# Patient Record
Sex: MALE | URBAN - METROPOLITAN AREA
[De-identification: names, ages, dates, MRNs, and addresses within clinical notes are randomized per-mention and may not be internally consistent; named-entity substitution may affect disease eponyms.]

---

## 2017-05-31 ENCOUNTER — APPOINTMENT (OUTPATIENT)
Age: 67
Setting detail: DERMATOLOGY
End: 2017-06-22

## 2017-05-31 DIAGNOSIS — L82.1 OTHER SEBORRHEIC KERATOSIS: ICD-10-CM

## 2017-05-31 DIAGNOSIS — D18.0 HEMANGIOMA: ICD-10-CM

## 2017-05-31 DIAGNOSIS — D22 MELANOCYTIC NEVI: ICD-10-CM

## 2017-05-31 DIAGNOSIS — L81.4 OTHER MELANIN HYPERPIGMENTATION: ICD-10-CM

## 2017-05-31 DIAGNOSIS — F10.99 ALCOHOL USE, UNSPECIFIED WITH UNSPECIFIED ALCOHOL-INDUCED DISORDER: ICD-10-CM

## 2017-05-31 DIAGNOSIS — D36.1 BENIGN NEOPLASM OF PERIPHERAL NERVES AND AUTONOMIC NERVOUS SYSTEM: ICD-10-CM

## 2017-05-31 PROBLEM — D36.13 BENIGN NEOPLASM OF PERIPHERAL NERVES AND AUTONOMIC NERVOUS SYSTEM OF LOWER LIMB, INCLUDING HIP: Status: ACTIVE | Noted: 2017-05-31

## 2017-05-31 PROBLEM — D18.01 HEMANGIOMA OF SKIN AND SUBCUTANEOUS TISSUE: Status: ACTIVE | Noted: 2017-05-31

## 2017-05-31 PROBLEM — D22.5 MELANOCYTIC NEVI OF TRUNK: Status: ACTIVE | Noted: 2017-05-31

## 2017-05-31 PROCEDURE — OTHER COUNSELING: OTHER

## 2017-05-31 PROCEDURE — OTHER RECOMMENDATIONS: OTHER

## 2017-05-31 PROCEDURE — 99214 OFFICE O/P EST MOD 30 MIN: CPT

## 2017-05-31 PROCEDURE — OTHER MIPS QUALITY: OTHER

## 2017-05-31 ASSESSMENT — LOCATION DETAILED DESCRIPTION DERM
LOCATION DETAILED: LEFT SUPERIOR MEDIAL MIDBACK
LOCATION DETAILED: RIGHT MEDIAL UPPER BACK
LOCATION DETAILED: LEFT DORSAL 2ND TOE
LOCATION DETAILED: XIPHOID
LOCATION DETAILED: LEFT MID-UPPER BACK
LOCATION DETAILED: PERIUMBILICAL SKIN
LOCATION DETAILED: RIGHT MEDIAL SUPERIOR CHEST

## 2017-05-31 ASSESSMENT — LOCATION SIMPLE DESCRIPTION DERM
LOCATION SIMPLE: LEFT LOWER BACK
LOCATION SIMPLE: LEFT UPPER BACK
LOCATION SIMPLE: RIGHT UPPER BACK
LOCATION SIMPLE: ABDOMEN
LOCATION SIMPLE: CHEST
LOCATION SIMPLE: LEFT 2ND TOE

## 2017-05-31 ASSESSMENT — LOCATION ZONE DERM
LOCATION ZONE: TRUNK
LOCATION ZONE: TOE

## 2017-05-31 NOTE — PROCEDURE: MIPS QUALITY
Detail Level: Detailed
Quality 226: Preventive Care And Screening: Tobacco Use: Screening And Cessation Intervention: Patient screened for tobacco and is an ex-smoker
Quality 130: Documentation Of Current Medications In The Medical Record: Current Medications Documented
Quality 431: Preventive Care And Screening: Unhealthy Alcohol Use - Screening: Patient screened for unhealthy alcohol use using a single question and scores 2 or greater episodes per year and brief intervention occurred
Quality 110: Preventive Care And Screening: Influenza Immunization: Influenza Immunization previously received during influenza season

## 2017-05-31 NOTE — HPI: SKIN CHECK
What Is The Reason For Today's Visit?: Excessive sun exposure
Additional History: Patient is here for annual skin exam (was seen 2 years ago).  Patient denies any new or worrisome lesions or spots.  Patient lives in [place] half of the year. Enjoys many outdoor activities (golf and swimming).  States he wears sunscreen most of the time but does not wear a hat.

## 2017-05-31 NOTE — PROCEDURE: RECOMMENDATIONS
Recommendation Preamble: The following recommendations were made during the visit: patient states that he consumes 4-5 beers or wine when he plays golf or goes out to dinner with friends.  This averages about twice to three items a month.  Patient was unaware  of 4-5 drinks per sitting is excessive.  Discussed in details ways to achieve change in behavior, alternating a drink then a glass of water, not drinking or just reducing the number of drinks. Patient genuinely concerned and interested in making the change.
Detail Level: Zone

## 2017-08-01 ENCOUNTER — SURGERY (OUTPATIENT)
Age: 67
End: 2017-08-01

## 2017-08-01 ENCOUNTER — HOSPITAL ENCOUNTER (OUTPATIENT)
Facility: CLINIC | Age: 67
Discharge: HOME | End: 2017-08-01
Attending: COLON & RECTAL SURGERY | Admitting: COLON & RECTAL SURGERY
Payer: MEDICARE

## 2017-08-01 VITALS
RESPIRATION RATE: 28 BRPM | SYSTOLIC BLOOD PRESSURE: 158 MMHG | OXYGEN SATURATION: 94 % | HEIGHT: 71 IN | WEIGHT: 210 LBS | BODY MASS INDEX: 29.4 KG/M2 | DIASTOLIC BLOOD PRESSURE: 99 MMHG

## 2017-08-01 LAB — COLONOSCOPY: NORMAL

## 2017-08-01 PROCEDURE — 25000128 H RX IP 250 OP 636: Performed by: COLON & RECTAL SURGERY

## 2017-08-01 PROCEDURE — G0121 COLON CA SCRN NOT HI RSK IND: HCPCS | Performed by: COLON & RECTAL SURGERY

## 2017-08-01 PROCEDURE — G0500 MOD SEDAT ENDO SERVICE >5YRS: HCPCS | Performed by: COLON & RECTAL SURGERY

## 2017-08-01 PROCEDURE — 45378 DIAGNOSTIC COLONOSCOPY: CPT | Performed by: COLON & RECTAL SURGERY

## 2017-08-01 RX ORDER — ONDANSETRON 2 MG/ML
4 INJECTION INTRAMUSCULAR; INTRAVENOUS EVERY 6 HOURS PRN
Status: DISCONTINUED | OUTPATIENT
Start: 2017-08-01 | End: 2017-08-01 | Stop reason: HOSPADM

## 2017-08-01 RX ORDER — ONDANSETRON 2 MG/ML
4 INJECTION INTRAMUSCULAR; INTRAVENOUS
Status: DISCONTINUED | OUTPATIENT
Start: 2017-08-01 | End: 2017-08-01 | Stop reason: HOSPADM

## 2017-08-01 RX ORDER — NALOXONE HYDROCHLORIDE 0.4 MG/ML
.1-.4 INJECTION, SOLUTION INTRAMUSCULAR; INTRAVENOUS; SUBCUTANEOUS
Status: DISCONTINUED | OUTPATIENT
Start: 2017-08-01 | End: 2017-08-01 | Stop reason: HOSPADM

## 2017-08-01 RX ORDER — FLUMAZENIL 0.1 MG/ML
0.2 INJECTION, SOLUTION INTRAVENOUS
Status: DISCONTINUED | OUTPATIENT
Start: 2017-08-01 | End: 2017-08-01 | Stop reason: HOSPADM

## 2017-08-01 RX ORDER — ONDANSETRON 4 MG/1
4 TABLET, ORALLY DISINTEGRATING ORAL EVERY 6 HOURS PRN
Status: DISCONTINUED | OUTPATIENT
Start: 2017-08-01 | End: 2017-08-01 | Stop reason: HOSPADM

## 2017-08-01 RX ORDER — LIDOCAINE 40 MG/G
CREAM TOPICAL
Status: DISCONTINUED | OUTPATIENT
Start: 2017-08-01 | End: 2017-08-01 | Stop reason: HOSPADM

## 2017-08-01 RX ORDER — FENTANYL CITRATE 50 UG/ML
INJECTION, SOLUTION INTRAMUSCULAR; INTRAVENOUS PRN
Status: DISCONTINUED | OUTPATIENT
Start: 2017-08-01 | End: 2017-08-01 | Stop reason: HOSPADM

## 2017-08-01 RX ADMIN — FENTANYL CITRATE 100 MCG: 50 INJECTION, SOLUTION INTRAMUSCULAR; INTRAVENOUS at 14:10

## 2017-08-01 RX ADMIN — MIDAZOLAM HYDROCHLORIDE 2 MG: 1 INJECTION, SOLUTION INTRAMUSCULAR; INTRAVENOUS at 14:10

## 2017-08-01 NOTE — BRIEF OP NOTE
[facility] Brief Operative Note    Pre-operative diagnosis: SCREENING   Post-operative diagnosis: sigmoid diverticula, otherwise normal   Procedure: Procedure(s):  COLONOSCOPY - Wound Class: II-Clean Contaminated   Surgeon(s): Surgeon(s) and Role:     * Emil Plaza MD - Primary   Estimated blood loss: * No values recorded between 8/1/2017 12:00 AM and 8/1/2017  2:30 PM *    Specimens: * No specimens in log *   Findings: sigmoid diverticula, otherwise normal  See provation procedure note in chart review.    Emil Plaza MD  [facility]  [phone]

## 2017-08-01 NOTE — H&P
[facility]    History and Physical  Colon and Rectal Surgery     Date of Admission:  8/1/2017      Assessment & Plan   Dominguez Pittman is a 67 year old male who presents for colonoscopy.    Indication: family history of polyps/ screening  Plan for Colonoscopy with possible biopsy, possible polypectomy. We discussed the risks, benefits and alternatives and the patient wished to proceed.    The above has been forwarded to the consulting provider.      Emil Plaza MD  [facility]  [phone]        Code Status   Full Code    Primary Care Physician   Jad Truong      History is obtained from the patient    History of Present Illness   Dominguez Pittman is a 67 year old male who presents with family history of polyps/ screening    Past Medical History    I have reviewed this patient's medical history and updated it with pertinent information if needed.   Past Medical History:   Diagnosis Date     Abnormal blood find NEC      Arthritis      High cholesterol      Hx musculoskletl dis NEC      Hypertension      Personal history of other disorders of nervous system and sense organs      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        Past Surgical History   I have reviewed this patient's surgical history and updated it with pertinent information if needed.  Past Surgical History:   Procedure Laterality Date     ARTHRODESIS WRIST Left 3/18/2016    Procedure: ARTHRODESIS WRIST;  Surgeon: Mayda Teresa MD;  Location: [facility]     ARTHRODESIS WRIST Left 12/21/2016    Procedure: ARTHRODESIS WRIST;  Surgeon: Mayda Teresa MD;  Location: [facility]     C NONSPECIFIC PROCEDURE      diskectomy L5s     COLONOSCOPY  7/27/2012    Procedure: COLONOSCOPY;  COLONOSCOPY;  Surgeon: Kris Garcia MD;  Location:  GI     ENDOSCOPIC RELEASE CARPAL TUNNEL Left 3/18/2016    Procedure: ENDOSCOPIC RELEASE CARPAL TUNNEL;   Surgeon: Mayda Teresa MD;  Location: [facility]     JOINT REPLACEMTN, KNEE RT/LT      Joint Replacement knee LT, right hip replacment      REMOVE HARDWARE WRIST Left 2016    Procedure: REMOVE HARDWARE WRIST;  Surgeon: Mayda Teresa MD;  Location: [facility]     SURGICAL HISTORY OF -       arthroscopyx 3     SURGICAL HISTORY OF -       lt cholesteotoma       Prior to Admission Medications   Prior to Admission Medications   Prescriptions Last Dose Informant Patient Reported? Taking?   ADVIL 200 MG PO TABS 2017  Yes No   Sig: prn    ASPIRIN PO 2017  Yes No   Sig: Take 81 mg by mouth daily   CRESTOR 10 MG tablet 2017  No Yes   Sig: TAKE 1 TABLET (10 MG) BY MOUTH DAILY   GLUCOSAMINE SULFATE PO 2017  Yes No   Sig: Take 1,500 mg by mouth daily.     METOPROLOL SUCCINATE ER PO 2017  Yes Yes   Sig: Take 100 mg by mouth daily   MULTI-VITAMIN OR TABS 2017  No No   Si q day    TYLENOL 325 MG PO TABS   Yes No   Sig: prn    fish oil-omega-3 fatty acids 1000 MG capsule 2017  Yes No   Sig: Take 2 capsules by mouth daily.   hydrOXYzine (ATARAX) 25 MG tablet   No No   Sig: Take 1 25-mg tablet every 6 hours as needed for muscle relaxation and to supplement your pain medication.   oxyCODONE (OXYCONTIN) 10 MG 12 hr tablet   No No   Sig: Take 2 tablets the evening after surgery. Take 2 tablets in the morning and the evening for 2 days. Take 1 tablet in the morning and evening for 2 days. Take 1 tablet in the morning for 2 days.   oxyCODONE-acetaminophen (PERCOCET) 5-325 MG per tablet   No No   Sig: Take 1-2 tablets every 4-6 hours for pain if needed.   tadalafil (CIALIS) 20 MG tablet   No No   Sig: Take 1 tablet by mouth daily as needed for erectile dysfunction.      Facility-Administered Medications: None     Allergies   Allergies   Allergen Reactions     Penicillins Hives       Social History   I have reviewed this patient's social history and updated it with  pertinent information if needed. Dominguez Pittman  reports that he quit smoking about 44 years ago. His smoking use included Cigarettes. He has a 1.00 pack-year smoking history. He has never used smokeless tobacco. He reports that he drinks about 6.0 oz of alcohol per week  He reports that he does not use illicit drugs.    Family History   I have reviewed this patient's family history and updated it with pertinent information if needed.   Family History   Problem Relation Age of Onset     C.A.D. Paternal Grandfather      MI       Review of Systems   C: NEGATIVE for fever, chills, change in weight  E/M: NEGATIVE for ear, mouth and throat problems  R: NEGATIVE for significant cough or SOB  CV: NEGATIVE for chest pain, palpitations or peripheral edema    Physical Exam             Resp: 16 SpO2: 97 % O2 Device: None (Room air)    Vital Signs with Ranges  Resp:  [16] 16  SpO2:  [97 %] 97 %  210 lbs 0 oz    Constitutional: awake, alert, cooperative, no apparent distress, and appears stated age  AIRWAY EXAM: Mallampatti Class II (visualization of the soft palate, fauces, and uvula)  Respiratory: No increased work of breathing, good air exchange, clear to auscultation bilaterally, no crackles or wheezing  Cardiovascular: Normal apical impulse, regular rate and rhythm, normal S1 and S2, no S3 or S4, and no murmur noted  ASA Class: 2 - Mild systemic disease

## 2017-08-23 ENCOUNTER — OFFICE VISIT (OUTPATIENT)
Dept: FAMILY MEDICINE | Facility: CLINIC | Age: 67
End: 2017-08-23
Payer: COMMERCIAL

## 2017-08-23 VITALS
RESPIRATION RATE: 14 BRPM | OXYGEN SATURATION: 96 % | BODY MASS INDEX: 30.1 KG/M2 | DIASTOLIC BLOOD PRESSURE: 85 MMHG | HEIGHT: 71 IN | SYSTOLIC BLOOD PRESSURE: 156 MMHG | HEART RATE: 69 BPM | WEIGHT: 215 LBS | TEMPERATURE: 97.9 F

## 2017-08-23 DIAGNOSIS — I86.1 VARICOCELE: ICD-10-CM

## 2017-08-23 DIAGNOSIS — N50.82 SCROTAL PAIN: Primary | ICD-10-CM

## 2017-08-23 PROCEDURE — 99202 OFFICE O/P NEW SF 15 MIN: CPT | Performed by: FAMILY MEDICINE

## 2017-08-23 NOTE — MR AVS SNAPSHOT
"              After Visit Summary   8/23/2017    Dominguez Pittman    MRN: [id number]           Patient Information     Date Of Birth          1950        Visit Information        Provider Department      8/23/2017 10:20 AM Brent Dietz MD [facility] [place]        Today's Diagnoses     Scrotal pain    -  1    Varicocele           Follow-ups after your visit        Future tests that were ordered for you today     Open Future Orders        Priority Expected Expires Ordered    US Testicular & Scrotum w Doppler Ltd Routine  8/23/2018 8/23/2017            Who to contact     If you have questions or need follow up information about today's clinic visit or your schedule please [facility] at [phone].  Normal or non-critical lab and imaging results will be communicated to you by MyChart, letter or phone within 4 business days after the clinic has received the results. If you do not hear from us within 7 days, please contact the clinic through Whoteverhart or phone. If you have a critical or abnormal lab result, we will notify you by phone as soon as possible.  Submit refill requests through Phybridge or call your pharmacy and they will forward the refill request to us. Please allow 3 business days for your refill to be completed.          Additional Information About Your Visit        Whotever[employer] Information     Phybridge lets you send messages to your doctor, view your test results, renew your prescriptions, schedule appointments and more. To sign up, go to [email or web address] . Click on \"Log in\" on the left side of the screen, which will take you to the Welcome page. Then click on \"Sign up Now\" on the right side of the page.     You will be asked to enter the access code listed below, as well as some personal information. Please follow the directions to create your username and password.     Your access code is: [email or web address]  Expires: 11/21/2017 10:50 AM     Your access " "code will  in 90 days. If you need help or a new code, please call your [facility] clinic or [phone].        Care EveryWhere ID     This is your Care EveryWhere ID. This could be used by other organizations to access your [facility] medical records  [id number]        Your Vitals Were     Pulse Temperature Respirations Height Pulse Oximetry BMI (Body Mass Index)    69 97.9  F (36.6  C) (Tympanic) 14 5' 11\" (1.803 m) 96% 29.99 kg/m2       Blood Pressure from Last 3 Encounters:   17 156/85   17 (!) 158/99   16 121/80    Weight from Last 3 Encounters:   17 215 lb (97.5 kg)   17 210 lb (95.3 kg)   16 198 lb 12.8 oz (90.2 kg)               Primary Care Provider Office Phone # Fax #    Jad Truong [phone]       [facility] [address]  [place] [address]        Equal Access to Services     [facility]: Hadii aad ku hadasho Soomaali, waaxda luqadaha, qaybta kaalmada adepb, marge canales . So [facility] [phone].    ATENCIÓN: Si habla español, tiene a torres disposición servicios gratuitos de asistencia lingüística. Bora al [phone].    We comply with applicable federal civil rights laws and [place] laws. We do not discriminate on the basis of race, color, national origin, age, disability sex, sexual orientation or gender identity.            Thank you!     Thank you for choosing [facility]  for your care. Our goal is always to provide you with excellent care. Hearing back from our patients is one way we can continue to improve our services. Please take a few minutes to complete the written survey that you may receive in the mail after your visit with us. Thank you!             Your Updated Medication List - Protect others around you: Learn how to safely use, store and throw away your medicines at www.disposemymeds.org.          This list is accurate as of: 17 10:50 AM.  Always use " your most recent med list.                   Brand Name Dispense Instructions for use Diagnosis    ADVIL 200 MG tablet   Generic drug:  ibuprofen      prn    Arthritis       ASPIRIN PO      Take 81 mg by mouth daily        CRESTOR 10 MG tablet   Generic drug:  rosuvastatin     30 tablet    TAKE 1 TABLET (10 MG) BY MOUTH DAILY    Hyperlipidemia LDL goal <130       fish oil-omega-3 fatty acids 1000 MG capsule     180 capsule    Take 2 capsules by mouth daily.        GLUCOSAMINE SULFATE PO      Take 1,500 mg by mouth daily.        METOPROLOL SUCCINATE ER PO      Take 100 mg by mouth daily        Multi-vitamin Tabs tablet   Generic drug:  multivitamin, therapeutic with minerals      1 q day    Routine general medical examination at a health care facility       tadalafil 20 MG tablet    CIALIS    3 tablet    Take 1 tablet by mouth daily as needed for erectile dysfunction.    Erectile dysfunction       TYLENOL 325 MG tablet   Generic drug:  acetaminophen      prn    Arthritis       VITAMIN D (CHOLECALCIFEROL) PO      Take by mouth daily

## 2017-08-23 NOTE — NURSING NOTE
"Chief Complaint   Patient presents with     Groin Pain       Initial /85 (Cuff Size: Adult Large)  Pulse 69  Temp 97.9  F (36.6  C) (Tympanic)  Resp 14  Ht 5' 11\" (1.803 m)  Wt 215 lb (97.5 kg)  SpO2 96%  BMI 29.99 kg/m2 Estimated body mass index is 29.99 kg/(m^2) as calculated from the following:    Height as of this encounter: 5' 11\" (1.803 m).    Weight as of this encounter: 215 lb (97.5 kg).  Medication Reconciliation: complete   Nora Martin, CMA    "

## 2017-08-23 NOTE — PROGRESS NOTES
SUBJECTIVE:   Dominguez Pittman is a 67 year old male who presents to clinic today for the following health issues:      Groin Pain       Duration: 1 weeks     Description (location/character/radiation): pain in groin area after moving a refrigeration     Intensity:  mild    Accompanying signs and symptoms: none     History (similar episodes/previous evaluation): None    Precipitating or alleviating factors: None    Therapies tried and outcome: Advil      Problem list and histories reviewed & adjusted, as indicated.  Additional history: as documented    Patient Active Problem List   Diagnosis     Arthritis     Varicose veins of legs     HYPERLIPIDEMIA LDL GOAL <130     Advance care planning     Erectile dysfunction     Ganglion cyst     Throat clearing     Laceration     Head injury     Open wound of scalp     Left inguinal hernia     Plantar warts     PAC (premature atrial contraction)     Past Surgical History:   Procedure Laterality Date     ARTHRODESIS WRIST Left 3/18/2016    Procedure: ARTHRODESIS WRIST;  Surgeon: Mayda Teresa MD;  Location: [facility]     ARTHRODESIS WRIST Left 12/21/2016    Procedure: ARTHRODESIS WRIST;  Surgeon: Mayda Teresa MD;  Location: [facility]     C NONSPECIFIC PROCEDURE      diskectomy L5s     COLONOSCOPY  7/27/2012    Procedure: COLONOSCOPY;  COLONOSCOPY;  Surgeon: Kris Garcia MD;  Location:  GI     COLONOSCOPY N/A 8/1/2017    Procedure: COLONOSCOPY;  COLONOSCOPY;  Surgeon: Emil Plaza MD;  Location:  GI     ENDOSCOPIC RELEASE CARPAL TUNNEL Left 3/18/2016    Procedure: ENDOSCOPIC RELEASE CARPAL TUNNEL;  Surgeon: Mayda Teresa MD;  Location: [facility]     JOINT REPLACEMTN, KNEE RT/LT  2007    Joint Replacement knee LT, right hip replacment      REMOVE HARDWARE WRIST Left 12/21/2016    Procedure: REMOVE HARDWARE WRIST;  Surgeon: Mayda Teresa MD;  Location: [facility]     SURGICAL HISTORY OF -       arthroscopyx 3      SURGICAL HISTORY OF -       lt cholesteotoma       Social History   Substance Use Topics     Smoking status: Former Smoker     Packs/day: 0.50     Years: 2.00     Types: Cigarettes     Quit date: 2/4/1973     Smokeless tobacco: Never Used     Alcohol use 6.0 oz/week      Comment: 3times/week     Family History   Problem Relation Age of Onset     C.A.D. Paternal Grandfather      MI         Current Outpatient Prescriptions   Medication Sig Dispense Refill     VITAMIN D, CHOLECALCIFEROL, PO Take by mouth daily       METOPROLOL SUCCINATE ER PO Take 100 mg by mouth daily       ASPIRIN PO Take 81 mg by mouth daily       CRESTOR 10 MG tablet TAKE 1 TABLET (10 MG) BY MOUTH DAILY 30 tablet 0     GLUCOSAMINE SULFATE PO Take 1,500 mg by mouth daily.         fish oil-omega-3 fatty acids 1000 MG capsule Take 2 capsules by mouth daily. 180 capsule 12     tadalafil (CIALIS) 20 MG tablet Take 1 tablet by mouth daily as needed for erectile dysfunction. 3 tablet 11     TYLENOL 325 MG PO TABS prn        ADVIL 200 MG PO TABS prn        MULTI-VITAMIN OR TABS 1 q day   0     Allergies   Allergen Reactions     Penicillins Hives     Recent Labs   Lab Test  05/01/14   0952  07/18/13   0953  07/24/12   0814   LDL  107  106  125   HDL  42  42  39*   TRIG  174*  146  94   ALT  25  31  24   CR  0.90  0.82  0.84   GFRESTIMATED  86  >90  >90   GFRESTBLACK  >90  >90  >90   POTASSIUM  4.4  5.1  5.2      BP Readings from Last 3 Encounters:   08/23/17 156/85   08/01/17 (!) 158/99   12/21/16 121/80    Wt Readings from Last 3 Encounters:   08/23/17 215 lb (97.5 kg)   08/01/17 210 lb (95.3 kg)   12/21/16 198 lb 12.8 oz (90.2 kg)           Reviewed and updated as needed this visit by clinical staff     Reviewed and updated as needed this visit by Provider         ROS:  C: NEGATIVE for fever, chills, change in weight  E/M: NEGATIVE for ear, mouth and throat problems  R: NEGATIVE for significant cough or SOB  CV: NEGATIVE for chest pain, palpitations  "or peripheral edema  : testicular mass and pain    OBJECTIVE:     /85 (Cuff Size: Adult Large)  Pulse 69  Temp 97.9  F (36.6  C) (Tympanic)  Resp 14  Ht 5' 11\" (1.803 m)  Wt 215 lb (97.5 kg)  SpO2 96%  BMI 29.99 kg/m2  Body mass index is 29.99 kg/(m^2).  GENERAL: healthy, alert and no distress  NECK: no adenopathy, no asymmetry, masses, or scars and thyroid normal to palpation  RESP: lungs clear to auscultation - no rales, rhonchi or wheezes  CV: regular rate and rhythm, normal S1 S2, no S3 or S4, no murmur, click or rub, no peripheral edema and peripheral pulses strong  ABDOMEN: soft, nontender, no hepatosplenomegaly, no masses and bowel sounds normal   (male): varicocele present left, hernia on left side  and penis normal without urethral discharge  MS: no gross musculoskeletal defects noted, no edema        ASSESSMENT/PLAN:   Dominguez was seen today for groin pain.    Diagnoses and all orders for this visit:    Scrotal pain  -     US Testicular & Scrotum w Doppler Ltd; Future    Varicocele  -     US Testicular & Scrotum w Doppler Ltd; Future      Has started after after heavy lifting, will review US and update pt with follow-up plan         Brent Dietz MD  [facility]  "

## 2017-08-28 ENCOUNTER — TELEPHONE (OUTPATIENT)
Dept: FAMILY MEDICINE | Facility: CLINIC | Age: 67
End: 2017-08-28

## 2017-08-28 ENCOUNTER — TRANSFERRED RECORDS (OUTPATIENT)
Dept: HEALTH INFORMATION MANAGEMENT | Facility: CLINIC | Age: 67
End: 2017-08-28

## 2017-08-28 NOTE — TELEPHONE ENCOUNTER
US showed he has varicocele as was noted during his visit. Otherwise, there is no abnormal finding including mass nor worsening inguinal hernia.   He inguinal pain seemed coming from temporarily increased blood circulation during the heavy lifting, which will be improving with observation.   Please let him know  thx

## 2017-08-28 NOTE — TELEPHONE ENCOUNTER
See telephone encounter for patient.  Chelsea Albrecht RN - Triage  [facility]

## 2017-08-28 NOTE — TELEPHONE ENCOUNTER
Patient notified with information noted below from provider and agrees with plan.  Chelsea Albrecht RN - Triage  [facility]

## 2017-08-28 NOTE — TELEPHONE ENCOUNTER
Reason for Call:  Other other    Detailed comments: Pt return call to Dr Dietz nurse , he stated it is re some imaging test results.    Phone Number Patient can be reached at: Home number on file [phone] (home)    Best Time: anytime    Can we leave a detailed message on this number? YES    Call taken on 8/28/2017 at 5:31 PM by Laurita Beltran

## 2017-08-28 NOTE — TELEPHONE ENCOUNTER
Results received from [facility] and given to provider to review.  Scrotum & testicular US  Chloe Welch TC

## 2017-10-06 ENCOUNTER — TELEPHONE (OUTPATIENT)
Dept: FAMILY MEDICINE | Facility: CLINIC | Age: 67
End: 2017-10-06

## 2017-10-06 ENCOUNTER — OFFICE VISIT (OUTPATIENT)
Dept: FAMILY MEDICINE | Facility: CLINIC | Age: 67
End: 2017-10-06
Payer: COMMERCIAL

## 2017-10-06 VITALS
RESPIRATION RATE: 16 BRPM | HEART RATE: 72 BPM | BODY MASS INDEX: 29.82 KG/M2 | HEIGHT: 71 IN | WEIGHT: 213 LBS | OXYGEN SATURATION: 98 % | DIASTOLIC BLOOD PRESSURE: 84 MMHG | TEMPERATURE: 97.9 F | SYSTOLIC BLOOD PRESSURE: 133 MMHG

## 2017-10-06 DIAGNOSIS — Z01.818 PREOP GENERAL PHYSICAL EXAM: ICD-10-CM

## 2017-10-06 DIAGNOSIS — Z23 NEED FOR PROPHYLACTIC VACCINATION AGAINST STREPTOCOCCUS PNEUMONIAE (PNEUMOCOCCUS): ICD-10-CM

## 2017-10-06 DIAGNOSIS — Z23 NEED FOR PROPHYLACTIC VACCINATION AND INOCULATION AGAINST INFLUENZA: ICD-10-CM

## 2017-10-06 DIAGNOSIS — Z91.81 AT RISK FOR FALLING: ICD-10-CM

## 2017-10-06 DIAGNOSIS — Z11.59 NEED FOR HEPATITIS C SCREENING TEST: ICD-10-CM

## 2017-10-06 PROCEDURE — G0008 ADMIN INFLUENZA VIRUS VAC: HCPCS | Performed by: FAMILY MEDICINE

## 2017-10-06 PROCEDURE — 90662 IIV NO PRSV INCREASED AG IM: CPT | Performed by: FAMILY MEDICINE

## 2017-10-06 PROCEDURE — 99214 OFFICE O/P EST MOD 30 MIN: CPT | Mod: 25 | Performed by: FAMILY MEDICINE

## 2017-10-06 RX ORDER — PREDNISOLONE ACETATE 10 MG/ML
SUSPENSION/ DROPS OPHTHALMIC
COMMUNITY
Start: 2017-10-04 | End: 2019-10-25

## 2017-10-06 RX ORDER — KETOROLAC TROMETHAMINE 5 MG/ML
SOLUTION OPHTHALMIC
COMMUNITY
Start: 2017-10-04 | End: 2019-10-25

## 2017-10-06 RX ORDER — MOXIFLOXACIN 5 MG/ML
SOLUTION/ DROPS OPHTHALMIC
COMMUNITY
Start: 2017-10-04 | End: 2019-10-25

## 2017-10-06 NOTE — MR AVS SNAPSHOT
After Visit Summary   10/6/2017    Dominguez Pittman    MRN: [id number]           Patient Information     Date Of Birth          1950        Visit Information        Provider Department      10/6/2017 12:40 PM Brent Dietz MD [facility] [place]        Today's Diagnoses     Need for hepatitis C screening test        At risk for falling        Need for prophylactic vaccination and inoculation against influenza        Need for prophylactic vaccination against Streptococcus pneumoniae (pneumococcus)        Preop general physical exam          Care Instructions      Before Your Surgery      Call your surgeon if there is any change in your health. This includes signs of a cold or flu (such as a sore throat, runny nose, cough, rash or fever).    Do not smoke, drink alcohol or take over the counter medicine (unless your surgeon or primary care doctor tells you to) for the 24 hours before and after surgery.    If you take prescribed drugs: Follow your doctor s orders about which medicines to take and which to stop until after surgery.    Eating and drinking prior to surgery: follow the instructions from your surgeon    Take a shower or bath the night before surgery. Use the soap your surgeon gave you to gently clean your skin. If you do not have soap from your surgeon, use your regular soap. Do not shave or scrub the surgery site.  Wear clean pajamas and have clean sheets on your bed.           Follow-ups after your visit        Who to contact     If you have questions or need follow up information about today's clinic visit or your schedule please contact [facility] [place] directly at [phone].  Normal or non-critical lab and imaging results will be communicated to you by MyChart, letter or phone within 4 business days after the clinic has received the results. If you do not hear from us within 7 days, please contact the clinic through MyChart or phone. If you have a  "critical or abnormal lab result, we will notify you by phone as soon as possible.  Submit refill requests through [employer] or call your pharmacy and they will forward the refill request to us. Please allow 3 business days for your refill to be completed.          Additional Information About Your Visit        [employer] Information     [employer] lets you send messages to your doctor, view your test results, renew your prescriptions, schedule appointments and more. To sign up, go to [email or web address] . Click on \"Log in\" on the left side of the screen, which will take you to the Welcome page. Then click on \"Sign up Now\" on the right side of the page.     You will be asked to enter the access code listed below, as well as some personal information. Please follow the directions to create your username and password.     Your access code is: [email or web address]  Expires: 2017 10:50 AM     Your access code will  in 90 days. If you need help or a new code, please call your [facility] clinic or [phone].        Care EveryWhere ID     This is your Care EveryWhere ID. This could be used by other organizations to access your [facility] medical records  [id number]        Your Vitals Were     Pulse Temperature Respirations Height Pulse Oximetry BMI (Body Mass Index)    72 97.9  F (36.6  C) (Tympanic) 16 5' 11\" (1.803 m) 98% 29.71 kg/m2       Blood Pressure from Last 3 Encounters:   10/06/17 133/84   17 156/85   17 (!) 158/99    Weight from Last 3 Encounters:   10/06/17 213 lb (96.6 kg)   17 215 lb (97.5 kg)   17 210 lb (95.3 kg)              We Performed the Following     FLU VACCINE, INCREASED ANTIGEN, PRESV FREE, AGE 65+ [95921]     Vaccine Administration, Initial [13060]          Today's Medication Changes          These changes are accurate as of: 10/6/17  2:04 PM.  If you have any questions, ask your nurse or doctor.               Stop taking these medicines if you haven't already. Please " contact your care team if you have questions.     METOPROLOL SUCCINATE ER PO   Stopped by:  Brent Dietz MD           tadalafil 20 MG tablet   Commonly known as:  CIALIS   Stopped by:  Brent Dietz MD                    Primary Care Provider Office Phone # Fax #    Brent Dietz [phone]       [address] [facility] [address]        Equal Access to Services     [facility] Hospital[facility] : Hadii aad ku hadasho Soomaali, waaxda luqadaha, qaybta kaalmada adeegyada, waxay idiin hayaan adeeg kharash la'aan ah. So [facility] [phone].    ATENCIÓN: Si habla español, tiene a torres disposición servicios gratuitos de asistencia lingüística. Llame al [phone].    We comply with applicable federal civil rights laws and [place] laws. We do not discriminate on the basis of race, color, national origin, age, disability, sex, sexual orientation, or gender identity.            Thank you!     Thank you for choosing [facility]  for your care. Our goal is always to provide you with excellent care. Hearing back from our patients is one way we can continue to improve our services. Please take a few minutes to complete the written survey that you may receive in the mail after your visit with us. Thank you!             Your Updated Medication List - Protect others around you: Learn how to safely use, store and throw away your medicines at www.disposemymeds.org.          This list is accurate as of: 10/6/17  2:04 PM.  Always use your most recent med list.                   Brand Name Dispense Instructions for use Diagnosis    ADVIL 200 MG tablet   Generic drug:  ibuprofen      prn    Arthritis       ASPIRIN PO      Take 81 mg by mouth daily        CRESTOR 10 MG tablet   Generic drug:  rosuvastatin     30 tablet    TAKE 1 TABLET (10 MG) BY MOUTH DAILY    Hyperlipidemia LDL goal <130       fish oil-omega-3 fatty acids 1000 MG capsule     180 capsule    Take 2 capsules by mouth daily.        GLUCOSAMINE  SULFATE PO      Take 1,500 mg by mouth daily.        ketorolac 0.5 % ophthalmic solution    ACULAR          moxifloxacin 0.5 % ophthalmic solution    VIGAMOX          Multi-vitamin Tabs tablet   Generic drug:  multivitamin, therapeutic with minerals      1 q day    Routine general medical examination at a health care facility       prednisoLONE acetate 1 % ophthalmic susp    PRED FORTE          TYLENOL 325 MG tablet   Generic drug:  acetaminophen      prn    Arthritis       VITAMIN D (CHOLECALCIFEROL) PO      Take by mouth daily

## 2017-10-06 NOTE — NURSING NOTE
"Chief Complaint   Patient presents with     Pre-Op Exam       Initial /84 (Cuff Size: Adult Large)  Pulse 72  Temp 97.9  F (36.6  C) (Tympanic)  Resp 16  Ht 5' 11\" (1.803 m)  Wt 213 lb (96.6 kg)  SpO2 98%  BMI 29.71 kg/m2 Estimated body mass index is 29.71 kg/(m^2) as calculated from the following:    Height as of this encounter: 5' 11\" (1.803 m).    Weight as of this encounter: 213 lb (96.6 kg).  Medication Reconciliation: complete   Nora Martin, CMA    "

## 2017-10-06 NOTE — PROGRESS NOTES
Injectable Influenza Immunization Documentation    1.  Is the person to be vaccinated sick today?   No    2. Does the person to be vaccinated have an allergy to a component   of the vaccine?   No    3. Has the person to be vaccinated ever had a serious reaction   to influenza vaccine in the past?   No    4. Has the person to be vaccinated ever had Guillain-Barré syndrome?   No    Form completed by Nora Martin, [facility]

## 2017-10-06 NOTE — PROGRESS NOTES
[facility]  [address] [address]  [phone]  Dept: [phone]    PRE-OP EVALUATION:  Today's date: 10/6/2017    Dominguez Pittman (: 1950) presents for pre-operative evaluation assessment as requested by Dr. Gordon Burnett.  He requires evaluation and anesthesia risk assessment prior to undergoing surgery/procedure.  Proposed procedure: Cataract Surgery.    Date of Surgery/ Procedure: 10/11/17  Time of Surgery/ Procedure: 2:00 PM  Hospital/Surgical Facility: [facility]   Fax number: [phone]   Primary Physician: Brent Dietz  Type of Anesthesia Anticipated: to be determined    Patient has a Health Care Directive or Living Will:  YES     1. NO - Do you have a history of heart attack, stroke, stent, bypass or surgery on an artery in the head, neck, heart or legs?  2. NO - Do you ever have any pain or discomfort in your chest?  3. NO - Do you have a history of  Heart Failure?  4. NO - Are you troubled by shortness of breath when: walking on the level, up a slight hill or at night?  5. NO - Do you currently have a cold, bronchitis or other respiratory infection?  6. NO - Do you have a cough, shortness of breath or wheezing?  7. NO - Do you sometimes get pains in the calves of your legs when you walk?  8. NO - Do you or anyone in your family have previous history of blood clots?  9. NO - Do you or does anyone in your family have a serious bleeding problem such as prolonged bleeding following surgeries or cuts?  10. NO - Have you ever had problems with anemia or been told to take iron pills?  11. NO - Have you had any abnormal blood loss such as black, tarry or bloody stools, or abnormal vaginal bleeding?  12. NO - Have you ever had a blood transfusion?  13. NO - Have you or any of your relatives ever had problems with anesthesia?  14. NO - Do you have sleep apnea, excessive snoring or daytime drowsiness?  15. NO - Do you have any  prosthetic heart valves?  16. YES - Do you have prosthetic joints?  17. NO - Is there any chance that you may be pregnant?        HPI:                                                        See problem list for active medical problems.  Problems all longstanding and stable, except as noted/documented.  See ROS for pertinent symptoms related to these conditions.                                                                                                  .    MEDICAL HISTORY:                                                    Patient Active Problem List    Diagnosis Date Noted     PAC (premature atrial contraction) 05/01/2014     Priority: Medium     Left inguinal hernia 07/18/2013     Priority: Medium     Plantar warts 07/18/2013     Priority: Medium     Open wound of scalp 01/09/2013     Priority: Medium     Problem list name updated by automated process. Provider to review       Laceration 11/19/2012     Priority: Medium     Head injury 11/19/2012     Priority: Medium     Ganglion cyst 07/24/2012     Priority: Medium     Throat clearing 07/24/2012     Priority: Medium     Advance care planning 07/19/2011     Priority: Medium     Advance Care Planning 6/17/2016: Receipt of ACP document:  Received: Health Care Directive which was witnessed or notarized on 1/27/16.  Document previously scanned on 3/24/16.  Validation form completed and sent to be scanned.  Code Status needs to be updated to reflect choices in most recent ACP document. Confirmed/documented designated decision maker(s).  Added by Ines De León   Advance Care Planning Liaison  Advance Care Planning 7/19/11: Patient will bring in Copy       Erectile dysfunction 07/19/2011     Priority: Medium     HYPERLIPIDEMIA LDL GOAL <130 10/31/2010     Priority: Medium     Varicose veins of legs 06/28/2010     Priority: Medium     Arthritis 07/14/2008     Priority: Medium      Past Medical History:   Diagnosis Date     Abnormal blood find NEC      Arthritis       High cholesterol      Hx musculoskletl dis NEC      Hypertension      Personal history of other disorders of nervous system and sense organs      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      Past Surgical History:   Procedure Laterality Date     ARTHRODESIS WRIST Left 3/18/2016    Procedure: ARTHRODESIS WRIST;  Surgeon: Mayda Teresa MD;  Location: [facility]     ARTHRODESIS WRIST Left 12/21/2016    Procedure: ARTHRODESIS WRIST;  Surgeon: Mayda Teresa MD;  Location: [facility]     C NONSPECIFIC PROCEDURE      diskectomy L5s     COLONOSCOPY  7/27/2012    Procedure: COLONOSCOPY;  COLONOSCOPY;  Surgeon: Kris Garcia MD;  Location:  GI     COLONOSCOPY N/A 8/1/2017    Procedure: COLONOSCOPY;  COLONOSCOPY;  Surgeon: Emil Plaza MD;  Location:  GI     ENDOSCOPIC RELEASE CARPAL TUNNEL Left 3/18/2016    Procedure: ENDOSCOPIC RELEASE CARPAL TUNNEL;  Surgeon: Mayda Teresa MD;  Location: [facility]     JOINT REPLACEMTN, KNEE RT/LT  2007    Joint Replacement knee LT, right hip replacment      REMOVE HARDWARE WRIST Left 12/21/2016    Procedure: REMOVE HARDWARE WRIST;  Surgeon: Mayda Teresa MD;  Location: [facility]     SURGICAL HISTORY OF -       arthroscopyx 3     SURGICAL HISTORY OF -       lt cholesteotoma     Current Outpatient Prescriptions   Medication Sig Dispense Refill     VITAMIN D, CHOLECALCIFEROL, PO Take by mouth daily       METOPROLOL SUCCINATE ER PO Take 100 mg by mouth daily       ASPIRIN PO Take 81 mg by mouth daily       CRESTOR 10 MG tablet TAKE 1 TABLET (10 MG) BY MOUTH DAILY 30 tablet 0     GLUCOSAMINE SULFATE PO Take 1,500 mg by mouth daily.         fish oil-omega-3 fatty acids 1000 MG capsule Take 2 capsules by mouth daily. 180 capsule 12     tadalafil (CIALIS) 20 MG tablet Take 1 tablet by mouth daily as needed for erectile dysfunction. 3 tablet 11     TYLENOL 325 MG PO TABS prn        ADVIL 200 MG PO TABS  "prn        MULTI-VITAMIN OR TABS 1 q day   0     OTC products: None, except as noted above    Allergies   Allergen Reactions     Penicillins Hives      Latex Allergy: NO    Social History   Substance Use Topics     Smoking status: Former Smoker     Packs/day: 0.50     Years: 2.00     Types: Cigarettes     Quit date: 2/4/1973     Smokeless tobacco: Never Used     Alcohol use 6.0 oz/week      Comment: 3times/week     History   Drug Use No       REVIEW OF SYSTEMS:                                                    C: NEGATIVE for fever, chills, change in weight  E/M: NEGATIVE for ear, mouth and throat problems  R: NEGATIVE for significant cough or SOB  CV: NEGATIVE for chest pain, palpitations or peripheral edema    EXAM:                                                    /84 (Cuff Size: Adult Large)  Pulse 72  Temp 97.9  F (36.6  C) (Tympanic)  Resp 16  Ht 5' 11\" (1.803 m)  Wt 213 lb (96.6 kg)  SpO2 98%  BMI 29.71 kg/m2  GENERAL APPEARANCE: healthy, alert and no distress  HENT: ear canals and TM's normal and nose and mouth without ulcers or lesions  RESP: lungs clear to auscultation - no rales, rhonchi or wheezes  CV: regular rate and rhythm, normal S1 S2, no S3 or S4 and no murmur, click or rub   ABDOMEN: soft, nontender, no HSM or masses and bowel sounds normal  NEURO: Normal strength and tone, sensory exam grossly normal, mentation intact and speech normal    DIAGNOSTICS:                                                    No labs or EKG required for low risk surgery (cataract, skin procedure, breast biopsy, etc)    Recent Labs   Lab Test  05/01/14   0952  07/18/13   0953   04/21/11   1042   HGB   --    --    --   13.1*   PLT   --    --    --   260   NA  144  140   < >   --    POTASSIUM  4.4  5.1   < >   --    CR  0.90  0.82   < >   --     < > = values in this interval not displayed.        IMPRESSION:                                                    Reason for surgery/procedure: Cataracts right side "     The proposed surgical procedure is considered LOW risk.    REVISED CARDIAC RISK INDEX  The patient has the following serious cardiovascular risks for perioperative complications such as (MI, PE, VFib and 3  AV Block):  No serious cardiac risks  INTERPRETATION: 0 risks: Class I (very low risk - 0.4% complication rate)    The patient has the following additional risks for perioperative complications:  No identified additional risks      ICD-10-CM    1. Need for hepatitis C screening test Z11.59    2. At risk for falling Z91.81    3. Need for prophylactic vaccination and inoculation against influenza Z23    4. Need for prophylactic vaccination against Streptococcus pneumoniae (pneumococcus) Z23    5. Preop general physical exam Z01.818        RECOMMENDATIONS:                                                      --Consult hospital rounder / IM to assist post-op medical management    --Patient is to take all scheduled medications on the day of surgery EXCEPT for modifications listed below.    Anticoagulant or Antiplatelet Medication Use  ASPIRIN: Discontinue ASA 5 days prior to procedure to reduce bleeding risk.  It should be resumed post-operatively.          APPROVAL GIVEN to proceed with proposed procedure, without further diagnostic evaluation       Signed Electronically by: Brent Dietz MD    Copy of this evaluation report is provided to requesting physician.    [facility] Preop Guidelines         His condition is stable and there is no particular issue to be addressed before next surgery. Mr Pittman is ok to perform the next surgery without additional visiting us for preoperative physical exam.

## 2017-10-06 NOTE — TELEPHONE ENCOUNTER
Patient called to give the information for his surgery    [facility]  [phone]    Chloe NELSON

## 2017-11-01 ENCOUNTER — TELEPHONE (OUTPATIENT)
Dept: FAMILY MEDICINE | Facility: CLINIC | Age: 67
End: 2017-11-01

## 2017-11-01 NOTE — TELEPHONE ENCOUNTER
His condition is stable and there is no particular issue to be addressed before next surgery. Mr Pittman is ok to perform the next surgery without additional visiting us for preoperative physical exam.

## 2017-11-01 NOTE — TELEPHONE ENCOUNTER
Patient just had a preop done 10/6/17 and he has to have foot surgery 11/16/17  With [facility]-They said that if patient's PCP is ok and thinks patient is still good then he doesn't need another Pre-op but PCP would  Need to addend the 10/6/17 notes to state he is cleared for his foot surgery 11/16/17  Patient will be back in town on 11/9 if he has to come in but he would like to know if his PCP wants him to come in again.  Please call patient at [phone]  Chloe NELSON

## 2017-11-02 NOTE — TELEPHONE ENCOUNTER
Pt was notified of the note below. Please addend the 10/6 OV and state that pt is clear for foot surgery. Thank you.  Gabbi Winters,

## 2017-11-07 NOTE — TELEPHONE ENCOUNTER
Preop faxed to Dr Moss at [facility]. Fax # [phone]  Gabbi Winters,

## 2017-11-09 NOTE — TELEPHONE ENCOUNTER
Patient called back and said that [facility] did not receive his Preop  TC faxed updated Preop to Evelyn at [facility] [phone]  Chloe NELSON

## 2017-11-16 ENCOUNTER — HOSPITAL PATHOLOGY (OUTPATIENT)
Dept: OTHER | Facility: CLINIC | Age: 67
End: 2017-11-16

## 2017-11-20 LAB — COPATH REPORT: NORMAL

## 2018-10-01 ENCOUNTER — APPOINTMENT (OUTPATIENT)
Age: 68
Setting detail: DERMATOLOGY
End: 2018-10-14

## 2018-10-01 DIAGNOSIS — D18.0 HEMANGIOMA: ICD-10-CM

## 2018-10-01 DIAGNOSIS — L81.4 OTHER MELANIN HYPERPIGMENTATION: ICD-10-CM

## 2018-10-01 DIAGNOSIS — L82.0 INFLAMED SEBORRHEIC KERATOSIS: ICD-10-CM

## 2018-10-01 DIAGNOSIS — I78.8 OTHER DISEASES OF CAPILLARIES: ICD-10-CM

## 2018-10-01 DIAGNOSIS — L82.1 OTHER SEBORRHEIC KERATOSIS: ICD-10-CM

## 2018-10-01 DIAGNOSIS — L81.8 OTHER SPECIFIED DISORDERS OF PIGMENTATION: ICD-10-CM

## 2018-10-01 DIAGNOSIS — Z71.89 OTHER SPECIFIED COUNSELING: ICD-10-CM

## 2018-10-01 DIAGNOSIS — I83.9 ASYMPTOMATIC VARICOSE VEINS OF LOWER EXTREMITIES: ICD-10-CM

## 2018-10-01 DIAGNOSIS — L57.0 ACTINIC KERATOSIS: ICD-10-CM

## 2018-10-01 DIAGNOSIS — D22 MELANOCYTIC NEVI: ICD-10-CM

## 2018-10-01 PROBLEM — D18.01 HEMANGIOMA OF SKIN AND SUBCUTANEOUS TISSUE: Status: ACTIVE | Noted: 2018-10-01

## 2018-10-01 PROBLEM — D22.5 MELANOCYTIC NEVI OF TRUNK: Status: ACTIVE | Noted: 2018-10-01

## 2018-10-01 PROBLEM — I83.92 ASYMPTOMATIC VARICOSE VEINS OF LEFT LOWER EXTREMITY: Status: ACTIVE | Noted: 2018-10-01

## 2018-10-01 PROCEDURE — OTHER COUNSELING: OTHER

## 2018-10-01 PROCEDURE — 17000 DESTRUCT PREMALG LESION: CPT | Mod: 59

## 2018-10-01 PROCEDURE — 17003 DESTRUCT PREMALG LES 2-14: CPT

## 2018-10-01 PROCEDURE — OTHER LIQUID NITROGEN: OTHER

## 2018-10-01 PROCEDURE — OTHER MIPS QUALITY: OTHER

## 2018-10-01 PROCEDURE — 99214 OFFICE O/P EST MOD 30 MIN: CPT | Mod: 25

## 2018-10-01 PROCEDURE — OTHER SUNSCREEN RECOMMENDATIONS: OTHER

## 2018-10-01 PROCEDURE — OTHER PULSED-DYE LASER: OTHER

## 2018-10-01 PROCEDURE — 17110 DESTRUCT B9 LESION 1-14: CPT

## 2018-10-01 ASSESSMENT — LOCATION DETAILED DESCRIPTION DERM
LOCATION DETAILED: LEFT NASAL DORSUM
LOCATION DETAILED: RIGHT INFERIOR UPPER BACK
LOCATION DETAILED: LEFT LATERAL ABDOMEN
LOCATION DETAILED: LEFT FOREHEAD
LOCATION DETAILED: LEFT LATERAL FOREHEAD
LOCATION DETAILED: NASAL TIP
LOCATION DETAILED: RIGHT SUPERIOR MEDIAL FOREHEAD
LOCATION DETAILED: RIGHT POSTERIOR SHOULDER
LOCATION DETAILED: NASAL SUPRATIP
LOCATION DETAILED: RIGHT SUPERIOR UPPER BACK
LOCATION DETAILED: LEFT PROXIMAL PRETIBIAL REGION

## 2018-10-01 ASSESSMENT — LOCATION ZONE DERM
LOCATION ZONE: LEG
LOCATION ZONE: NOSE
LOCATION ZONE: TRUNK
LOCATION ZONE: ARM
LOCATION ZONE: FACE

## 2018-10-01 ASSESSMENT — LOCATION SIMPLE DESCRIPTION DERM
LOCATION SIMPLE: RIGHT SHOULDER
LOCATION SIMPLE: ABDOMEN
LOCATION SIMPLE: RIGHT UPPER BACK
LOCATION SIMPLE: RIGHT FOREHEAD
LOCATION SIMPLE: LEFT FOREHEAD
LOCATION SIMPLE: NOSE
LOCATION SIMPLE: LEFT PRETIBIAL REGION

## 2018-10-01 NOTE — PROCEDURE: LIQUID NITROGEN
Number Of Freeze-Thaw Cycles: 1 freeze-thaw cycle
Duration Of Freeze Thaw-Cycle (Seconds): 10
Total Number Of Lesions Treated: 1
Consent: The patient's verbal consent was obtained including but not limited to risks of crusting, scabbing, blistering, scarring, darker or lighter pigmentary change, recurrence, incomplete removal and infection.
Medical Necessity Clause: This procedure was medically necessary because the lesions that were treated were:
Render Post-Care Instructions In Note?: yes
Post-Care Instructions: I reviewed with the patient in detail post-care instructions. Patient is to wear sunprotection, and avoid picking at any of the treated lesions. Pt may apply Vaseline to crusted or scabbing areas.
Detail Level: Detailed
Duration Of Freeze Thaw-Cycle (Seconds): 15
Medical Necessity Information: It is in your best interest to select a reason for this procedure from the list below. All of these items fulfill various CMS LCD requirements except the new and changing color options.
Add 52 Modifier (Optional): no
Post-Care Instructions: I reviewed with the patient in detail post-care instructions. (Written instructions given) Patient is to wear sun protection, and avoid picking at any of the treated lesions. Pt may apply Vaseline to crusted or scabbing areas.
Detail Level: Simple
Consent: The patient's consent was obtained including but not limited to risks of crusting, scabbing, blistering, scarring, darker or lighter pigmentary change, recurrence, incomplete removal and infection.
Total Number Of Aks Treated: 3

## 2018-10-01 NOTE — PROCEDURE: SUNSCREEN RECOMMENDATIONS
Detail Level: Detailed
Products Recommended: The following products were discussed:\\nAnthelios - La Roche Posay\\nCoppertone Sport\\nNeutrogena sunscreens (many to choose from)\\nIntellishade - Revision (tinted)\\nDaily SPF 33 Protectant - Sarwat Kyle (non-tinted)
General Sunscreen Counseling: I recommended a broad spectrum (UVA/B blocking) sunscreen with a SPF of 30 or higher.  I explained that SPF 30 sunscreens block approximately 97 percent of the sun's harmful ultraviolet rays.  Sunscreens should be applied at least 15 minutes prior to expected sun exposure and then every 2 hours after that as long as sun exposure continues. If swimming or exercising, sunscreen should be reapplied every 45 minutes to an hour after getting wet or sweating.  One ounce, or the equivalent of a shot glass full of sunscreen, is adequate to protect the skin not covered by a bathing suit. I also recommended a lip balm with a SPF 30 sunscreen as well. Sun protective clothing can be used in lieu of sunscreen, but must be worn the entire time you are exposed to the sun's rays.  Such clothing is woven of fibers with a chemical structure that absorbs or reflects ultraviolet radiation.  The best hats for sun protection have a full 360 degree brim.  Baseball style caps do not protect the ears or the back and sides of the neck and are inadequate for effective sun protection.

## 2018-10-01 NOTE — PROCEDURE: PULSED-DYE LASER
Delay Time (Ms): 20
Treated Area: small area
Pulse Duration: 3 ms
Fluence In J/Cm2 (Optional): 7
Location (Required For Details To Render In Note But Body Touch Will Also Count For First Location): nose
Pulse Duration: 10 ms
Spot Size: 10 mm
Detail Level: Detailed
Immediate Endpoint: erythema
Cryogen Time (Ms): 30
Price (Use Numbers Only, No Special Characters Or $): 50
Delay Time (Ms): 10
Post-Care Instructions: I reviewed with the patient in detail post-care instructions. Patient should stay away from the sun and wear sun protection until treated areas are fully healed.
Fluence In J/Cm2 (Optional): 8.00
Spot Size: 7 mm
Consent: Written consent obtained, risks reviewed including but not limited to crusting, scabbing, blistering, scarring, darker or lighter pigmentary change, incidental hair removal, bruising, and/or incomplete removal.
Pulse Count (Optional): 6
Pulse Duration: 6 ms
Laser Type: Pulsed-dye laser 595nm
Post-Procedure Care: Post care reviewed with patient.

## 2018-10-01 NOTE — PROCEDURE: MIPS QUALITY
Quality 110: Preventive Care And Screening: Influenza Immunization: Influenza immunization was not ordered or administered, reason not given
Quality 131: Pain Assessment And Follow-Up: Pain assessment using a standardized tool is documented as negative, no follow-up plan required
Detail Level: Detailed
Quality 130: Documentation Of Current Medications In The Medical Record: Current Medications Documented
Quality 431: Preventive Care And Screening: Unhealthy Alcohol Use - Screening: Patient screened for unhealthy alcohol use using a single question and scores less than 2 times per year
Quality 226: Preventive Care And Screening: Tobacco Use: Screening And Cessation Intervention: Patient screened for tobacco and is an ex-smoker

## 2019-04-22 ENCOUNTER — APPOINTMENT (OUTPATIENT)
Age: 69
Setting detail: DERMATOLOGY
End: 2019-06-17

## 2019-04-22 DIAGNOSIS — D18.0 HEMANGIOMA: ICD-10-CM

## 2019-04-22 DIAGNOSIS — D22 MELANOCYTIC NEVI: ICD-10-CM

## 2019-04-22 DIAGNOSIS — L57.0 ACTINIC KERATOSIS: ICD-10-CM

## 2019-04-22 DIAGNOSIS — L82.1 OTHER SEBORRHEIC KERATOSIS: ICD-10-CM

## 2019-04-22 DIAGNOSIS — Z71.89 OTHER SPECIFIED COUNSELING: ICD-10-CM

## 2019-04-22 PROBLEM — D22.61 MELANOCYTIC NEVI OF RIGHT UPPER LIMB, INCLUDING SHOULDER: Status: ACTIVE | Noted: 2019-04-22

## 2019-04-22 PROBLEM — D22.39 MELANOCYTIC NEVI OF OTHER PARTS OF FACE: Status: ACTIVE | Noted: 2019-04-22

## 2019-04-22 PROBLEM — D18.01 HEMANGIOMA OF SKIN AND SUBCUTANEOUS TISSUE: Status: ACTIVE | Noted: 2019-04-22

## 2019-04-22 PROBLEM — D22.62 MELANOCYTIC NEVI OF LEFT UPPER LIMB, INCLUDING SHOULDER: Status: ACTIVE | Noted: 2019-04-22

## 2019-04-22 PROCEDURE — OTHER PULSED-DYE LASER: OTHER

## 2019-04-22 PROCEDURE — OTHER COUNSELING: OTHER

## 2019-04-22 PROCEDURE — OTHER MIPS QUALITY: OTHER

## 2019-04-22 PROCEDURE — OTHER LIQUID NITROGEN: OTHER

## 2019-04-22 PROCEDURE — 99214 OFFICE O/P EST MOD 30 MIN: CPT | Mod: 25

## 2019-04-22 PROCEDURE — 17000 DESTRUCT PREMALG LESION: CPT

## 2019-04-22 ASSESSMENT — LOCATION ZONE DERM
LOCATION ZONE: ARM
LOCATION ZONE: FACE
LOCATION ZONE: NOSE

## 2019-04-22 ASSESSMENT — LOCATION DETAILED DESCRIPTION DERM
LOCATION DETAILED: RIGHT DISTAL LATERAL POSTERIOR UPPER ARM
LOCATION DETAILED: LEFT MEDIAL ZYGOMA
LOCATION DETAILED: LEFT DISTAL POSTERIOR UPPER ARM
LOCATION DETAILED: LEFT PROXIMAL POSTERIOR UPPER ARM
LOCATION DETAILED: NASAL TIP
LOCATION DETAILED: LEFT SUPERIOR MEDIAL FOREHEAD
LOCATION DETAILED: RIGHT PROXIMAL LATERAL POSTERIOR UPPER ARM

## 2019-04-22 ASSESSMENT — LOCATION SIMPLE DESCRIPTION DERM
LOCATION SIMPLE: LEFT ZYGOMA
LOCATION SIMPLE: NOSE
LOCATION SIMPLE: RIGHT UPPER ARM
LOCATION SIMPLE: LEFT UPPER ARM
LOCATION SIMPLE: LEFT FOREHEAD

## 2019-04-22 NOTE — PROCEDURE: MIPS QUALITY
Quality 110: Preventive Care And Screening: Influenza Immunization: Influenza immunization was not ordered or administered, reason not given
Detail Level: Detailed
Quality 226: Preventive Care And Screening: Tobacco Use: Screening And Cessation Intervention: Patient screened for tobacco and is an ex-smoker
Quality 131: Pain Assessment And Follow-Up: Pain assessment using a standardized tool is documented as negative, no follow-up plan required
Quality 431: Preventive Care And Screening: Unhealthy Alcohol Use - Screening: Patient screened for unhealthy alcohol use using a single question and scores less than 2 times per year
Quality 130: Documentation Of Current Medications In The Medical Record: Current Medications Documented

## 2019-04-22 NOTE — PROCEDURE: PULSED-DYE LASER
Pulse Count (Optional): 7
Treated Area: small area
Cryogen Time (Ms): 30
Pulse Duration: 10 ms
Cryogen Time (Ms): 20
Delay Time (Ms): 10
Spot Size: 7 mm
Pulse Duration: 3 ms
Detail Level: Simple
Laser Type: Pulsed-dye laser 595nm
Immediate Endpoint: erythema
Consent: Written consent obtained, risks reviewed including but not limited to crusting, scabbing, blistering, scarring, darker or lighter pigmentary change, incidental hair removal, bruising, and/or incomplete removal.
Location (Required For Details To Render In Note But Body Touch Will Also Count For First Location): nose
Price (Use Numbers Only, No Special Characters Or $): 0
Post-Care Instructions: I reviewed with the patient in detail post-care instructions. Patient should stay away from the sun and wear sun protection until treated areas are fully healed.
Fluence In J/Cm2 (Optional): 8.0
Spot Size: 10 mm
Pulse Duration: 6 ms
Post-Procedure Care: Post care reviewed with patient.

## 2019-04-22 NOTE — PROCEDURE: LIQUID NITROGEN
Render Post-Care Instructions In Note?: yes
Render Note In Bullet Format When Appropriate: No
Detail Level: Detailed
Number Of Freeze-Thaw Cycles: 1 freeze-thaw cycle
Post-Care Instructions: I reviewed with the patient in detail post-care instructions. Patient is to wear sunprotection, and avoid picking at any of the treated lesions. Pt may apply Vaseline to crusted or scabbing areas.
Duration Of Freeze Thaw-Cycle (Seconds): 10
Consent: The patient's consent was obtained including but not limited to risks of crusting, scabbing, blistering, scarring, darker or lighter pigmentary change, recurrence, incomplete removal and infection.

## 2019-10-25 ENCOUNTER — OFFICE VISIT (OUTPATIENT)
Dept: FAMILY MEDICINE | Facility: CLINIC | Age: 69
End: 2019-10-25
Payer: COMMERCIAL

## 2019-10-25 VITALS
RESPIRATION RATE: 14 BRPM | HEART RATE: 76 BPM | SYSTOLIC BLOOD PRESSURE: 122 MMHG | DIASTOLIC BLOOD PRESSURE: 74 MMHG | BODY MASS INDEX: 29.68 KG/M2 | OXYGEN SATURATION: 98 % | HEIGHT: 71 IN | TEMPERATURE: 97.5 F | WEIGHT: 212 LBS

## 2019-10-25 DIAGNOSIS — Z01.818 PREOP GENERAL PHYSICAL EXAM: Primary | ICD-10-CM

## 2019-10-25 DIAGNOSIS — I10 ESSENTIAL HYPERTENSION: ICD-10-CM

## 2019-10-25 DIAGNOSIS — E78.5 HYPERLIPIDEMIA LDL GOAL <130: ICD-10-CM

## 2019-10-25 DIAGNOSIS — Z23 NEED FOR VACCINATION: ICD-10-CM

## 2019-10-25 LAB
ALBUMIN SERPL-MCNC: 4.2 G/DL (ref 3.4–5)
ALP SERPL-CCNC: 80 U/L (ref 40–150)
ALT SERPL W P-5'-P-CCNC: 27 U/L (ref 0–70)
ANION GAP SERPL CALCULATED.3IONS-SCNC: 3 MMOL/L (ref 3–14)
AST SERPL W P-5'-P-CCNC: 18 U/L (ref 0–45)
BILIRUB SERPL-MCNC: 0.6 MG/DL (ref 0.2–1.3)
BUN SERPL-MCNC: 15 MG/DL (ref 7–30)
CALCIUM SERPL-MCNC: 9 MG/DL (ref 8.5–10.1)
CHLORIDE SERPL-SCNC: 104 MMOL/L (ref 94–109)
CO2 SERPL-SCNC: 27 MMOL/L (ref 20–32)
CREAT SERPL-MCNC: 0.88 MG/DL (ref 0.66–1.25)
ERYTHROCYTE [DISTWIDTH] IN BLOOD BY AUTOMATED COUNT: 13.1 % (ref 10–15)
GFR SERPL CREATININE-BSD FRML MDRD: 88 ML/MIN/{1.73_M2}
GLUCOSE SERPL-MCNC: 104 MG/DL (ref 70–99)
HCT VFR BLD AUTO: 37.2 % (ref 40–53)
HGB BLD-MCNC: 12.4 G/DL (ref 13.3–17.7)
MCH RBC QN AUTO: 30.5 PG (ref 26.5–33)
MCHC RBC AUTO-ENTMCNC: 33.3 G/DL (ref 31.5–36.5)
MCV RBC AUTO: 91 FL (ref 78–100)
PLATELET # BLD AUTO: 301 10E9/L (ref 150–450)
POTASSIUM SERPL-SCNC: 4.2 MMOL/L (ref 3.4–5.3)
PROT SERPL-MCNC: 7.7 G/DL (ref 6.8–8.8)
RBC # BLD AUTO: 4.07 10E12/L (ref 4.4–5.9)
SODIUM SERPL-SCNC: 134 MMOL/L (ref 133–144)
WBC # BLD AUTO: 5.3 10E9/L (ref 4–11)

## 2019-10-25 PROCEDURE — 93000 ELECTROCARDIOGRAM COMPLETE: CPT | Performed by: FAMILY MEDICINE

## 2019-10-25 PROCEDURE — 99215 OFFICE O/P EST HI 40 MIN: CPT | Performed by: FAMILY MEDICINE

## 2019-10-25 PROCEDURE — 85027 COMPLETE CBC AUTOMATED: CPT | Performed by: FAMILY MEDICINE

## 2019-10-25 PROCEDURE — 80053 COMPREHEN METABOLIC PANEL: CPT | Performed by: FAMILY MEDICINE

## 2019-10-25 PROCEDURE — 36415 COLL VENOUS BLD VENIPUNCTURE: CPT | Performed by: FAMILY MEDICINE

## 2019-10-25 RX ORDER — ATORVASTATIN CALCIUM 20 MG/1
20 TABLET, FILM COATED ORAL DAILY
Refills: 0 | COMMUNITY
Start: 2019-09-30 | End: 2020-08-24

## 2019-10-25 RX ORDER — LISINOPRIL/HYDROCHLOROTHIAZIDE 10-12.5 MG
1 TABLET ORAL DAILY
Refills: 3 | COMMUNITY
Start: 2019-06-27 | End: 2021-06-11

## 2019-10-25 ASSESSMENT — MIFFLIN-ST. JEOR: SCORE: 1753.76

## 2019-10-25 NOTE — LETTER
October 28, 2019      Dominguez Pittman  [address]  [place] [address]        Dear ,    We are writing to inform you of your test results.    Your lab results including complete blood cell counts/electrolyte balance and kidney function were all stable for your upcoming surgery.      Resulted Orders   CBC with platelets   Result Value Ref Range    WBC 5.3 4.0 - 11.0 10e9/L    RBC Count 4.07 (L) 4.4 - 5.9 10e12/L    Hemoglobin 12.4 (L) 13.3 - 17.7 g/dL    Hematocrit 37.2 (L) 40.0 - 53.0 %    MCV 91 78 - 100 fl    MCH 30.5 26.5 - 33.0 pg    MCHC 33.3 31.5 - 36.5 g/dL    RDW 13.1 10.0 - 15.0 %    Platelet Count 301 150 - 450 10e9/L   Comprehensive metabolic panel   Result Value Ref Range    Sodium 134 133 - 144 mmol/L    Potassium 4.2 3.4 - 5.3 mmol/L    Chloride 104 94 - 109 mmol/L    Carbon Dioxide 27 20 - 32 mmol/L    Anion Gap 3 3 - 14 mmol/L    Glucose 104 (H) 70 - 99 mg/dL      Comment:      Non Fasting    Urea Nitrogen 15 7 - 30 mg/dL    Creatinine 0.88 0.66 - 1.25 mg/dL    GFR Estimate 88 >60 mL/min/[1.73_m2]      Comment:      Non  GFR Calc  Starting 12/18/2018, serum creatinine based estimated GFR (eGFR) will be   calculated using the Chronic Kidney Disease Epidemiology Collaboration   (CKD-EPI) equation.      GFR Estimate If Black >90 >60 mL/min/[1.73_m2]      Comment:       GFR Calc  Starting 12/18/2018, serum creatinine based estimated GFR (eGFR) will be   calculated using the Chronic Kidney Disease Epidemiology Collaboration   (CKD-EPI) equation.      Calcium 9.0 8.5 - 10.1 mg/dL    Bilirubin Total 0.6 0.2 - 1.3 mg/dL    Albumin 4.2 3.4 - 5.0 g/dL    Protein Total 7.7 6.8 - 8.8 g/dL    Alkaline Phosphatase 80 40 - 150 U/L    ALT 27 0 - 70 U/L    AST 18 0 - 45 U/L       If you have any questions or concerns, please call the clinic at the number listed above.       Sincerely,        Brent Dietz MD

## 2019-10-25 NOTE — PROGRESS NOTES
[facility] [place]  [address] [address]  [phone]  Dept: [phone]    PRE-OP EVALUATION:  Today's date: 10/25/2019    Dominguez Pittman (: 1950) presents for pre-operative evaluation assessment as requested by Dr. Harjeet Nicholson.  He requires evaluation and anesthesia risk assessment prior to undergoing surgery/procedure.    Proposed Surgery/ Procedure: Rotator cuff   Date of Surgery/ Procedure: 19  Time of Surgery/ Procedure: 9:00 AM  Hospital/Surgical Facility: [facility]   Fax number for surgical facility: [phone]  Primary Physician: Brent Dietz  Type of Anesthesia Anticipated: to be determined    Patient has a Health Care Directive or Living Will:  YES     1. NO - Do you have a history of heart attack, stroke, stent, bypass or surgery on an artery in the head, neck, heart or legs?  2. NO - Do you ever have any pain or discomfort in your chest?  3. NO - Do you have a history of  Heart Failure?  4. NO - Are you troubled by shortness of breath when: walking on the level, up a slight hill or at night?  5. NO - Do you currently have a cold, bronchitis or other respiratory infection?  6. NO - Do you have a cough, shortness of breath or wheezing?  7. NO - Do you sometimes get pains in the calves of your legs when you walk?  8. NO - Do you or anyone in your family have previous history of blood clots?  9. NO - Do you or does anyone in your family have a serious bleeding problem such as prolonged bleeding following surgeries or cuts?  10. NO - Have you ever had problems with anemia or been told to take iron pills?  11. NO - Have you had any abnormal blood loss such as black, tarry or bloody stools, or abnormal vaginal bleeding?  12. NO - Have you ever had a blood transfusion?  13. NO - Have you or any of your relatives ever had problems with anesthesia?  14. NO - Do you have sleep apnea, excessive snoring or daytime drowsiness?  15. NO  - Do you have any prosthetic heart valves?  16. YES - Do you have prosthetic joints?  17. NO - Is there any chance that you may be pregnant?      HPI:     See problem list for active medical problems.  Problems all longstanding and stable, except as noted/documented.  See ROS for pertinent symptoms related to these conditions.      MEDICAL HISTORY:     Patient Active Problem List    Diagnosis Date Noted     PAC (premature atrial contraction) 05/01/2014     Priority: Medium     Left inguinal hernia 07/18/2013     Priority: Medium     Plantar warts 07/18/2013     Priority: Medium     Open wound of scalp 01/09/2013     Priority: Medium     Problem list name updated by automated process. Provider to review       Laceration 11/19/2012     Priority: Medium     Head injury 11/19/2012     Priority: Medium     Ganglion cyst 07/24/2012     Priority: Medium     Throat clearing 07/24/2012     Priority: Medium     Advance care planning 07/19/2011     Priority: Medium     Advance Care Planning 6/17/2016: Receipt of ACP document:  Received: Health Care Directive which was witnessed or notarized on 1/27/16.  Document previously scanned on 3/24/16.  Validation form completed and sent to be scanned.  Code Status needs to be updated to reflect choices in most recent ACP document. Confirmed/documented designated decision maker(s).  Added by Ines De León   Advance Care Planning Liaison  Advance Care Planning 7/19/11: Patient will bring in Copy       Erectile dysfunction 07/19/2011     Priority: Medium     HYPERLIPIDEMIA LDL GOAL <130 10/31/2010     Priority: Medium     Varicose veins of legs 06/28/2010     Priority: Medium     Arthritis 07/14/2008     Priority: Medium      Past Medical History:   Diagnosis Date     Arthritis      High cholesterol      Hx musculoskletl dis NEC      Hypertension      Other nonspecific findings on examination of blood(790.99)      Personal history of other disorders of nervous system and sense  organs      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      Past Surgical History:   Procedure Laterality Date     ARTHRODESIS WRIST Left 3/18/2016    Procedure: ARTHRODESIS WRIST;  Surgeon: Mayda Teresa MD;  Location: [facility]     ARTHRODESIS WRIST Left 12/21/2016    Procedure: ARTHRODESIS WRIST;  Surgeon: Mayda Teresa MD;  Location: [facility]     C NONSPECIFIC PROCEDURE      diskectomy L5s     COLONOSCOPY  7/27/2012    Procedure: COLONOSCOPY;  COLONOSCOPY;  Surgeon: Kris Garcia MD;  Location:  [facility]     COLONOSCOPY N/A 8/1/2017    Procedure: COLONOSCOPY;  COLONOSCOPY;  Surgeon: Emil Plaza MD;  Location:  GI     ENDOSCOPIC RELEASE CARPAL TUNNEL Left 3/18/2016    Procedure: ENDOSCOPIC RELEASE CARPAL TUNNEL;  Surgeon: Mayda Teresa MD;  Location: [facility]     JOINT REPLACEMTN, KNEE RT/LT  2007    Joint Replacement knee LT, right hip replacment      REMOVE HARDWARE WRIST Left 12/21/2016    Procedure: REMOVE HARDWARE WRIST;  Surgeon: Mayda Teresa MD;  Location: [facility]     SURGICAL HISTORY OF -       arthroscopyx 3     SURGICAL HISTORY OF -       lt cholesteotoma     Current Outpatient Medications   Medication Sig Dispense Refill     ADVIL 200 MG PO TABS prn        ASPIRIN PO Take 81 mg by mouth daily       CRESTOR 10 MG tablet TAKE 1 TABLET (10 MG) BY MOUTH DAILY 30 tablet 0     fish oil-omega-3 fatty acids 1000 MG capsule Take 2 capsules by mouth daily. 180 capsule 12     GLUCOSAMINE SULFATE PO Take 1,500 mg by mouth daily.         ketorolac (ACULAR) 0.5 % ophthalmic solution        moxifloxacin (VIGAMOX) 0.5 % ophthalmic solution        MULTI-VITAMIN OR TABS 1 q day   0     prednisoLONE acetate (PRED FORTE) 1 % ophthalmic susp        TYLENOL 325 MG PO TABS prn        VITAMIN D, CHOLECALCIFEROL, PO Take by mouth daily       OTC products: None, except as noted above    Allergies   Allergen Reactions     Penicillins Hives  "     Latex Allergy: NO    Social History     Tobacco Use     Smoking status: Former Smoker     Packs/day: 0.50     Years: 2.00     Pack years: 1.00     Types: Cigarettes     Last attempt to quit: 1973     Years since quittin.7     Smokeless tobacco: Never Used   Substance Use Topics     Alcohol use: Yes     Alcohol/week: 10.0 standard drinks     Comment: 3times/week     History   Drug Use No       REVIEW OF SYSTEMS:   CONSTITUTIONAL: NEGATIVE for fever, chills, change in weight  ENT/MOUTH: NEGATIVE for ear, mouth and throat problems  RESP: NEGATIVE for significant cough or SOB  CV: NEGATIVE for chest pain, palpitations or peripheral edema    EXAM:   /74 (Cuff Size: Adult Regular)   Pulse 76   Temp 97.5  F (36.4  C) (Tympanic)   Resp 14   Ht 1.803 m (5' 11\")   Wt 96.2 kg (212 lb)   SpO2 98%   BMI 29.57 kg/m    GENERAL APPEARANCE: healthy, alert and no distress  HENT: ear canals and TM's normal and nose and mouth without ulcers or lesions  RESP: lungs clear to auscultation - no rales, rhonchi or wheezes  CV: regular rate and rhythm, normal S1 S2, no S3 or S4 and no murmur, click or rub   ABDOMEN: soft, nontender, no HSM or masses and bowel sounds normal  NEURO: Normal strength and tone, sensory exam grossly normal, mentation intact and speech normal    DIAGNOSTICS:     Labs Resulted Today:   Results for orders placed or performed in visit on 10/25/19   CBC with platelets   Result Value Ref Range    WBC 5.3 4.0 - 11.0 10e9/L    RBC Count 4.07 (L) 4.4 - 5.9 10e12/L    Hemoglobin 12.4 (L) 13.3 - 17.7 g/dL    Hematocrit 37.2 (L) 40.0 - 53.0 %    MCV 91 78 - 100 fl    MCH 30.5 26.5 - 33.0 pg    MCHC 33.3 31.5 - 36.5 g/dL    RDW 13.1 10.0 - 15.0 %    Platelet Count 301 150 - 450 10e9/L   Comprehensive metabolic panel   Result Value Ref Range    Sodium 134 133 - 144 mmol/L    Potassium 4.2 3.4 - 5.3 mmol/L    Chloride 104 94 - 109 mmol/L    Carbon Dioxide 27 20 - 32 mmol/L    Anion Gap 3 3 - 14 mmol/L "    Glucose 104 (H) 70 - 99 mg/dL    Urea Nitrogen 15 7 - 30 mg/dL    Creatinine 0.88 0.66 - 1.25 mg/dL    GFR Estimate 88 >60 mL/min/[1.73_m2]    GFR Estimate If Black >90 >60 mL/min/[1.73_m2]    Calcium 9.0 8.5 - 10.1 mg/dL    Bilirubin Total 0.6 0.2 - 1.3 mg/dL    Albumin 4.2 3.4 - 5.0 g/dL    Protein Total 7.7 6.8 - 8.8 g/dL    Alkaline Phosphatase 80 40 - 150 U/L    ALT 27 0 - 70 U/L    AST 18 0 - 45 U/L     EKG: WNL  Recent Labs   Lab Test 05/01/14  0952 07/18/13  0953    140   POTASSIUM 4.4 5.1   CR 0.90 0.82        IMPRESSION:   Reason for surgery/procedure: left shoulder rotator cuff repair     The proposed surgical procedure is considered INTERMEDIATE risk.    REVISED CARDIAC RISK INDEX  The patient has the following serious cardiovascular risks for perioperative complications such as (MI, PE, VFib and 3  AV Block):  High risk surgery (>5% cardiac complication risk)  INTERPRETATION: 1 risks: Class II (low risk - 0.9% complication rate)    The patient has the following additional risks for perioperative complications:  The ASCVD Risk score (Yoavumang MAURICIO Jr., et al., 2013) failed to calculate for the following reasons:    Cannot find a previous HDL lab    Cannot find a previous total cholesterol lab      ICD-10-CM    1. Preop general physical exam Z01.818 EKG 12-lead complete w/read - Clinics     CBC with platelets     Comprehensive metabolic panel   2. Need for vaccination Z23    3. Hyperlipidemia LDL goal <130 E78.5 EKG 12-lead complete w/read - Clinics     CBC with platelets     Comprehensive metabolic panel   4. Essential hypertension I10 EKG 12-lead complete w/read - Clinics     CBC with platelets     Comprehensive metabolic panel       RECOMMENDATIONS:     --Patient is to take all scheduled medications on the day of surgery EXCEPT for modifications listed below.    Anticoagulant or Antiplatelet Medication Use  ASPIRIN: Discontinue ASA 7 days prior to procedure to reduce bleeding risk.  It should be  resumed post-operatively.  NSAIDS: Ibuprofen (Motrin):         Stop 7 day prior to surgery        ACE Inhibitor or Angiotensin Receptor Blocker (ARB) Use  Ace inhibitor or Angiotensin Receptor Blocker (ARB) and will continue this medication due to the higher risk of uncontrolled perioperative hypertension (e.g. neurosurgical procedure)      APPROVAL GIVEN to proceed with proposed procedure, without further diagnostic evaluation       Signed Electronically by: Brent Dietz MD    Copy of this evaluation report is provided to requesting physician.    [facility] Preop Guidelines    Revised Cardiac Risk Index

## 2020-07-08 ENCOUNTER — APPOINTMENT (OUTPATIENT)
Age: 70
Setting detail: DERMATOLOGY
End: 2020-07-09

## 2020-07-08 DIAGNOSIS — L82.1 OTHER SEBORRHEIC KERATOSIS: ICD-10-CM

## 2020-07-08 DIAGNOSIS — D22 MELANOCYTIC NEVI: ICD-10-CM

## 2020-07-08 DIAGNOSIS — L20.89 OTHER ATOPIC DERMATITIS: ICD-10-CM

## 2020-07-08 DIAGNOSIS — R20.2 PARESTHESIA OF SKIN: ICD-10-CM

## 2020-07-08 DIAGNOSIS — D18.0 HEMANGIOMA: ICD-10-CM

## 2020-07-08 DIAGNOSIS — L81.4 OTHER MELANIN HYPERPIGMENTATION: ICD-10-CM

## 2020-07-08 DIAGNOSIS — L81.8 OTHER SPECIFIED DISORDERS OF PIGMENTATION: ICD-10-CM

## 2020-07-08 PROBLEM — L20.84 INTRINSIC (ALLERGIC) ECZEMA: Status: ACTIVE | Noted: 2020-07-08

## 2020-07-08 PROBLEM — D18.01 HEMANGIOMA OF SKIN AND SUBCUTANEOUS TISSUE: Status: ACTIVE | Noted: 2020-07-08

## 2020-07-08 PROBLEM — D22.5 MELANOCYTIC NEVI OF TRUNK: Status: ACTIVE | Noted: 2020-07-08

## 2020-07-08 PROCEDURE — OTHER MIPS QUALITY: OTHER

## 2020-07-08 PROCEDURE — OTHER PRESCRIPTION: OTHER

## 2020-07-08 PROCEDURE — 99214 OFFICE O/P EST MOD 30 MIN: CPT

## 2020-07-08 PROCEDURE — OTHER COUNSELING: OTHER

## 2020-07-08 RX ORDER — TRIAMCINOLONE ACETONIDE 1 MG/G
APPLY THIN COAT CREAM TOPICAL BID
Qty: 30 | Refills: 2 | Status: ERX | COMMUNITY
Start: 2020-07-08

## 2020-07-08 ASSESSMENT — LOCATION SIMPLE DESCRIPTION DERM
LOCATION SIMPLE: RIGHT FOREHEAD
LOCATION SIMPLE: RIGHT UPPER BACK
LOCATION SIMPLE: ABDOMEN
LOCATION SIMPLE: RIGHT THUMB
LOCATION SIMPLE: RIGHT SHOULDER
LOCATION SIMPLE: LEFT UPPER BACK
LOCATION SIMPLE: LEFT TEMPLE
LOCATION SIMPLE: LEFT FOREHEAD

## 2020-07-08 ASSESSMENT — LOCATION DETAILED DESCRIPTION DERM
LOCATION DETAILED: LEFT INFERIOR LATERAL FOREHEAD
LOCATION DETAILED: RIGHT FOREHEAD
LOCATION DETAILED: RIGHT POSTERIOR SHOULDER
LOCATION DETAILED: LEFT INFERIOR TEMPLE
LOCATION DETAILED: RIGHT INFERIOR UPPER BACK
LOCATION DETAILED: RIGHT SUPERIOR UPPER BACK
LOCATION DETAILED: LEFT LATERAL ABDOMEN
LOCATION DETAILED: LEFT SUPERIOR UPPER BACK
LOCATION DETAILED: RIGHT PROXIMAL DORSAL THUMB

## 2020-07-08 ASSESSMENT — LOCATION ZONE DERM
LOCATION ZONE: ARM
LOCATION ZONE: FINGER
LOCATION ZONE: FACE
LOCATION ZONE: TRUNK

## 2020-07-08 NOTE — PROCEDURE: MIPS QUALITY
Quality 130: Documentation Of Current Medications In The Medical Record: Current Medications Documented
Quality 226: Preventive Care And Screening: Tobacco Use: Screening And Cessation Intervention: Patient screened for tobacco use, is a smoker AND received Cessation Counseling
Quality 431: Preventive Care And Screening: Unhealthy Alcohol Use - Screening: Patient screened for unhealthy alcohol use using a single question and scores 2 or greater episodes per year and brief intervention occurred
Detail Level: Detailed
Quality 110: Preventive Care And Screening: Influenza Immunization: Influenza Immunization Administered during Influenza season
Quality 111:Pneumonia Vaccination Status For Older Adults: Pneumococcal Vaccination Previously Received

## 2020-07-08 NOTE — PROCEDURE: COUNSELING
Detail Level: Generalized
Detail Level: Detailed
Patient Specific Counseling (Will Not Stick From Patient To Patient): Okay to apply topical Triamcinolone cream to this affected skin for itch QD-BID as needed
Detail Level: Simple
Patient Specific Counseling (Will Not Stick From Patient To Patient): Apply thin coat of Triamcinolone cream to dry patches on face once a day for 2 weeks, if lesions do not resolve please contact clinic. \\n\\nApply Triamcinolone cream to affected skin on thumb twice a day until resolved.

## 2020-08-12 RX ORDER — ATORVASTATIN CALCIUM 20 MG/1
TABLET, FILM COATED ORAL
Qty: 90 TABLET | Refills: 0 | OUTPATIENT
Start: 2020-08-12

## 2020-08-24 ENCOUNTER — OFFICE VISIT (OUTPATIENT)
Dept: FAMILY MEDICINE | Facility: CLINIC | Age: 70
End: 2020-08-24
Payer: COMMERCIAL

## 2020-08-24 VITALS
HEIGHT: 71 IN | WEIGHT: 212.4 LBS | DIASTOLIC BLOOD PRESSURE: 70 MMHG | BODY MASS INDEX: 29.73 KG/M2 | OXYGEN SATURATION: 98 % | HEART RATE: 87 BPM | SYSTOLIC BLOOD PRESSURE: 110 MMHG | TEMPERATURE: 96.7 F

## 2020-08-24 DIAGNOSIS — Z00.00 ENCOUNTER FOR MEDICARE ANNUAL WELLNESS EXAM: ICD-10-CM

## 2020-08-24 DIAGNOSIS — E78.5 HYPERLIPIDEMIA LDL GOAL <130: ICD-10-CM

## 2020-08-24 DIAGNOSIS — I10 ESSENTIAL HYPERTENSION: ICD-10-CM

## 2020-08-24 DIAGNOSIS — Z00.00 ENCOUNTER FOR ROUTINE ADULT HEALTH EXAMINATION WITHOUT ABNORMAL FINDINGS: Primary | ICD-10-CM

## 2020-08-24 DIAGNOSIS — Z12.5 SCREENING FOR PROSTATE CANCER: ICD-10-CM

## 2020-08-24 DIAGNOSIS — Z13.0 SCREENING FOR DEFICIENCY ANEMIA: ICD-10-CM

## 2020-08-24 DIAGNOSIS — Z13.1 SCREENING FOR DIABETES MELLITUS: ICD-10-CM

## 2020-08-24 LAB
ANION GAP SERPL CALCULATED.3IONS-SCNC: 7 MMOL/L (ref 3–14)
BASOPHILS # BLD AUTO: 0 10E9/L (ref 0–0.2)
BASOPHILS NFR BLD AUTO: 0.5 %
BUN SERPL-MCNC: 17 MG/DL (ref 7–30)
CALCIUM SERPL-MCNC: 9 MG/DL (ref 8.5–10.1)
CHLORIDE SERPL-SCNC: 103 MMOL/L (ref 94–109)
CHOLEST SERPL-MCNC: 203 MG/DL
CO2 SERPL-SCNC: 25 MMOL/L (ref 20–32)
CREAT SERPL-MCNC: 0.85 MG/DL (ref 0.66–1.25)
DIFFERENTIAL METHOD BLD: ABNORMAL
EOSINOPHIL # BLD AUTO: 0.1 10E9/L (ref 0–0.7)
EOSINOPHIL NFR BLD AUTO: 1.9 %
ERYTHROCYTE [DISTWIDTH] IN BLOOD BY AUTOMATED COUNT: 13.6 % (ref 10–15)
FERRITIN SERPL-MCNC: 35 NG/ML (ref 26–388)
GFR SERPL CREATININE-BSD FRML MDRD: 89 ML/MIN/{1.73_M2}
GLUCOSE SERPL-MCNC: 103 MG/DL (ref 70–99)
HCT VFR BLD AUTO: 35.6 % (ref 40–53)
HDLC SERPL-MCNC: 56 MG/DL
HGB BLD-MCNC: 12.1 G/DL (ref 13.3–17.7)
IRON SATN MFR SERPL: 20 % (ref 15–46)
IRON SERPL-MCNC: 79 UG/DL (ref 35–180)
LDLC SERPL CALC-MCNC: 124 MG/DL
LYMPHOCYTES # BLD AUTO: 1.6 10E9/L (ref 0.8–5.3)
LYMPHOCYTES NFR BLD AUTO: 27.3 %
MCH RBC QN AUTO: 30.6 PG (ref 26.5–33)
MCHC RBC AUTO-ENTMCNC: 34 G/DL (ref 31.5–36.5)
MCV RBC AUTO: 90 FL (ref 78–100)
MONOCYTES # BLD AUTO: 0.5 10E9/L (ref 0–1.3)
MONOCYTES NFR BLD AUTO: 8.4 %
NEUTROPHILS # BLD AUTO: 3.6 10E9/L (ref 1.6–8.3)
NEUTROPHILS NFR BLD AUTO: 61.9 %
NONHDLC SERPL-MCNC: 147 MG/DL
PLATELET # BLD AUTO: 273 10E9/L (ref 150–450)
POTASSIUM SERPL-SCNC: 4.7 MMOL/L (ref 3.4–5.3)
PSA SERPL-ACNC: 0.55 UG/L (ref 0–4)
RBC # BLD AUTO: 3.95 10E12/L (ref 4.4–5.9)
SODIUM SERPL-SCNC: 135 MMOL/L (ref 133–144)
TIBC SERPL-MCNC: 398 UG/DL (ref 240–430)
TRIGL SERPL-MCNC: 113 MG/DL
WBC # BLD AUTO: 5.8 10E9/L (ref 4–11)

## 2020-08-24 PROCEDURE — 80061 LIPID PANEL: CPT | Performed by: NURSE PRACTITIONER

## 2020-08-24 PROCEDURE — 83540 ASSAY OF IRON: CPT | Performed by: NURSE PRACTITIONER

## 2020-08-24 PROCEDURE — 85025 COMPLETE CBC W/AUTO DIFF WBC: CPT | Performed by: NURSE PRACTITIONER

## 2020-08-24 PROCEDURE — 82728 ASSAY OF FERRITIN: CPT | Performed by: NURSE PRACTITIONER

## 2020-08-24 PROCEDURE — G0438 PPPS, INITIAL VISIT: HCPCS | Performed by: NURSE PRACTITIONER

## 2020-08-24 PROCEDURE — 83550 IRON BINDING TEST: CPT | Performed by: NURSE PRACTITIONER

## 2020-08-24 PROCEDURE — 36415 COLL VENOUS BLD VENIPUNCTURE: CPT | Performed by: NURSE PRACTITIONER

## 2020-08-24 PROCEDURE — 80048 BASIC METABOLIC PNL TOTAL CA: CPT | Performed by: NURSE PRACTITIONER

## 2020-08-24 PROCEDURE — 82043 UR ALBUMIN QUANTITATIVE: CPT | Performed by: NURSE PRACTITIONER

## 2020-08-24 PROCEDURE — G0103 PSA SCREENING: HCPCS | Performed by: NURSE PRACTITIONER

## 2020-08-24 RX ORDER — ATORVASTATIN CALCIUM 20 MG/1
20 TABLET, FILM COATED ORAL DAILY
Refills: 0 | Status: CANCELLED | OUTPATIENT
Start: 2020-08-24

## 2020-08-24 RX ORDER — ATORVASTATIN CALCIUM 20 MG/1
20 TABLET, FILM COATED ORAL DAILY
Qty: 90 TABLET | Refills: 3 | Status: SHIPPED | OUTPATIENT
Start: 2020-08-24 | End: 2021-09-09

## 2020-08-24 ASSESSMENT — MIFFLIN-ST. JEOR: SCORE: 1750.57

## 2020-08-24 NOTE — PROGRESS NOTES
"SUBJECTIVE:   Dominguez Pittman is a 70 year old male who presents for Preventive Visit.    Are you in the first 12 months of your Medicare Part B coverage?  No    Physical Health:    In general, how would you rate your overall physical health? good    Outside of work, how many days during the week do you exercise? 2-3 days/week    Outside of work, approximately how many minutes a day do you exercise?15-30 minutes  If you drink alcohol do you typically have >3 drinks per day or >7 drinks per week? Yes - AUDIT SCORE:     No flowsheet data found.    Do you usually eat at least 4 servings of fruit and vegetables a day, include whole grains & fiber and avoid regularly eating high fat or \"junk\" foods? Yes    Do you have any problems taking medications regularly?  No    Do you have any side effects from medications? none    Needs assistance for the following daily activities: no assistance needed    Which of the following safety concerns are present in your home?  none identified     Hearing impairment: Yes, from the flying.     In the past 6 months, have you been bothered by leaking of urine? no    Mental Health:    In general, how would you rate your overall mental or emotional health? good  PHQ-2 Score: 0    Do you feel safe in your environment? Yes    Have you ever done Advance Care Planning? (For example, a Health Directive, POLST, or a discussion with a medical provider or your loved ones about your wishes): Yes, advance care planning is on file.    Additional concerns to address?  No    Fall risk:  Fallen 2 or more times in the past year?: No  Any fall with injury in the past year?: No    Cognitive Screenin) Repeat 3 items (Leader, Season, Table)    2) Clock draw: NORMAL  3) 3 item recall: Recalls 3 objects   Results: Recall 3 items.  Mini-CogTM Copyright S Latasha. Licensed by the author for use in [facility]; reprinted with permission (jazzmine@.edu). All rights reserved.      Do you have " sleep apnea, excessive snoring or daytime drowsiness?: no      Reviewed and updated as needed this visit by clinical staff  Tobacco  Allergies  Meds  Problems  Med Hx  Surg Hx  Fam Hx  Soc Hx          Reviewed and updated as needed this visit by Provider  Tobacco  Allergies  Meds  Problems  Med Hx  Surg Hx  Fam Hx        Social History     Tobacco Use     Smoking status: Former Smoker     Packs/day: 0.50     Years: 2.00     Pack years: 1.00     Types: Cigarettes     Last attempt to quit: 1973     Years since quittin.5     Smokeless tobacco: Never Used   Substance Use Topics     Alcohol use: Yes     Alcohol/week: 10.0 standard drinks     Comment: 3times/week                           Current providers sharing in care for this patient include:   Patient Care Team:  Brent Dietz MD as PCP - General (Family Practice)  Brent Dietz MD as Assigned PCP    The following health maintenance items are reviewed in Epic and correct as of today:  Health Maintenance   Topic Date Due     HEPATITIS C SCREENING  1950     MEDICARE ANNUAL WELLNESS VISIT  2016     FALL RISK ASSESSMENT  2016     PNEUMOCOCCAL IMMUNIZATION 65+ LOW/MEDIUM RISK (1 of 2 - PCV13) 2016     AORTIC ANEURYSM SCREENING (SYSTEM ASSIGNED)  2016     LIPID  2019     PHQ-2  2020     INFLUENZA VACCINE (1) 2020     ADVANCE CARE PLANNING  2021     DTAP/TDAP/TD IMMUNIZATION (3 - Td) 2024     COLORECTAL CANCER SCREENING  2027     ZOSTER IMMUNIZATION  Completed     IPV IMMUNIZATION  Aged Out     MENINGITIS IMMUNIZATION  Aged Out     HEPATITIS B IMMUNIZATION  Aged Out       ROS:  CONSTITUTIONAL: NEGATIVE for fever, chills, change in weight  INTEGUMENTARY/SKIN: NEGATIVE for worrisome rashes, moles or lesions  EYES: NEGATIVE for vision changes or irritation  ENT/MOUTH: NEGATIVE for ear, mouth and throat problems  RESP: NEGATIVE for significant cough or SOB  BREAST: NEGATIVE for masses,  "tenderness or discharge  CV: NEGATIVE for chest pain, palpitations or peripheral edema  GI: NEGATIVE for nausea, abdominal pain, heartburn, or change in bowel habits  : NEGATIVE for frequency, dysuria, or hematuria  MUSCULOSKELETAL: NEGATIVE for significant arthralgias or myalgia  NEURO: NEGATIVE for weakness, dizziness or paresthesias  ENDOCRINE: NEGATIVE for temperature intolerance, skin/hair changes  HEME: NEGATIVE for bleeding problems  PSYCHIATRIC: NEGATIVE for changes in mood or affect    OBJECTIVE:   /70 (BP Location: Right arm, Patient Position: Sitting, Cuff Size: Adult Large)   Pulse 87   Temp 96.7  F (35.9  C) (Tympanic)   Ht 1.803 m (5' 11\")   Wt 96.3 kg (212 lb 6.4 oz)   SpO2 98%   BMI 29.62 kg/m   Estimated body mass index is 29.62 kg/m  as calculated from the following:    Height as of this encounter: 1.803 m (5' 11\").    Weight as of this encounter: 96.3 kg (212 lb 6.4 oz).  EXAM:   GENERAL: healthy, alert and no distress  EYES: Eyes grossly normal to inspection, PERRL and conjunctivae and sclerae normal  HENT: ear canals and TM's normal, nose and mouth without ulcers or lesions  NECK: no adenopathy, no asymmetry, masses, or scars and thyroid normal to palpation  RESP: lungs clear to auscultation - no rales, rhonchi or wheezes  CV: regular rate and rhythm, normal S1 S2, no S3 or S4, no murmur, click or rub, no peripheral edema and peripheral pulses strong  ABDOMEN: soft, nontender, no hepatosplenomegaly, no masses and bowel sounds normal  MS: no gross musculoskeletal defects noted, no edema  SKIN: no suspicious lesions or rashes  NEURO: Normal strength and tone, mentation intact and speech normal  PSYCH: mentation appears normal, affect normal/bright    Diagnostic Test Results:  Labs reviewed in Epic  Results for orders placed or performed in visit on 08/24/20 (from the past 24 hour(s))   CBC with platelets and differential   Result Value Ref Range    WBC 5.8 4.0 - 11.0 10e9/L    RBC " "Count 3.95 (L) 4.4 - 5.9 10e12/L    Hemoglobin 12.1 (L) 13.3 - 17.7 g/dL    Hematocrit 35.6 (L) 40.0 - 53.0 %    MCV 90 78 - 100 fl    MCH 30.6 26.5 - 33.0 pg    MCHC 34.0 31.5 - 36.5 g/dL    RDW 13.6 10.0 - 15.0 %    Platelet Count 273 150 - 450 10e9/L    Diff Method Automated Method     % Neutrophils 61.9 %    % Lymphocytes 27.3 %    % Monocytes 8.4 %    % Eosinophils 1.9 %    % Basophils 0.5 %    Absolute Neutrophil 3.6 1.6 - 8.3 10e9/L    Absolute Lymphocytes 1.6 0.8 - 5.3 10e9/L    Absolute Monocytes 0.5 0.0 - 1.3 10e9/L    Absolute Eosinophils 0.1 0.0 - 0.7 10e9/L    Absolute Basophils 0.0 0.0 - 0.2 10e9/L       ASSESSMENT / PLAN:   Dominguez was seen today for physical.    Diagnoses and all orders for this visit:    Encounter for routine adult health examination without abnormal findings    Encounter for Medicare annual wellness exam    Essential hypertension  -     Albumin Random Urine Quantitative with Creat Ratio  -     Basic metabolic panel    Hyperlipidemia LDL goal <130  -     Lipid panel reflex to direct LDL Fasting    Screening for diabetes mellitus  -     Basic metabolic panel    Screening for prostate cancer  -     Prostate spec antigen screen    Screening for deficiency anemia  Comment: Has had chronically-low HGB and RBCs. No formal workup (colonoscopy was normal). No other clues. Will check iron studies today. If normal, may consider hematology referral.   -     CBC with platelets and differential  -     Ferritin  -     Iron and iron binding capacity        COUNSELING:  Reviewed preventive health counseling, as reflected in patient instructions    Estimated body mass index is 29.62 kg/m  as calculated from the following:    Height as of this encounter: 1.803 m (5' 11\").    Weight as of this encounter: 96.3 kg (212 lb 6.4 oz).         reports that he quit smoking about 47 years ago. His smoking use included cigarettes. He has a 1.00 pack-year smoking history. He has never used smokeless " tobacco.      Appropriate preventive services were discussed with this patient, including applicable screening as appropriate for cardiovascular disease, diabetes, osteopenia/osteoporosis, and glaucoma.  As appropriate for age/gender, discussed screening for colorectal cancer, prostate cancer, breast cancer, and cervical cancer. Checklist reviewing preventive services available has been given to the patient.    Reviewed patients plan of care and provided an AVS. The Basic Care Plan (routine screening as documented in Health Maintenance) for Dominguez meets the Care Plan requirement. This Care Plan has been established and reviewed with the Patient.    Counseling Resources:  ATP IV Guidelines  Pooled Cohorts Equation Calculator  Breast Cancer Risk Calculator  FRAX Risk Assessment  ICSI Preventive Guidelines  Dietary Guidelines for Americans, 2010  USDA's MyPlate  ASA Prophylaxis  Lung CA Screening    Tavon Damian NP  [facility]

## 2020-08-24 NOTE — PATIENT INSTRUCTIONS
Patient Education   Personalized Prevention Plan  You are due for the preventive services outlined below.  Your care team is available to assist you in scheduling these services.  If you have already completed any of these items, please share that information with your care team to update in your medical record.  Health Maintenance Due   Topic Date Due     Hepatitis C Screening  1950     Annual Wellness Visit  06/08/2016     FALL RISK ASSESSMENT  06/08/2016     Pneumococcal Vaccine (1 of 2 - PCV13) 06/08/2016     AORTIC ANEURYSM SCREENING (SYSTEM ASSIGNED)  06/08/2016     Cholesterol Lab  05/01/2019     PHQ-2  01/01/2020

## 2020-08-25 LAB
CREAT UR-MCNC: 75 MG/DL
MICROALBUMIN UR-MCNC: <5 MG/L
MICROALBUMIN/CREAT UR: NORMAL MG/G CR (ref 0–17)

## 2020-09-01 ENCOUNTER — OFFICE VISIT (OUTPATIENT)
Dept: FAMILY MEDICINE | Facility: CLINIC | Age: 70
End: 2020-09-01
Payer: COMMERCIAL

## 2020-09-01 VITALS
HEIGHT: 71 IN | TEMPERATURE: 98.5 F | OXYGEN SATURATION: 97 % | DIASTOLIC BLOOD PRESSURE: 80 MMHG | SYSTOLIC BLOOD PRESSURE: 131 MMHG | WEIGHT: 213 LBS | BODY MASS INDEX: 29.82 KG/M2 | HEART RATE: 80 BPM

## 2020-09-01 DIAGNOSIS — Z23 NEED FOR PROPHYLACTIC VACCINATION AND INOCULATION AGAINST INFLUENZA: ICD-10-CM

## 2020-09-01 DIAGNOSIS — Z01.818 PREOP GENERAL PHYSICAL EXAM: Primary | ICD-10-CM

## 2020-09-01 PROCEDURE — 90662 IIV NO PRSV INCREASED AG IM: CPT | Performed by: FAMILY MEDICINE

## 2020-09-01 PROCEDURE — G0008 ADMIN INFLUENZA VIRUS VAC: HCPCS | Performed by: FAMILY MEDICINE

## 2020-09-01 PROCEDURE — 99214 OFFICE O/P EST MOD 30 MIN: CPT | Mod: 25 | Performed by: FAMILY MEDICINE

## 2020-09-01 ASSESSMENT — MIFFLIN-ST. JEOR: SCORE: 1753.29

## 2020-09-01 NOTE — PATIENT INSTRUCTIONS

## 2020-09-01 NOTE — PROGRESS NOTES
[facility] [place]  [address] [address]  [phone]  Dept: [phone]    PRE-OP EVALUATION:  Today's date: 2020    Dominguez Pittman (: 1950) presents for pre-operative evaluation assessment as requested by Dr. Adarsh Escamilla  He requires evaluation and anesthesia risk assessment prior to undergoing surgery/procedure for treatment of cataracts  .    Fax number for surgical facility: [phone]  Primary Physician: Brent Dietz  Type of Anesthesia Anticipated: to be determined    Preop Questionnnaire:  Pre-op Questionnaire 2020   Surgery Location: [facility]   Surgeon: Dr Adarsh Escamilla   Surgery/Procedure: Cataract Left Eye   Surgery Date: 2020   Time of Surgery: 12:30 pm   Where patient plans to recover: At home with family   1. Have you ever had a heart attack or stroke? No   2. Have you ever had surgery on your heart or blood vessels, such as a stent placement, a coronary artery bypass, or surgery on an artery in your head, neck, heart, or legs? No   3. Do you have chest pain with activity? No   4. Do you have a history of  heart failure? No   5. Do you currently have a cold, bronchitis or symptoms of other infection? No   6. Do you have a cough, shortness of breath, or wheezing? No   7. Do you or anyone in your family have previous history of blood clots? No   8. Do you or does anyone in your family have a serious bleeding problem such as prolonged bleeding following surgeries or cuts? No   9. Have you ever had problems with anemia or been told to take iron pills? No   10. Have you had any abnormal blood loss such as black, tarry or bloody stools? No   11. Have you ever had a blood transfusion? No   Are you willing to have a blood transfusion if it is medically needed before, during, or after your surgery? Yes   13. Have you or any of your relatives ever had problems with anesthesia? No   14. Do you have sleep apnea, excessive  snoring or daytime drowsiness? No   15. Do you have any artifical heart valves or other implanted medical devices like a pacemaker, defibrillator, or continuous glucose monitor? No   16. Do you have artificial joints? YES -    17. Are you allergic to latex? No         HPI:     HPI related to upcoming procedure:       See problem list for active medical problems.  Problems all longstanding and stable, except as noted/documented.  See ROS for pertinent symptoms related to these conditions.      MEDICAL HISTORY:     Patient Active Problem List    Diagnosis Date Noted     PAC (premature atrial contraction) 05/01/2014     Priority: Medium     Left inguinal hernia 07/18/2013     Priority: Medium     Plantar warts 07/18/2013     Priority: Medium     Open wound of scalp 01/09/2013     Priority: Medium     Problem list name updated by automated process. Provider to review       Laceration 11/19/2012     Priority: Medium     Head injury 11/19/2012     Priority: Medium     Ganglion cyst 07/24/2012     Priority: Medium     Throat clearing 07/24/2012     Priority: Medium     Advance care planning 07/19/2011     Priority: Medium     Advance Care Planning 6/17/2016: Receipt of ACP document:  Received: Health Care Directive which was witnessed or notarized on 1/27/16.  Document previously scanned on 3/24/16.  Validation form completed and sent to be scanned.  Code Status needs to be updated to reflect choices in most recent ACP document. Confirmed/documented designated decision maker(s).  Added by Ines De León   Advance Care Planning Liaison  Advance Care Planning 7/19/11: Patient will bring in Copy       Erectile dysfunction 07/19/2011     Priority: Medium     HYPERLIPIDEMIA LDL GOAL <130 10/31/2010     Priority: Medium     Varicose veins of legs 06/28/2010     Priority: Medium     Arthritis 07/14/2008     Priority: Medium      Past Medical History:   Diagnosis Date     Arthritis      High cholesterol      Hx musculoskletl  dis NEC      Hypertension      Other nonspecific findings on examination of blood(790.99)      Personal history of other disorders of nervous system and sense organs      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      Past Surgical History:   Procedure Laterality Date     ARTHRODESIS WRIST Left 3/18/2016    Procedure: ARTHRODESIS WRIST;  Surgeon: Mayda Teresa MD;  Location: [facility]     ARTHRODESIS WRIST Left 12/21/2016    Procedure: ARTHRODESIS WRIST;  Surgeon: Mayda Teresa MD;  Location: [facility]     C NONSPECIFIC PROCEDURE      diskectomy L5s     COLONOSCOPY  7/27/2012    Procedure: COLONOSCOPY;  COLONOSCOPY;  Surgeon: Kris Garcia MD;  Location:  GI     COLONOSCOPY N/A 8/1/2017    Procedure: COLONOSCOPY;  COLONOSCOPY;  Surgeon: Emil Plaza MD;  Location:  GI     ENDOSCOPIC RELEASE CARPAL TUNNEL Left 3/18/2016    Procedure: ENDOSCOPIC RELEASE CARPAL TUNNEL;  Surgeon: Mayda Teresa MD;  Location: [facility]     JOINT REPLACEMTN, KNEE RT/LT  2007    Joint Replacement knee LT, right hip replacment      REMOVE HARDWARE WRIST Left 12/21/2016    Procedure: REMOVE HARDWARE WRIST;  Surgeon: Mayda Teresa MD;  Location: [facility]     SURGICAL HISTORY OF -       arthroscopyx 3     SURGICAL HISTORY OF -       lt cholesteotoma     Current Outpatient Medications   Medication Sig Dispense Refill     ADVIL 200 MG PO TABS prn        amitriptyline (ELAVIL) 25 MG tablet TAKE 1 TO 2 TABLETS BY MOUTH EVERY DAY AT BEDTIME  0     ASPIRIN PO Take 81 mg by mouth daily       atorvastatin (LIPITOR) 20 MG tablet Take 1 tablet (20 mg) by mouth daily 90 tablet 3     fish oil-omega-3 fatty acids 1000 MG capsule Take 2 capsules by mouth daily. 180 capsule 12     GLUCOSAMINE SULFATE PO Take 1,500 mg by mouth daily.         lisinopril-hydrochlorothiazide (PRINZIDE/ZESTORETIC) 10-12.5 MG tablet Take 1 tablet by mouth daily  3     MULTI-VITAMIN OR TABS  "1 q day   0     TYLENOL 325 MG PO TABS prn        VITAMIN D, CHOLECALCIFEROL, PO Take by mouth daily       OTC products: None, except as noted above    Allergies   Allergen Reactions     Penicillins Hives      Latex Allergy: NO    Social History     Tobacco Use     Smoking status: Former Smoker     Packs/day: 0.50     Years: 2.00     Pack years: 1.00     Types: Cigarettes     Last attempt to quit: 1973     Years since quittin.6     Smokeless tobacco: Never Used   Substance Use Topics     Alcohol use: Yes     Alcohol/week: 10.0 standard drinks     Comment: 3times/week     History   Drug Use No       REVIEW OF SYSTEMS:   CONSTITUTIONAL: NEGATIVE for fever, chills, change in weight  ENT/MOUTH: NEGATIVE for ear, mouth and throat problems  RESP: NEGATIVE for significant cough or SOB  CV: NEGATIVE for chest pain, palpitations or peripheral edema    EXAM:   /80   Pulse 80   Temp 98.5  F (36.9  C) (Tympanic)   Ht 1.803 m (5' 11\")   Wt 96.6 kg (213 lb)   SpO2 97%   BMI 29.71 kg/m    GENERAL APPEARANCE: healthy, alert and no distress  HENT: ear canals and TM's normal and nose and mouth without ulcers or lesions  RESP: lungs clear to auscultation - no rales, rhonchi or wheezes  CV: regular rate and rhythm, normal S1 S2, no S3 or S4 and no murmur, click or rub   ABDOMEN: soft, nontender, no HSM or masses and bowel sounds normal  NEURO: Normal strength and tone, sensory exam grossly normal, mentation intact and speech normal    DIAGNOSTICS:   No labs or EKG required for low risk surgery (cataract, skin procedure, breast biopsy, etc)    Recent Labs   Lab Test 20  1005 10/25/19  0944   HGB 12.1* 12.4*    301    134   POTASSIUM 4.7 4.2   CR 0.85 0.88        IMPRESSION:   Reason for surgery/procedure: cataracts on left     The proposed surgical procedure is considered LOW risk.    REVISED CARDIAC RISK INDEX  The patient has the following serious cardiovascular risks for perioperative " complications such as (MI, PE, VFib and 3  AV Block):  No serious cardiac risks  INTERPRETATION: 0 risks: Class I (very low risk - 0.4% complication rate)    The patient has the following additional risks for perioperative complications:  The 10-year ASCVD risk score (Yoav MAURICIO Jr., et al., 2013) is: 19.4%    Values used to calculate the score:      Age: 70 years      Sex: Male      Is Non- : No      Diabetic: No      Tobacco smoker: No      Systolic Blood Pressure: 131 mmHg      Is BP treated: Yes      HDL Cholesterol: 56 mg/dL      Total Cholesterol: 203 mg/dL      ICD-10-CM    1. Preop general physical exam  Z01.818    2. Need for prophylactic vaccination and inoculation against influenza  Z23 FLUZONE HIGH DOSE 65+  [17417]     Vaccine Administration, Initial [39026]       RECOMMENDATIONS:     --Patient is to take all scheduled medications on the day of surgery EXCEPT for modifications listed below.    Anticoagulant or Antiplatelet Medication Use  ASPIRIN: Discontinue ASA 7-10 days prior to procedure to reduce bleeding risk.  It should be resumed post-operatively.        ACE Inhibitor or Angiotensin Receptor Blocker (ARB) Use  Ace inhibitor or Angiotensin Receptor Blocker (ARB) and will continue this medication due to the higher risk of uncontrolled perioperative hypertension (e.g. neurosurgical procedure)      APPROVAL GIVEN to proceed with proposed procedure, without further diagnostic evaluation       Signed Electronically by: Brent Dietz MD    Copy of this evaluation report is provided to requesting physician.    [facility] Preop Guidelines    Revised Cardiac Risk Index

## 2020-12-07 ENCOUNTER — OFFICE VISIT (OUTPATIENT)
Dept: FAMILY MEDICINE | Facility: CLINIC | Age: 70
End: 2020-12-07
Payer: COMMERCIAL

## 2020-12-07 VITALS
DIASTOLIC BLOOD PRESSURE: 84 MMHG | WEIGHT: 218 LBS | SYSTOLIC BLOOD PRESSURE: 142 MMHG | HEIGHT: 71 IN | OXYGEN SATURATION: 97 % | BODY MASS INDEX: 30.52 KG/M2 | TEMPERATURE: 96.3 F | HEART RATE: 83 BPM

## 2020-12-07 DIAGNOSIS — Z01.818 PREOP GENERAL PHYSICAL EXAM: Primary | ICD-10-CM

## 2020-12-07 LAB — HGB BLD-MCNC: 11.4 G/DL (ref 13.3–17.7)

## 2020-12-07 PROCEDURE — 99214 OFFICE O/P EST MOD 30 MIN: CPT | Performed by: FAMILY MEDICINE

## 2020-12-07 PROCEDURE — 80069 RENAL FUNCTION PANEL: CPT | Performed by: FAMILY MEDICINE

## 2020-12-07 PROCEDURE — 93000 ELECTROCARDIOGRAM COMPLETE: CPT | Performed by: FAMILY MEDICINE

## 2020-12-07 PROCEDURE — 36415 COLL VENOUS BLD VENIPUNCTURE: CPT | Performed by: FAMILY MEDICINE

## 2020-12-07 PROCEDURE — 85018 HEMOGLOBIN: CPT | Performed by: FAMILY MEDICINE

## 2020-12-07 ASSESSMENT — MIFFLIN-ST. JEOR: SCORE: 1775.97

## 2020-12-07 NOTE — PROGRESS NOTES
[address] [address]  Phone: [phone]  Primary Provider: Brent Dietz      PREOPERATIVE EVALUATION:  Today's date: 12/7/2020    Dominguez Pittman is a 70 year old male who presents for a preoperative evaluation.    Surgical Information:  Surgery/Procedure: Right Rotator Cuff Repair  Surgery Location: [facility]  Surgeon: Lisandro Almonte  Surgery Date: 12/11/2020  Time of Surgery: TBD   Where patient plans to recover: At home with family  Fax number for surgical facility: [phone]    Type of Anesthesia Anticipated: General    Subjective         Preop Questions 12/7/2020   1. Have you ever had a heart attack or stroke? No   2. Have you ever had surgery on your heart or blood vessels, such as a stent placement, a coronary artery bypass, or surgery on an artery in your head, neck, heart, or legs? No   3. Do you have chest pain with activity? No   4. Do you have a history of  heart failure? No   5. Do you currently have a cold, bronchitis or symptoms of other infection? No   6. Do you have a cough, shortness of breath, or wheezing? No   7. Do you or anyone in your family have previous history of blood clots? No   8. Do you or does anyone in your family have a serious bleeding problem such as prolonged bleeding following surgeries or cuts? No   9. Have you ever had problems with anemia or been told to take iron pills? No   10. Have you had any abnormal blood loss such as black, tarry or bloody stools? No   11. Have you ever had a blood transfusion? No   12. Are you willing to have a blood transfusion if it is medically needed before, during, or after your surgery? NO    13. Have you or any of your relatives ever had problems with anesthesia? No   14. Do you have sleep apnea, excessive snoring or daytime drowsiness? No   15. Do you have any artifical heart valves or other implanted medical devices like a pacemaker, defibrillator,  or continuous glucose monitor? No   16. Do you have artificial joints? YES    17. Are you allergic to latex? No       Health Care Directive:  Patient has a Health Care Directive on file      Preoperative Review of :   reviewed - no record of controlled substances prescribed.      Review of Systems  CONSTITUTIONAL: NEGATIVE for fever, chills, change in weight  ENT/MOUTH: NEGATIVE for ear, mouth and throat problems  RESP: NEGATIVE for significant cough or SOB  CV: NEGATIVE for chest pain, palpitations or peripheral edema    Patient Active Problem List    Diagnosis Date Noted     PAC (premature atrial contraction) 05/01/2014     Priority: Medium     Left inguinal hernia 07/18/2013     Priority: Medium     Plantar warts 07/18/2013     Priority: Medium     Open wound of scalp 01/09/2013     Priority: Medium     Problem list name updated by automated process. Provider to review       Laceration 11/19/2012     Priority: Medium     Head injury 11/19/2012     Priority: Medium     Ganglion cyst 07/24/2012     Priority: Medium     Throat clearing 07/24/2012     Priority: Medium     Advance care planning 07/19/2011     Priority: Medium     Advance Care Planning 6/17/2016: Receipt of ACP document:  Received: Health Care Directive which was witnessed or notarized on 1/27/16.  Document previously scanned on 3/24/16.  Validation form completed and sent to be scanned.  Code Status needs to be updated to reflect choices in most recent ACP document. Confirmed/documented designated decision maker(s).  Added by Ines De León   Advance Care Planning Liaison  Advance Care Planning 7/19/11: Patient will bring in Copy       Erectile dysfunction 07/19/2011     Priority: Medium     HYPERLIPIDEMIA LDL GOAL <130 10/31/2010     Priority: Medium     Varicose veins of legs 06/28/2010     Priority: Medium     Arthritis 07/14/2008     Priority: Medium      Past Medical History:   Diagnosis Date     Arthritis      High cholesterol      Hx  musculoskletl dis NEC      Hypertension      Other nonspecific findings on examination of blood(790.99)      Personal history of other disorders of nervous system and sense organs      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      Past Surgical History:   Procedure Laterality Date     ARTHRODESIS WRIST Left 3/18/2016    Procedure: ARTHRODESIS WRIST;  Surgeon: Mayda Teresa MD;  Location: [facility]     ARTHRODESIS WRIST Left 12/21/2016    Procedure: ARTHRODESIS WRIST;  Surgeon: Mayda Teresa MD;  Location: [facility]     COLONOSCOPY  7/27/2012    Procedure: COLONOSCOPY;  COLONOSCOPY;  Surgeon: Kris Garcia MD;  Location:  GI     COLONOSCOPY N/A 8/1/2017    Procedure: COLONOSCOPY;  COLONOSCOPY;  Surgeon: Emil Plaza MD;  Location:  GI     ENDOSCOPIC RELEASE CARPAL TUNNEL Left 3/18/2016    Procedure: ENDOSCOPIC RELEASE CARPAL TUNNEL;  Surgeon: Mayda Teresa MD;  Location: [facility]     JOINT REPLACEMTN, KNEE RT/LT  2007    Joint Replacement knee LT, right hip replacment      REMOVE HARDWARE WRIST Left 12/21/2016    Procedure: REMOVE HARDWARE WRIST;  Surgeon: Mayda Teresa MD;  Location: [facility]     SURGICAL HISTORY OF -       arthroscopyx 3     SURGICAL HISTORY OF -       lt cholesteotoma     ZZC NONSPECIFIC PROCEDURE      diskectomy L5s     Current Outpatient Medications   Medication Sig Dispense Refill     ADVIL 200 MG PO TABS prn        amitriptyline (ELAVIL) 25 MG tablet TAKE 1 TO 2 TABLETS BY MOUTH EVERY DAY AT BEDTIME  0     ASPIRIN PO Take 81 mg by mouth daily       atorvastatin (LIPITOR) 20 MG tablet Take 1 tablet (20 mg) by mouth daily 90 tablet 3     fish oil-omega-3 fatty acids 1000 MG capsule Take 2 capsules by mouth daily. 180 capsule 12     GLUCOSAMINE SULFATE PO Take 1,500 mg by mouth daily.         lisinopril-hydrochlorothiazide (PRINZIDE/ZESTORETIC) 10-12.5 MG tablet Take 1 tablet by mouth daily  3      "MULTI-VITAMIN OR TABS 1 q day   0     TYLENOL 325 MG PO TABS prn        VITAMIN D, CHOLECALCIFEROL, PO Take by mouth daily         Allergies   Allergen Reactions     Penicillins Hives        Social History     Tobacco Use     Smoking status: Former Smoker     Packs/day: 0.50     Years: 2.00     Pack years: 1.00     Types: Cigarettes     Quit date: 1973     Years since quittin.8     Smokeless tobacco: Never Used   Substance Use Topics     Alcohol use: Yes     Alcohol/week: 10.0 standard drinks     Comment: 3times/week     Family History   Problem Relation Age of Onset     C.A.D. Paternal Grandfather         MI     History   Drug Use No         Objective     BP (!) 142/84   Pulse 83   Temp 96.3  F (35.7  C) (Tympanic)   Ht 1.803 m (5' 11\")   Wt 98.9 kg (218 lb)   SpO2 97%   BMI 30.40 kg/m      Physical Exam  GENERAL APPEARANCE: healthy, alert and no distress  HENT: ear canals and TM's normal and nose and mouth without ulcers or lesions  RESP: lungs clear to auscultation - no rales, rhonchi or wheezes  CV: regular rate and rhythm, normal S1 S2, no S3 or S4 and no murmur, click or rub   ABDOMEN: soft, nontender, no HSM or masses and bowel sounds normal  NEURO: Normal strength and tone, sensory exam grossly normal, mentation intact and speech normal    Recent Labs   Lab Test 20  1005 10/25/19  0944   HGB 12.1* 12.4*    301    134   POTASSIUM 4.7 4.2   CR 0.85 0.88        Diagnostics:  Recent Results (from the past 48 hour(s))   Hemoglobin    Collection Time: 20 11:02 AM   Result Value Ref Range    Hemoglobin 11.4 (L) 13.3 - 17.7 g/dL   Renal panel (Alb, BUN, Ca, Cl, CO2, Creat, Gluc, Phos, K, Na)    Collection Time: 20 11:02 AM   Result Value Ref Range    Sodium 138 133 - 144 mmol/L    Potassium 4.8 3.4 - 5.3 mmol/L    Chloride 108 94 - 109 mmol/L    Carbon Dioxide 26 20 - 32 mmol/L    Anion Gap 4 3 - 14 mmol/L    Glucose 99 70 - 99 mg/dL    Urea Nitrogen 19 7 - 30 mg/dL    " Creatinine 0.84 0.66 - 1.25 mg/dL    GFR Estimate 89 >60 mL/min/[1.73_m2]    GFR Estimate If Black >90 >60 mL/min/[1.73_m2]    Calcium 8.9 8.5 - 10.1 mg/dL    Phosphorus 3.8 2.5 - 4.5 mg/dL    Albumin 3.9 3.4 - 5.0 g/dL      EKG: appears normal, NSR, normal axis, normal intervals, no acute ST/T changes c/w ischemia, no LVH by voltage criteria, unchanged from previous tracings    Revised Cardiac Risk Index (RCRI):  The patient has the following serious cardiovascular risks for perioperative complications:   - No serious cardiac risks = 0 points     RCRI Interpretation: 0 points: Class I (very low risk - 0.4% complication rate)         Assessment & Plan   The proposed surgical procedure is considered LOW risk.    Preop general physical exam    - EKG 12-lead complete w/read - Clinics  - Hemoglobin  - Renal panel (Alb, BUN, Ca, Cl, CO2, Creat, Gluc, Phos, K, Na)           Medication Instructions:   - aspirin: Discontinue aspirin 7-10 days prior to procedure to reduce bleeding risk. It should be resumed postoperatively.    - ibuprofen (Advil, Motrin): HOLD 1 day before surgery.     RECOMMENDATION:  APPROVAL GIVEN to proceed with proposed procedure, without further diagnostic evaluation.    Signed Electronically by: Brent Dietz MD    Copy of this evaluation report is provided to requesting physician.    Preop [facility] Preop Guidelines    Revised Cardiac Risk Index

## 2020-12-07 NOTE — PATIENT INSTRUCTIONS

## 2020-12-08 ENCOUNTER — TELEPHONE (OUTPATIENT)
Dept: FAMILY MEDICINE | Facility: CLINIC | Age: 70
End: 2020-12-08

## 2020-12-08 LAB
ALBUMIN SERPL-MCNC: 3.9 G/DL (ref 3.4–5)
ANION GAP SERPL CALCULATED.3IONS-SCNC: 4 MMOL/L (ref 3–14)
BUN SERPL-MCNC: 19 MG/DL (ref 7–30)
CALCIUM SERPL-MCNC: 8.9 MG/DL (ref 8.5–10.1)
CHLORIDE SERPL-SCNC: 108 MMOL/L (ref 94–109)
CO2 SERPL-SCNC: 26 MMOL/L (ref 20–32)
CREAT SERPL-MCNC: 0.84 MG/DL (ref 0.66–1.25)
GFR SERPL CREATININE-BSD FRML MDRD: 89 ML/MIN/{1.73_M2}
GLUCOSE SERPL-MCNC: 99 MG/DL (ref 70–99)
PHOSPHATE SERPL-MCNC: 3.8 MG/DL (ref 2.5–4.5)
POTASSIUM SERPL-SCNC: 4.8 MMOL/L (ref 3.4–5.3)
SODIUM SERPL-SCNC: 138 MMOL/L (ref 133–144)

## 2020-12-08 NOTE — TELEPHONE ENCOUNTER
General Call:     Who is calling:  PT     Reason for Call:  Pt was in yesterday for a pre-op - with Dr. Dietz -  VAISHNA[facility] 12-11-20 (this Friday)-clinic called PT informing him that  they have not received to pre op paperwork yet - fax number was on the paperwork - Pt would like to have someone call him at the home number informing him that it was faxed again. To confirm that the clinic has received the pre-op paperwork after faxing again, you can call [phone]    You can also call PT wife julio @  [phone]    What are your questions or concerns:      Date of last appointment with provider:     Okay to leave a detailed message:Yes at Home number on file [phone] (home)

## 2021-02-12 ENCOUNTER — TELEPHONE (OUTPATIENT)
Dept: FAMILY MEDICINE | Facility: CLINIC | Age: 71
End: 2021-02-12

## 2021-02-12 NOTE — TELEPHONE ENCOUNTER
Call from patient re: COVID vaccine.     States he can get vaccine in [place]. Questioning if he can get second vaccine here. Advised to get same dose in same location since we cannot get him vaccine here since he is not 75+. He verbalizes understanding.

## 2021-03-11 ENCOUNTER — OFFICE VISIT (OUTPATIENT)
Dept: FAMILY MEDICINE | Facility: CLINIC | Age: 71
End: 2021-03-11
Payer: COMMERCIAL

## 2021-03-11 VITALS
HEART RATE: 77 BPM | OXYGEN SATURATION: 98 % | BODY MASS INDEX: 30.52 KG/M2 | HEIGHT: 71 IN | WEIGHT: 218 LBS | DIASTOLIC BLOOD PRESSURE: 78 MMHG | SYSTOLIC BLOOD PRESSURE: 132 MMHG | TEMPERATURE: 97.9 F

## 2021-03-11 DIAGNOSIS — H73.90 ABNORMAL TYMPANIC MEMBRANE, UNSPECIFIED LATERALITY: ICD-10-CM

## 2021-03-11 DIAGNOSIS — Z01.818 PREOPERATIVE EXAMINATION: Primary | ICD-10-CM

## 2021-03-11 LAB — HGB BLD-MCNC: 11.7 G/DL (ref 13.3–17.7)

## 2021-03-11 PROCEDURE — 80069 RENAL FUNCTION PANEL: CPT | Performed by: FAMILY MEDICINE

## 2021-03-11 PROCEDURE — 85018 HEMOGLOBIN: CPT | Performed by: FAMILY MEDICINE

## 2021-03-11 PROCEDURE — 99214 OFFICE O/P EST MOD 30 MIN: CPT | Performed by: FAMILY MEDICINE

## 2021-03-11 PROCEDURE — 36415 COLL VENOUS BLD VENIPUNCTURE: CPT | Performed by: FAMILY MEDICINE

## 2021-03-11 ASSESSMENT — MIFFLIN-ST. JEOR: SCORE: 1775.97

## 2021-03-11 NOTE — PROGRESS NOTES
[address] [address]  Phone: [phone]  Primary Provider: Brent Dietz        PREOPERATIVE EVALUATION:  Today's date: 3/11/2021    Dominguez Pittman is a 70 year old male who presents for a preoperative evaluation.    Surgical Information:  Surgery/Procedure: Tympanoplasty  Surgery Location: [facility]  Surgeon: Dr. Macario Briceño  Surgery Date: 3/17/2021  Time of Surgery: 9:00 am  Where patient plans to recover: At home with family  Fax number for surgical facility: [phone]    Type of Anesthesia Anticipated: General    Assessment & Plan     The proposed surgical procedure is considered LOW risk.    Preoperative examination    - Hemoglobin  - Renal panel (Alb, BUN, Ca, Cl, CO2, Creat, Gluc, Phos, K, Na)        Risks and Recommendations:  The patient has the following additional risks and recommendations for perioperative complications:   - No identified additional risk factors other than previously addressed    Medication Instructions:   - aspirin: Discontinue aspirin 7-10 days prior to procedure to reduce bleeding risk. It should be resumed postoperatively.    - ibuprofen (Advil, Motrin): HOLD 1 day before surgery.     RECOMMENDATION:  APPROVAL GIVEN to proceed with proposed procedure, without further diagnostic evaluation.              Subjective         Preop Questions 12/7/2020   1. Have you ever had a heart attack or stroke? No   2. Have you ever had surgery on your heart or blood vessels, such as a stent placement, a coronary artery bypass, or surgery on an artery in your head, neck, heart, or legs? No   3. Do you have chest pain with activity? No   4. Do you have a history of  heart failure? No   5. Do you currently have a cold, bronchitis or symptoms of other infection? No   6. Do you have a cough, shortness of breath, or wheezing? No   7. Do you or anyone in your family have previous history of blood clots? No   8.  Do you or does anyone in your family have a serious bleeding problem such as prolonged bleeding following surgeries or cuts? No   9. Have you ever had problems with anemia or been told to take iron pills? No   10. Have you had any abnormal blood loss such as black, tarry or bloody stools? No   11. Have you ever had a blood transfusion? No   12. Are you willing to have a blood transfusion if it is medically needed before, during, or after your surgery? NO    13. Have you or any of your relatives ever had problems with anesthesia? No   14. Do you have sleep apnea, excessive snoring or daytime drowsiness? No   15. Do you have any artifical heart valves or other implanted medical devices like a pacemaker, defibrillator, or continuous glucose monitor? No   16. Do you have artificial joints? YES   17. Are you allergic to latex? No     Health Care Directive:  Patient has a Health Care Directive on file      Preoperative Review of :   reviewed - no record of controlled substances prescribed.          Review of Systems  CONSTITUTIONAL: NEGATIVE for fever, chills, change in weight  ENT/MOUTH: NEGATIVE for ear, mouth and throat problems  RESP: NEGATIVE for significant cough or SOB  CV: NEGATIVE for chest pain, palpitations or peripheral edema    Patient Active Problem List    Diagnosis Date Noted     PAC (premature atrial contraction) 05/01/2014     Priority: Medium     Left inguinal hernia 07/18/2013     Priority: Medium     Plantar warts 07/18/2013     Priority: Medium     Open wound of scalp 01/09/2013     Priority: Medium     Problem list name updated by automated process. Provider to review       Laceration 11/19/2012     Priority: Medium     Head injury 11/19/2012     Priority: Medium     Ganglion cyst 07/24/2012     Priority: Medium     Throat clearing 07/24/2012     Priority: Medium     Advance care planning 07/19/2011     Priority: Medium     Advance Care Planning 6/17/2016: Receipt of ACP document:  Received:  Health Care Directive which was witnessed or notarized on 1/27/16.  Document previously scanned on 3/24/16.  Validation form completed and sent to be scanned.  Code Status needs to be updated to reflect choices in most recent ACP document. Confirmed/documented designated decision maker(s).  Added by Ines De León   Advance Care Planning Liaison  Advance Care Planning 7/19/11: Patient will bring in Copy       Erectile dysfunction 07/19/2011     Priority: Medium     HYPERLIPIDEMIA LDL GOAL <130 10/31/2010     Priority: Medium     Varicose veins of legs 06/28/2010     Priority: Medium     Arthritis 07/14/2008     Priority: Medium      Past Medical History:   Diagnosis Date     Arthritis      High cholesterol      Hx musculoskletl dis NEC      Hypertension      Other nonspecific findings on examination of blood(790.99)      Personal history of other disorders of nervous system and sense organs      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      Past Surgical History:   Procedure Laterality Date     ARTHRODESIS WRIST Left 3/18/2016    Procedure: ARTHRODESIS WRIST;  Surgeon: Mayda Teresa MD;  Location: [facility]     ARTHRODESIS WRIST Left 12/21/2016    Procedure: ARTHRODESIS WRIST;  Surgeon: Mayda Teresa MD;  Location: [facility]     COLONOSCOPY  7/27/2012    Procedure: COLONOSCOPY;  COLONOSCOPY;  Surgeon: Kris Garcia MD;  Location:  GI     COLONOSCOPY N/A 8/1/2017    Procedure: COLONOSCOPY;  COLONOSCOPY;  Surgeon: Emil Plaza MD;  Location:  GI     ENDOSCOPIC RELEASE CARPAL TUNNEL Left 3/18/2016    Procedure: ENDOSCOPIC RELEASE CARPAL TUNNEL;  Surgeon: Mayda Teresa MD;  Location: [facility]     JOINT REPLACEMTN, KNEE RT/LT  2007    Joint Replacement knee LT, right hip replacment      REMOVE HARDWARE WRIST Left 12/21/2016    Procedure: REMOVE HARDWARE WRIST;  Surgeon: Mayda Teresa MD;  Location: [facility]     SURGICAL  HISTORY OF -       arthroscopyx 3     SURGICAL HISTORY OF -       lt cholesteotoma     ZZC NONSPECIFIC PROCEDURE      diskectomy L5s     Current Outpatient Medications   Medication Sig Dispense Refill     ADVIL 200 MG PO TABS prn        amitriptyline (ELAVIL) 25 MG tablet TAKE 1 TO 2 TABLETS BY MOUTH EVERY DAY AT BEDTIME  0     ASPIRIN PO Take 81 mg by mouth daily       atorvastatin (LIPITOR) 20 MG tablet Take 1 tablet (20 mg) by mouth daily 90 tablet 3     fish oil-omega-3 fatty acids 1000 MG capsule Take 2 capsules by mouth daily. 180 capsule 12     GLUCOSAMINE SULFATE PO Take 1,500 mg by mouth daily.         lisinopril-hydrochlorothiazide (PRINZIDE/ZESTORETIC) 10-12.5 MG tablet Take 1 tablet by mouth daily  3     MULTI-VITAMIN OR TABS 1 q day   0     TYLENOL 325 MG PO TABS prn        VITAMIN D, CHOLECALCIFEROL, PO Take by mouth daily         Allergies   Allergen Reactions     Penicillins Hives        Social History     Tobacco Use     Smoking status: Former Smoker     Packs/day: 0.50     Years: 2.00     Pack years: 1.00     Types: Cigarettes     Quit date: 1973     Years since quittin.1     Smokeless tobacco: Never Used   Substance Use Topics     Alcohol use: Yes     Alcohol/week: 10.0 standard drinks     Comment: 3times/week     Family History   Problem Relation Age of Onset     C.A.D. Paternal Grandfather         MI     History   Drug Use No         Objective     There were no vitals taken for this visit.    Physical Exam  GENERAL APPEARANCE: healthy, alert and no distress  HENT: ear canals and TM's normal and nose and mouth without ulcers or lesions  RESP: lungs clear to auscultation - no rales, rhonchi or wheezes  CV: regular rate and rhythm, normal S1 S2, no S3 or S4 and no murmur, click or rub   ABDOMEN: soft, nontender, no HSM or masses and bowel sounds normal  NEURO: Normal strength and tone, sensory exam grossly normal, mentation intact and speech normal    Recent Labs   Lab Test 20  1102  08/24/20  1005 10/25/19  0944   HGB 11.4* 12.1* 12.4*   PLT  --  273 301    135 134   POTASSIUM 4.8 4.7 4.2   CR 0.84 0.85 0.88        Diagnostics:  Recent Results (from the past 48 hour(s))   Hemoglobin    Collection Time: 03/11/21 11:16 AM   Result Value Ref Range    Hemoglobin 11.7 (L) 13.3 - 17.7 g/dL   Renal panel (Alb, BUN, Ca, Cl, CO2, Creat, Gluc, Phos, K, Na)    Collection Time: 03/11/21 11:16 AM   Result Value Ref Range    Sodium 135 133 - 144 mmol/L    Potassium 4.6 3.4 - 5.3 mmol/L    Chloride 105 94 - 109 mmol/L    Carbon Dioxide 29 20 - 32 mmol/L    Anion Gap 1 (L) 3 - 14 mmol/L    Glucose 98 70 - 99 mg/dL    Urea Nitrogen 15 7 - 30 mg/dL    Creatinine 0.81 0.66 - 1.25 mg/dL    GFR Estimate >90 >60 mL/min/[1.73_m2]    GFR Estimate If Black >90 >60 mL/min/[1.73_m2]    Calcium 8.8 8.5 - 10.1 mg/dL    Phosphorus 3.9 2.5 - 4.5 mg/dL    Albumin 3.9 3.4 - 5.0 g/dL      EKG: appears normal, NSR, normal axis, normal intervals, no acute ST/T changes c/w ischemia, no LVH by voltage criteria, unchanged from previous tracings    Revised Cardiac Risk Index (RCRI):  The patient has the following serious cardiovascular risks for perioperative complications:   - No serious cardiac risks = 0 points     RCRI Interpretation: 0 points: Class I (very low risk - 0.4% complication rate)           Signed Electronically by: Brent Dietz MD  Copy of this evaluation report is provided to requesting physician.

## 2021-03-12 LAB
ALBUMIN SERPL-MCNC: 3.9 G/DL (ref 3.4–5)
ANION GAP SERPL CALCULATED.3IONS-SCNC: 1 MMOL/L (ref 3–14)
BUN SERPL-MCNC: 15 MG/DL (ref 7–30)
CALCIUM SERPL-MCNC: 8.8 MG/DL (ref 8.5–10.1)
CHLORIDE SERPL-SCNC: 105 MMOL/L (ref 94–109)
CO2 SERPL-SCNC: 29 MMOL/L (ref 20–32)
CREAT SERPL-MCNC: 0.81 MG/DL (ref 0.66–1.25)
GFR SERPL CREATININE-BSD FRML MDRD: >90 ML/MIN/{1.73_M2}
GLUCOSE SERPL-MCNC: 98 MG/DL (ref 70–99)
PHOSPHATE SERPL-MCNC: 3.9 MG/DL (ref 2.5–4.5)
POTASSIUM SERPL-SCNC: 4.6 MMOL/L (ref 3.4–5.3)
SODIUM SERPL-SCNC: 135 MMOL/L (ref 133–144)

## 2021-06-11 DIAGNOSIS — I10 ESSENTIAL HYPERTENSION: Primary | ICD-10-CM

## 2021-06-11 NOTE — TELEPHONE ENCOUNTER
Medication Question or Refill    Who is calling: pt    What medication are you calling about (include dose and sig)?:  lisinopril-hydrochlorothiazide (PRINZIDE/ZESTORETIC) 10-12.5 MG tablet  Would like 90 pills- or more if possible     Controlled Substance Agreement on file:     Who prescribed the medication?: Cole     Do you need a refill? Yes: - PT is out of pills     When did you use the medication last? Yesterday     Patient offered an appointment? No    Do you have any questions or concerns?  No    Requested Pharmacy: [facility]    Okay to leave a detailed message?: Yes at Cell number on file:    Telephone Information:   Mobile [phone]

## 2021-06-15 RX ORDER — LISINOPRIL/HYDROCHLOROTHIAZIDE 10-12.5 MG
1 TABLET ORAL DAILY
Qty: 90 TABLET | Refills: 3 | Status: SHIPPED | OUTPATIENT
Start: 2021-06-15 | End: 2022-07-05

## 2021-06-15 NOTE — TELEPHONE ENCOUNTER
Routing refill request to provider for review/approval because:  Medication is reported/historical    Karena FARIAS RN  EP Triage

## 2021-07-19 ENCOUNTER — APPOINTMENT (OUTPATIENT)
Dept: URBAN - METROPOLITAN AREA CLINIC 255 | Age: 71
Setting detail: DERMATOLOGY
End: 2021-07-23

## 2021-07-19 DIAGNOSIS — L28.0 LICHEN SIMPLEX CHRONICUS: ICD-10-CM

## 2021-07-19 DIAGNOSIS — D22 MELANOCYTIC NEVI: ICD-10-CM

## 2021-07-19 DIAGNOSIS — D18.0 HEMANGIOMA: ICD-10-CM

## 2021-07-19 DIAGNOSIS — L81.8 OTHER SPECIFIED DISORDERS OF PIGMENTATION: ICD-10-CM

## 2021-07-19 DIAGNOSIS — L82.1 OTHER SEBORRHEIC KERATOSIS: ICD-10-CM

## 2021-07-19 DIAGNOSIS — L81.4 OTHER MELANIN HYPERPIGMENTATION: ICD-10-CM

## 2021-07-19 DIAGNOSIS — L57.0 ACTINIC KERATOSIS: ICD-10-CM

## 2021-07-19 PROBLEM — D18.01 HEMANGIOMA OF SKIN AND SUBCUTANEOUS TISSUE: Status: ACTIVE | Noted: 2021-07-19

## 2021-07-19 PROBLEM — D22.5 MELANOCYTIC NEVI OF TRUNK: Status: ACTIVE | Noted: 2021-07-19

## 2021-07-19 PROBLEM — D22.61 MELANOCYTIC NEVI OF RIGHT UPPER LIMB, INCLUDING SHOULDER: Status: ACTIVE | Noted: 2021-07-19

## 2021-07-19 PROCEDURE — OTHER PRESCRIPTION: OTHER

## 2021-07-19 PROCEDURE — 99214 OFFICE O/P EST MOD 30 MIN: CPT | Mod: 25

## 2021-07-19 PROCEDURE — OTHER COUNSELING: OTHER

## 2021-07-19 PROCEDURE — OTHER LIQUID NITROGEN: OTHER

## 2021-07-19 PROCEDURE — 17000 DESTRUCT PREMALG LESION: CPT

## 2021-07-19 PROCEDURE — OTHER MIPS QUALITY: OTHER

## 2021-07-19 RX ORDER — TRIAMCINOLONE ACETONIDE 1 MG/G
CREAM TOPICAL BID
Qty: 45 | Refills: 3 | Status: ERX | COMMUNITY
Start: 2021-07-19

## 2021-07-19 ASSESSMENT — LOCATION SIMPLE DESCRIPTION DERM
LOCATION SIMPLE: LEFT UPPER BACK
LOCATION SIMPLE: LEFT KNEE
LOCATION SIMPLE: LEFT FOREHEAD
LOCATION SIMPLE: RIGHT UPPER BACK
LOCATION SIMPLE: RIGHT UPPER ARM
LOCATION SIMPLE: RIGHT KNEE

## 2021-07-19 ASSESSMENT — LOCATION DETAILED DESCRIPTION DERM
LOCATION DETAILED: LEFT SUPERIOR MEDIAL UPPER BACK
LOCATION DETAILED: RIGHT SUPERIOR LATERAL UPPER BACK
LOCATION DETAILED: RIGHT LATERAL PROXIMAL UPPER ARM
LOCATION DETAILED: LEFT KNEE
LOCATION DETAILED: RIGHT MEDIAL UPPER BACK
LOCATION DETAILED: LEFT FOREHEAD
LOCATION DETAILED: RIGHT KNEE
LOCATION DETAILED: RIGHT INFERIOR MEDIAL UPPER BACK

## 2021-07-19 ASSESSMENT — LOCATION ZONE DERM
LOCATION ZONE: FACE
LOCATION ZONE: ARM
LOCATION ZONE: TRUNK
LOCATION ZONE: LEG

## 2021-07-19 NOTE — PROCEDURE: LIQUID NITROGEN
Render Note In Bullet Format When Appropriate: No
Post-Care Instructions: I reviewed with the patient in detail post-care instructions. Patient is to wear sunprotection, and avoid picking at any of the treated lesions. Pt may apply Vaseline to crusted or scabbing areas.
Detail Level: Detailed
Show Applicator Variable?: Yes
Number Of Freeze-Thaw Cycles: 1 freeze-thaw cycle
Consent: The patient's consent was obtained including but not limited to risks of crusting, scabbing, blistering, scarring, darker or lighter pigmentary change, recurrence, incomplete removal and infection.
Duration Of Freeze Thaw-Cycle (Seconds): 10

## 2021-08-31 ASSESSMENT — ENCOUNTER SYMPTOMS
ARTHRALGIAS: 1
MYALGIAS: 0
HEADACHES: 0
HEARTBURN: 0
HEMATOCHEZIA: 0
DYSURIA: 0
DIZZINESS: 0
PALPITATIONS: 0
NAUSEA: 0
ABDOMINAL PAIN: 0
PARESTHESIAS: 0
SORE THROAT: 0
COUGH: 0
DIARRHEA: 0
JOINT SWELLING: 0
FREQUENCY: 0
FEVER: 0
EYE PAIN: 0
HEMATURIA: 0
CONSTIPATION: 0
CHILLS: 0
NERVOUS/ANXIOUS: 0
SHORTNESS OF BREATH: 0
WEAKNESS: 0

## 2021-08-31 ASSESSMENT — ACTIVITIES OF DAILY LIVING (ADL): CURRENT_FUNCTION: NO ASSISTANCE NEEDED

## 2021-09-03 ENCOUNTER — OFFICE VISIT (OUTPATIENT)
Dept: FAMILY MEDICINE | Facility: CLINIC | Age: 71
End: 2021-09-03
Payer: COMMERCIAL

## 2021-09-03 VITALS
WEIGHT: 215 LBS | SYSTOLIC BLOOD PRESSURE: 136 MMHG | TEMPERATURE: 97.5 F | DIASTOLIC BLOOD PRESSURE: 72 MMHG | HEART RATE: 75 BPM | OXYGEN SATURATION: 96 % | BODY MASS INDEX: 30.78 KG/M2 | HEIGHT: 70 IN

## 2021-09-03 DIAGNOSIS — Z12.5 SCREENING FOR PROSTATE CANCER: Primary | ICD-10-CM

## 2021-09-03 DIAGNOSIS — Z00.00 ENCOUNTER FOR MEDICARE ANNUAL WELLNESS EXAM: ICD-10-CM

## 2021-09-03 LAB
ERYTHROCYTE [DISTWIDTH] IN BLOOD BY AUTOMATED COUNT: 13.7 % (ref 10–15)
HCT VFR BLD AUTO: 33.6 % (ref 40–53)
HGB BLD-MCNC: 11 G/DL (ref 13.3–17.7)
MCH RBC QN AUTO: 30.1 PG (ref 26.5–33)
MCHC RBC AUTO-ENTMCNC: 32.7 G/DL (ref 31.5–36.5)
MCV RBC AUTO: 92 FL (ref 78–100)
PLATELET # BLD AUTO: 256 10E3/UL (ref 150–450)
RBC # BLD AUTO: 3.65 10E6/UL (ref 4.4–5.9)
WBC # BLD AUTO: 5.3 10E3/UL (ref 4–11)

## 2021-09-03 PROCEDURE — 99397 PER PM REEVAL EST PAT 65+ YR: CPT | Performed by: FAMILY MEDICINE

## 2021-09-03 PROCEDURE — 80061 LIPID PANEL: CPT | Performed by: FAMILY MEDICINE

## 2021-09-03 PROCEDURE — 36415 COLL VENOUS BLD VENIPUNCTURE: CPT | Performed by: FAMILY MEDICINE

## 2021-09-03 PROCEDURE — 85027 COMPLETE CBC AUTOMATED: CPT | Performed by: FAMILY MEDICINE

## 2021-09-03 PROCEDURE — G0103 PSA SCREENING: HCPCS | Performed by: FAMILY MEDICINE

## 2021-09-03 PROCEDURE — 80053 COMPREHEN METABOLIC PANEL: CPT | Performed by: FAMILY MEDICINE

## 2021-09-03 ASSESSMENT — ENCOUNTER SYMPTOMS
FREQUENCY: 0
ARTHRALGIAS: 1
ABDOMINAL PAIN: 0
CONSTIPATION: 0
DYSURIA: 0
HEADACHES: 0
SORE THROAT: 0
JOINT SWELLING: 0
PALPITATIONS: 0
NERVOUS/ANXIOUS: 0
HEMATURIA: 0
PARESTHESIAS: 0
HEMATOCHEZIA: 0
DIARRHEA: 0
DIZZINESS: 0
HEARTBURN: 0
WEAKNESS: 0
EYE PAIN: 0
FEVER: 0
COUGH: 0
NAUSEA: 0
CHILLS: 0
MYALGIAS: 0
SHORTNESS OF BREATH: 0

## 2021-09-03 ASSESSMENT — ACTIVITIES OF DAILY LIVING (ADL): CURRENT_FUNCTION: NO ASSISTANCE NEEDED

## 2021-09-03 ASSESSMENT — MIFFLIN-ST. JEOR: SCORE: 1742.73

## 2021-09-03 NOTE — PATIENT INSTRUCTIONS
Patient Education   Personalized Prevention Plan  You are due for the preventive services outlined below.  Your care team is available to assist you in scheduling these services.  If you have already completed any of these items, please share that information with your care team to update in your medical record.  Health Maintenance Due   Topic Date Due     ANNUAL REVIEW OF HM ORDERS  Never done     Hepatitis C Screening  Never done     Pneumococcal Vaccine (1 of 1 - PPSV23) Never done     AORTIC ANEURYSM SCREENING (SYSTEM ASSIGNED)  Never done     FALL RISK ASSESSMENT  08/24/2021     Flu Vaccine (1) 09/01/2021     Annual Wellness Visit  08/24/2021

## 2021-09-03 NOTE — PROGRESS NOTES
"SUBJECTIVE:   Dominguez Pittman is a 71 year old male who presents for Preventive Visit.      Patient has been advised of split billing requirements and indicates understanding: Yes   Are you in the first 12 months of your Medicare coverage?  No    Healthy Habits:     In general, how would you rate your overall health?  Good    Frequency of exercise:  2-3 days/week    Duration of exercise:  30-45 minutes    Do you usually eat at least 4 servings of fruit and vegetables a day, include whole grains    & fiber and avoid regularly eating high fat or \"junk\" foods?  Yes    Taking medications regularly:  Yes    Medication side effects:  None    Ability to successfully perform activities of daily living:  No assistance needed    Home Safety:  No safety concerns identified    Hearing Impairment:  Need to ask people to speak up or repeat themselves and difficulty understanding soft or whispered speech    In the past 6 months, have you been bothered by leaking of urine?  No    In general, how would you rate your overall mental or emotional health?  Good      PHQ-2 Total Score: 0    Additional concerns today:  Yes    Do you feel safe in your environment? Yes    Have you ever done Advance Care Planning? (For example, a Health Directive, POLST, or a discussion with a medical provider or your loved ones about your wishes): Yes, advance care planning is on file.       Fall risk  Fallen 2 or more times in the past year?: No  Any fall with injury in the past year?: No    Cognitive Screening   1) Repeat 3 items (Leader, Season, Table)    2) Clock draw: NORMAL  3) 3 item recall: Recalls 2 objects   Results: NORMAL clock, 1-2 items recalled: COGNITIVE IMPAIRMENT LESS LIKELY    Mini-CogTM Copyright LAKHWINDER Pagan. Licensed by the author for use in [facility]; reprinted with permission (jazzmine@.[email or web address]). All rights reserved.      Do you have sleep apnea, excessive snoring or daytime drowsiness?: no    Reviewed and updated as needed " this visit by clinical staff                 Reviewed and updated as needed this visit by Provider                Social History     Tobacco Use     Smoking status: Former Smoker     Packs/day: 0.50     Years: 2.00     Pack years: 1.00     Types: Cigarettes     Start date: 1968     Quit date: 1972     Years since quittin.6     Smokeless tobacco: Never Used   Substance Use Topics     Alcohol use: Yes     Alcohol/week: 10.0 standard drinks     Comment: 3times/week     If you drink alcohol do you typically have >3 drinks per day or >7 drinks per week? Yes      Alcohol Use 2021   Prescreen: >3 drinks/day or >7 drinks/week? Yes   Prescreen: >3 drinks/day or >7 drinks/week? -   AUDIT SCORE  5         Current providers sharing in care for this patient include:   Patient Care Team:  Brent Dietz MD as PCP - General (Family Practice)  Brent Dietz MD as Assigned PCP    The following health maintenance items are reviewed in Epic and correct as of today:  Health Maintenance Due   Topic Date Due     ANNUAL REVIEW OF HM ORDERS  Never done     HEPATITIS C SCREENING  Never done     Pneumococcal Vaccine: 65+ Years (1 of 1 - PPSV23) Never done     AORTIC ANEURYSM SCREENING (SYSTEM ASSIGNED)  Never done     FALL RISK ASSESSMENT  2021     INFLUENZA VACCINE (1) 2021     MEDICARE ANNUAL WELLNESS VISIT  2021     BP Readings from Last 3 Encounters:   21 136/72   21 132/78   20 (!) 142/84    Wt Readings from Last 3 Encounters:   21 97.5 kg (215 lb)   21 98.9 kg (218 lb)   20 98.9 kg (218 lb)                  Patient Active Problem List   Diagnosis     Arthritis     Varicose veins of legs     HYPERLIPIDEMIA LDL GOAL <130     Advance care planning     Erectile dysfunction     Ganglion cyst     Throat clearing     Laceration     Head injury     Open wound of scalp     Left inguinal hernia     Plantar warts     PAC (premature atrial contraction)     Past Surgical History:    Procedure Laterality Date     ARTHRODESIS WRIST Left 3/18/2016    Procedure: ARTHRODESIS WRIST;  Surgeon: Mayda Teresa MD;  Location: [facility]     ARTHRODESIS WRIST Left 2016    Procedure: ARTHRODESIS WRIST;  Surgeon: Mayda Teresa MD;  Location: [facility]     COLONOSCOPY  2012    Procedure: COLONOSCOPY;  COLONOSCOPY;  Surgeon: Kris Garcia MD;  Location:  [facility]     COLONOSCOPY N/A 2017    Procedure: COLONOSCOPY;  COLONOSCOPY;  Surgeon: Emil Plaza MD;  Location:  GI     ENDOSCOPIC RELEASE CARPAL TUNNEL Left 3/18/2016    Procedure: ENDOSCOPIC RELEASE CARPAL TUNNEL;  Surgeon: Mayda Teresa MD;  Location: [facility]     EYE SURGERY  2016     JOINT REPLACEMTN, KNEE RT/LT      Joint Replacement knee LT, right hip replacment      REMOVE HARDWARE WRIST Left 2016    Procedure: REMOVE HARDWARE WRIST;  Surgeon: Mayda Teresa MD;  Location: [facility]     SURGICAL HISTORY OF -       arthroscopyx 3     SURGICAL HISTORY OF -       lt cholesteotoma     ZZC NONSPECIFIC PROCEDURE      diskectomy L5s       Social History     Tobacco Use     Smoking status: Former Smoker     Packs/day: 0.50     Years: 2.00     Pack years: 1.00     Types: Cigarettes     Start date: 1968     Quit date: 1972     Years since quittin.6     Smokeless tobacco: Never Used   Substance Use Topics     Alcohol use: Yes     Alcohol/week: 10.0 standard drinks     Comment: 3times/week     Family History   Problem Relation Age of Onset     Hyperlipidemia Father      C.A.D. Paternal Grandfather         MI         Current Outpatient Medications   Medication Sig Dispense Refill     amitriptyline (ELAVIL) 25 MG tablet TAKE 1 TO 2 TABLETS BY MOUTH EVERY DAY AT BEDTIME  0     atorvastatin (LIPITOR) 20 MG tablet Take 1 tablet (20 mg) by mouth daily 90 tablet 3     lisinopril-hydrochlorothiazide (ZESTORETIC) 10-12.5 MG tablet Take 1 tablet by mouth daily 90 tablet 3  "    ADVIL 200 MG PO TABS prn        ASPIRIN PO Take 81 mg by mouth daily       fish oil-omega-3 fatty acids 1000 MG capsule Take 2 capsules by mouth daily. 180 capsule 12     GLUCOSAMINE SULFATE PO Take 1,500 mg by mouth daily.         MULTI-VITAMIN OR TABS 1 q day   0     TYLENOL 325 MG PO TABS prn        VITAMIN D, CHOLECALCIFEROL, PO Take by mouth daily       Allergies   Allergen Reactions     Penicillins Hives     Recent Labs   Lab Test 03/11/21  1116 12/07/20  1102 08/24/20  1005 10/25/19  0944 05/01/14  0952 07/18/13  0953   LDL  --   --  124*  --  107 106   HDL  --   --  56  --  42 42   TRIG  --   --  113  --  174* 146   ALT  --   --   --  27 25 31   CR 0.81 0.84 0.85 0.88 0.90 0.82   GFRESTIMATED >90 89 89 88 86 >90   GFRESTBLACK >90 >90 >90 >90 >90 >90   POTASSIUM 4.6 4.8 4.7 4.2 4.4 5.1      Pneumonia Vaccine:Adults age 65+ who received Pneumovax (PPSV23) at 65 years or older: Should be given PCV13 > 1 year after their most recent PPSV23        Review of Systems   Constitutional: Negative for chills and fever.   HENT: Positive for congestion, ear pain and hearing loss. Negative for sore throat.    Eyes: Negative for pain and visual disturbance.   Respiratory: Negative for cough and shortness of breath.    Cardiovascular: Negative for chest pain, palpitations and peripheral edema.   Gastrointestinal: Negative for abdominal pain, constipation, diarrhea, heartburn, hematochezia and nausea.   Genitourinary: Negative for discharge, dysuria, frequency, genital sores, hematuria, impotence and urgency.   Musculoskeletal: Positive for arthralgias. Negative for joint swelling and myalgias.   Skin: Negative for rash.   Neurological: Negative for dizziness, weakness, headaches and paresthesias.   Psychiatric/Behavioral: Negative for mood changes. The patient is not nervous/anxious.          OBJECTIVE:   /72 (Cuff Size: Adult Large)   Pulse 75   Temp 97.5  F (36.4  C) (Tympanic)   Ht 1.78 m (5' 10.08\")   Wt " "97.5 kg (215 lb)   SpO2 96%   BMI 30.78 kg/m   Estimated body mass index is 30.78 kg/m  as calculated from the following:    Height as of this encounter: 1.78 m (5' 10.08\").    Weight as of this encounter: 97.5 kg (215 lb).  Physical Exam  GENERAL: healthy, alert and no distress  EYES: Eyes grossly normal to inspection, PERRL and conjunctivae and sclerae normal  HENT: ear canals and TM's normal, nose and mouth without ulcers or lesions  NECK: no adenopathy, no asymmetry, masses, or scars and thyroid normal to palpation  RESP: lungs clear to auscultation - no rales, rhonchi or wheezes  CV: regular rate and rhythm, normal S1 S2, no S3 or S4, no murmur, click or rub, no peripheral edema and peripheral pulses strong  ABDOMEN: soft, nontender, no hepatosplenomegaly, no masses and bowel sounds normal  MS: no gross musculoskeletal defects noted, no edema  SKIN: no suspicious lesions or rashes  NEURO: Normal strength and tone, mentation intact and speech normal  BACK: no CVA tenderness, no paralumbar tenderness  PSYCH: mentation appears normal, affect normal/bright  LYMPH: no cervical, supraclavicular, axillary, or inguinal adenopathy        ASSESSMENT / PLAN:       ICD-10-CM    1. Screening for prostate cancer  Z12.5 PSA, screen   2. Encounter for Medicare annual wellness exam  Z00.00 REVIEW OF HEALTH MAINTENANCE PROTOCOL ORDERS     CBC with platelets     Comprehensive metabolic panel (BMP + Alb, Alk Phos, ALT, AST, Total. Bili, TP)     Lipid panel reflex to direct LDL Fasting     PSA, screen       Patient has been advised of split billing requirements and indicates understanding: Yes  COUNSELING:  Reviewed preventive health counseling, as reflected in patient instructions    Estimated body mass index is 30.4 kg/m  as calculated from the following:    Height as of 3/11/21: 1.803 m (5' 11\").    Weight as of 3/11/21: 98.9 kg (218 lb).    Weight management plan: Patient referred to endocrine and/or weight management " specialty    He reports that he quit smoking about 49 years ago. His smoking use included cigarettes. He started smoking about 53 years ago. He has a 1.00 pack-year smoking history. He has never used smokeless tobacco.      Appropriate preventive services were discussed with this patient, including applicable screening as appropriate for cardiovascular disease, diabetes, osteopenia/osteoporosis, and glaucoma.  As appropriate for age/gender, discussed screening for colorectal cancer, prostate cancer, breast cancer, and cervical cancer. Checklist reviewing preventive services available has been given to the patient.    Reviewed patients plan of care and provided an AVS. The Basic Care Plan (routine screening as documented in Health Maintenance) for Dominguez meets the Care Plan requirement. This Care Plan has been established and reviewed with the Patient.    Counseling Resources:  ATP IV Guidelines  Pooled Cohorts Equation Calculator  Breast Cancer Risk Calculator  Breast Cancer: Medication to Reduce Risk  FRAX Risk Assessment  ICSI Preventive Guidelines  Dietary Guidelines for Americans, 2010  USDA's MyPlate  ASA Prophylaxis  Lung CA Screening    Brent Dietz MD  [facility]    Identified Health Risks:

## 2021-09-04 ENCOUNTER — HEALTH MAINTENANCE LETTER (OUTPATIENT)
Age: 71
End: 2021-09-04

## 2021-09-04 LAB
ALBUMIN SERPL-MCNC: 3.9 G/DL (ref 3.4–5)
ALP SERPL-CCNC: 77 U/L (ref 40–150)
ALT SERPL W P-5'-P-CCNC: 27 U/L (ref 0–70)
ANION GAP SERPL CALCULATED.3IONS-SCNC: 5 MMOL/L (ref 3–14)
AST SERPL W P-5'-P-CCNC: 23 U/L (ref 0–45)
BILIRUB SERPL-MCNC: 0.5 MG/DL (ref 0.2–1.3)
BUN SERPL-MCNC: 15 MG/DL (ref 7–30)
CALCIUM SERPL-MCNC: 8.9 MG/DL (ref 8.5–10.1)
CHLORIDE BLD-SCNC: 105 MMOL/L (ref 94–109)
CHOLEST SERPL-MCNC: 187 MG/DL
CO2 SERPL-SCNC: 26 MMOL/L (ref 20–32)
CREAT SERPL-MCNC: 1.01 MG/DL (ref 0.66–1.25)
FASTING STATUS PATIENT QL REPORTED: YES
GFR SERPL CREATININE-BSD FRML MDRD: 75 ML/MIN/1.73M2
GLUCOSE BLD-MCNC: 94 MG/DL (ref 70–99)
HDLC SERPL-MCNC: 52 MG/DL
LDLC SERPL CALC-MCNC: 125 MG/DL
NONHDLC SERPL-MCNC: 135 MG/DL
POTASSIUM BLD-SCNC: 4.8 MMOL/L (ref 3.4–5.3)
PROT SERPL-MCNC: 7.3 G/DL (ref 6.8–8.8)
PSA SERPL-MCNC: 0.55 UG/L (ref 0–4)
SODIUM SERPL-SCNC: 136 MMOL/L (ref 133–144)
TRIGL SERPL-MCNC: 49 MG/DL

## 2021-09-08 DIAGNOSIS — E78.5 HYPERLIPIDEMIA LDL GOAL <130: ICD-10-CM

## 2021-09-09 RX ORDER — ATORVASTATIN CALCIUM 20 MG/1
20 TABLET, FILM COATED ORAL DAILY
Qty: 90 TABLET | Refills: 3 | Status: SHIPPED | OUTPATIENT
Start: 2021-09-09 | End: 2022-09-21

## 2021-09-17 DIAGNOSIS — G62.89 OTHER POLYNEUROPATHY: Primary | ICD-10-CM

## 2021-11-03 ENCOUNTER — TELEPHONE (OUTPATIENT)
Dept: FAMILY MEDICINE | Facility: CLINIC | Age: 71
End: 2021-11-03

## 2021-11-03 NOTE — TELEPHONE ENCOUNTER
Pt calling states his wife tested positive for covid on 11/1 and wondering what he needs to do?  States he is not having any sxs at this time.    Advised should get tested in 3-5 days from exposure and does not need to quarantine if not having any sxs.  Should wear a mask when out in public.    Pt states understanding    Karena FARIAS RN  EP Triage

## 2022-07-25 ENCOUNTER — APPOINTMENT (OUTPATIENT)
Dept: URBAN - METROPOLITAN AREA CLINIC 255 | Age: 72
Setting detail: DERMATOLOGY
End: 2022-08-14

## 2022-07-25 VITALS — HEIGHT: 71 IN | WEIGHT: 214 LBS

## 2022-07-25 DIAGNOSIS — L82.1 OTHER SEBORRHEIC KERATOSIS: ICD-10-CM

## 2022-07-25 DIAGNOSIS — L81.4 OTHER MELANIN HYPERPIGMENTATION: ICD-10-CM

## 2022-07-25 DIAGNOSIS — L57.0 ACTINIC KERATOSIS: ICD-10-CM

## 2022-07-25 DIAGNOSIS — D18.0 HEMANGIOMA: ICD-10-CM

## 2022-07-25 DIAGNOSIS — Z71.89 OTHER SPECIFIED COUNSELING: ICD-10-CM

## 2022-07-25 DIAGNOSIS — D22 MELANOCYTIC NEVI: ICD-10-CM

## 2022-07-25 DIAGNOSIS — L72.0 EPIDERMAL CYST: ICD-10-CM

## 2022-07-25 PROBLEM — D18.01 HEMANGIOMA OF SKIN AND SUBCUTANEOUS TISSUE: Status: ACTIVE | Noted: 2022-07-25

## 2022-07-25 PROBLEM — D22.61 MELANOCYTIC NEVI OF RIGHT UPPER LIMB, INCLUDING SHOULDER: Status: ACTIVE | Noted: 2022-07-25

## 2022-07-25 PROBLEM — D22.5 MELANOCYTIC NEVI OF TRUNK: Status: ACTIVE | Noted: 2022-07-25

## 2022-07-25 PROCEDURE — 99213 OFFICE O/P EST LOW 20 MIN: CPT | Mod: 25

## 2022-07-25 PROCEDURE — 17000 DESTRUCT PREMALG LESION: CPT

## 2022-07-25 PROCEDURE — OTHER EDUCATIONAL RESOURCES PROVIDED: OTHER

## 2022-07-25 PROCEDURE — OTHER COUNSELING: OTHER

## 2022-07-25 PROCEDURE — OTHER MIPS QUALITY: OTHER

## 2022-07-25 PROCEDURE — 17003 DESTRUCT PREMALG LES 2-14: CPT

## 2022-07-25 PROCEDURE — OTHER LIQUID NITROGEN: OTHER

## 2022-07-25 ASSESSMENT — LOCATION DETAILED DESCRIPTION DERM
LOCATION DETAILED: RIGHT ANTERIOR LATERAL PROXIMAL UPPER ARM
LOCATION DETAILED: LEFT INFERIOR MEDIAL FOREHEAD
LOCATION DETAILED: INFERIOR THORACIC SPINE
LOCATION DETAILED: RIGHT INFERIOR MEDIAL FOREHEAD
LOCATION DETAILED: RIGHT INFERIOR FOREHEAD

## 2022-07-25 ASSESSMENT — LOCATION ZONE DERM
LOCATION ZONE: FACE
LOCATION ZONE: ARM
LOCATION ZONE: TRUNK

## 2022-07-25 ASSESSMENT — LOCATION SIMPLE DESCRIPTION DERM
LOCATION SIMPLE: UPPER BACK
LOCATION SIMPLE: RIGHT UPPER ARM
LOCATION SIMPLE: RIGHT FOREHEAD
LOCATION SIMPLE: LEFT FOREHEAD

## 2022-07-25 NOTE — PROCEDURE: LIQUID NITROGEN
Number Of Freeze-Thaw Cycles: 1 freeze-thaw cycle
Render Note In Bullet Format When Appropriate: No
Post-Care Instructions: I reviewed with the patient in detail post-care instructions. Patient is to wear sunprotection, and avoid picking at any of the treated lesions. Pt may apply Vaseline to crusted or scabbing areas.
Show Applicator Variable?: Yes
Duration Of Freeze Thaw-Cycle (Seconds): 10
Consent: The patient's consent was obtained including but not limited to risks of crusting, scabbing, blistering, scarring, darker or lighter pigmentary change, recurrence, incomplete removal and infection.
Detail Level: Simple

## 2022-07-25 NOTE — PROCEDURE: MIPS QUALITY
Quality 111:Pneumonia Vaccination Status For Older Adults: Pneumococcal vaccine administered on or after patient’s 60th birthday and before the end of the measurement period
Detail Level: Detailed
Quality 431: Preventive Care And Screening: Unhealthy Alcohol Use - Screening: Patient not identified as an unhealthy alcohol user when screened for unhealthy alcohol use using a systematic screening method
Quality 130: Documentation Of Current Medications In The Medical Record: Current Medications Documented
Quality 226: Preventive Care And Screening: Tobacco Use: Screening And Cessation Intervention: Patient screened for tobacco use, is a smoker AND received Cessation Counseling within the Previous 12 Months
Quality 110: Preventive Care And Screening: Influenza Immunization: Influenza Immunization previously received during influenza season

## 2022-07-25 NOTE — HPI: FULL BODY SKIN EXAMINATION
What Type Of Note Output Would You Prefer (Optional)?: Standard Output
What Is The Reason For Today's Visit?: Full Body Skin Examination
What Is The Reason For Today's Visit? (Being Monitored For X): concerning skin lesions on an annual basis
Additional History: Patient has a few areas of concern for today's visit. There is a lesion on his right upper arm that his wife pointed out. It has been there for years, but he would like to have it evaluated. There are bumpy, scaly lesions on his forehead that may require liquid nitrogen treatment, which he has had in the past. He noticed these lesions became more prominent after he was in [place].

## 2022-08-10 DIAGNOSIS — I10 ESSENTIAL HYPERTENSION: ICD-10-CM

## 2022-08-11 RX ORDER — LISINOPRIL/HYDROCHLOROTHIAZIDE 10-12.5 MG
TABLET ORAL
Qty: 90 TABLET | Refills: 0 | Status: SHIPPED | OUTPATIENT
Start: 2022-08-11 | End: 2023-07-21

## 2022-08-11 NOTE — TELEPHONE ENCOUNTER
Prescription approved per [facility] Refill Protocol.  Mariangel Weaver RN  [facility]

## 2022-08-16 ENCOUNTER — OFFICE VISIT (OUTPATIENT)
Dept: FAMILY MEDICINE | Facility: CLINIC | Age: 72
End: 2022-08-16
Payer: COMMERCIAL

## 2022-08-16 VITALS
RESPIRATION RATE: 18 BRPM | SYSTOLIC BLOOD PRESSURE: 152 MMHG | BODY MASS INDEX: 32.04 KG/M2 | HEIGHT: 70 IN | TEMPERATURE: 98.5 F | OXYGEN SATURATION: 95 % | HEART RATE: 86 BPM | WEIGHT: 223.8 LBS | DIASTOLIC BLOOD PRESSURE: 80 MMHG

## 2022-08-16 DIAGNOSIS — E78.5 HYPERLIPIDEMIA LDL GOAL <130: ICD-10-CM

## 2022-08-16 DIAGNOSIS — Z01.818 PREOP GENERAL PHYSICAL EXAM: Primary | ICD-10-CM

## 2022-08-16 DIAGNOSIS — H33.22 LEFT RETINAL DETACHMENT: ICD-10-CM

## 2022-08-16 DIAGNOSIS — I10 ESSENTIAL HYPERTENSION: ICD-10-CM

## 2022-08-16 PROCEDURE — 99214 OFFICE O/P EST MOD 30 MIN: CPT | Performed by: FAMILY MEDICINE

## 2022-08-16 RX ORDER — POLYMYXIN B SULFATE AND TRIMETHOPRIM 1; 10000 MG/ML; [USP'U]/ML
1 SOLUTION OPHTHALMIC
COMMUNITY
Start: 2022-08-05 | End: 2022-08-16

## 2022-08-16 RX ORDER — PREDNISOLONE ACETATE 10 MG/ML
1 SUSPENSION/ DROPS OPHTHALMIC
COMMUNITY
Start: 2022-08-05 | End: 2022-08-16

## 2022-08-16 RX ORDER — PREDNISOLONE ACETATE 10 MG/ML
SUSPENSION/ DROPS OPHTHALMIC
COMMUNITY
Start: 2022-08-05 | End: 2024-01-03

## 2022-08-16 RX ORDER — POLYMYXIN B SULFATE AND TRIMETHOPRIM 1; 10000 MG/ML; [USP'U]/ML
SOLUTION OPHTHALMIC
COMMUNITY
Start: 2022-08-05 | End: 2024-01-03

## 2022-08-16 ASSESSMENT — PAIN SCALES - GENERAL: PAINLEVEL: NO PAIN (1)

## 2022-08-16 NOTE — PROGRESS NOTES
Touched base with TC for team 1, she will fax pre-op for surgery tomorrow.     Sakshi MAI RN  [facility]

## 2022-08-16 NOTE — PROGRESS NOTES
[address] [address]  Phone: [phone]  Primary Provider: Brent Dietz  Pre-op Performing Provider: CARLOS SIERRA      PREOPERATIVE EVALUATION:  Today's date: 8/16/2022    Dominguez Pittman is a 72 year old male who presents for a preoperative evaluation.    Surgical Information:  Surgery/Procedure: Left Eye Retina Re-attach   Surgery Location: [facility]  Surgeon: Dr. Emil Canseco   Surgery Date: 8/17/2022  Time of Surgery: 8:15am  Where patient plans to recover: At home with family  Fax number for surgical facility: [phone] and [phone]    Type of Anesthesia Anticipated: Local with MAC    Assessment & Plan     The proposed surgical procedure is considered LOW risk.      ICD-10-CM    1. Preop general physical exam  Z01.818    2. Left retinal detachment  H33.22    3. Essential hypertension  I10    4. Hyperlipidemia LDL goal <130  E78.5             Risks and Recommendations:  The patient has the following additional risks and recommendations for perioperative complications:   - No identified additional risk factors other than previously addressed        RECOMMENDATION:  APPROVAL GIVEN to proceed with proposed procedure, without further diagnostic evaluation.      Subjective     HPI related to upcoming procedure:       Preop Questions 8/16/2022   1. Have you ever had a heart attack or stroke? No   2. Have you ever had surgery on your heart or blood vessels, such as a stent placement, a coronary artery bypass, or surgery on an artery in your head, neck, heart, or legs? No   3. Do you have chest pain with activity? No   4. Do you have a history of  heart failure? No   5. Do you currently have a cold, bronchitis or symptoms of other infection? No   6. Do you have a cough, shortness of breath, or wheezing? No   7. Do you or anyone in your family have previous history of blood clots? No   8. Do you or does anyone in  your family have a serious bleeding problem such as prolonged bleeding following surgeries or cuts? No   9. Have you ever had problems with anemia or been told to take iron pills? No   10. Have you had any abnormal blood loss such as black, tarry or bloody stools? No   11. Have you ever had a blood transfusion? No   12. Are you willing to have a blood transfusion if it is medically needed before, during, or after your surgery? Yes   13. Have you or any of your relatives ever had problems with anesthesia? No   14. Do you have sleep apnea, excessive snoring or daytime drowsiness? No   15. Do you have any artifical heart valves or other implanted medical devices like a pacemaker, defibrillator, or continuous glucose monitor? No   16. Do you have artificial joints? YES    17. Are you allergic to latex? No     Health Care Directive:  Patient has a Health Care Directive on file    Patient history of hypertension.  He is using Zestoretic regularly.  Reports that his blood pressure checks at home show systolic blood pressure in the 130s always.  He has never had high or too low blood pressure readings at home.  Denies any chest pain or shortness of breath.  Today he is noted to have elevated blood pressure at 152/80.  He tells that he is very stressed out about the upcoming procedure tomorrow.    He also takes his cholesterol medication regularly and amitriptyline for neuropathy symptoms at night.    His aspirin and supplements are on hold at this time.  Review of Systems  CONSTITUTIONAL: NEGATIVE for fever, chills, change in weight  ENT/MOUTH: NEGATIVE for ear, mouth and throat problems  RESP: NEGATIVE for significant cough or SOB  CV: NEGATIVE for chest pain, palpitations or peripheral edema    Patient Active Problem List    Diagnosis Date Noted     PAC (premature atrial contraction) 05/01/2014     Priority: Medium     Left inguinal hernia 07/18/2013     Priority: Medium     Plantar warts 07/18/2013     Priority: Medium      Open wound of scalp 01/09/2013     Priority: Medium     Problem list name updated by automated process. Provider to review       Laceration 11/19/2012     Priority: Medium     Head injury 11/19/2012     Priority: Medium     Ganglion cyst 07/24/2012     Priority: Medium     Throat clearing 07/24/2012     Priority: Medium     Advance care planning 07/19/2011     Priority: Medium     Advance Care Planning 6/17/2016: Receipt of ACP document:  Received: Health Care Directive which was witnessed or notarized on 1/27/16.  Document previously scanned on 3/24/16.  Validation form completed and sent to be scanned.  Code Status needs to be updated to reflect choices in most recent ACP document. Confirmed/documented designated decision maker(s).  Added by Ines De León   Advance Care Planning Liaison  Advance Care Planning 7/19/11: Patient will bring in Copy       Erectile dysfunction 07/19/2011     Priority: Medium     HYPERLIPIDEMIA LDL GOAL <130 10/31/2010     Priority: Medium     Varicose veins of legs 06/28/2010     Priority: Medium     Arthritis 07/14/2008     Priority: Medium      Past Medical History:   Diagnosis Date     Arthritis      High cholesterol      Hx musculoskletl dis NEC      Hypertension      Other nonspecific findings on examination of blood(790.99)      PAC (premature atrial contraction)      Personal history of other disorders of nervous system and sense organs      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      Past Surgical History:   Procedure Laterality Date     ARTHRODESIS WRIST Left 3/18/2016    Procedure: ARTHRODESIS WRIST;  Surgeon: Mayda Teresa MD;  Location: [facility]     ARTHRODESIS WRIST Left 12/21/2016    Procedure: ARTHRODESIS WRIST;  Surgeon: Mayda Teresa MD;  Location: [facility]     COLONOSCOPY  7/27/2012    Procedure: COLONOSCOPY;  COLONOSCOPY;  Surgeon: Kris Garcia MD;  Location:  [facility]     COLONOSCOPY N/A 8/1/2017     Procedure: COLONOSCOPY;  COLONOSCOPY;  Surgeon: Emil Plaza MD;  Location:  GI     ENDOSCOPIC RELEASE CARPAL TUNNEL Left 3/18/2016    Procedure: ENDOSCOPIC RELEASE CARPAL TUNNEL;  Surgeon: Mayda Teresa MD;  Location: [facility]     EYE SURGERY  2016     JOINT REPLACEMTN, KNEE RT/LT      Joint Replacement knee LT, right hip replacment      REMOVE HARDWARE WRIST Left 2016    Procedure: REMOVE HARDWARE WRIST;  Surgeon: Mayda Teresa MD;  Location: [facility]     SURGICAL HISTORY OF -       arthroscopyx 3     SURGICAL HISTORY OF -       lt cholesteotoma     ZZC NONSPECIFIC PROCEDURE      diskectomy L5s     Current Outpatient Medications   Medication Sig Dispense Refill     amitriptyline (ELAVIL) 25 MG tablet TAKE 1 TO 2 TABLETS BY MOUTH AT BEDTIME 180 tablet 3     atorvastatin (LIPITOR) 20 MG tablet Take 1 tablet (20 mg) by mouth daily 90 tablet 3     lisinopril-hydrochlorothiazide (ZESTORETIC) 10-12.5 MG tablet TAKE 1 TABLET BY MOUTH EVERY DAY 90 tablet 0     MULTI-VITAMIN OR TABS 1 q day   0     prednisoLONE acetate (PRED FORTE) 1 % ophthalmic suspension INSTILL 1 (ONE) DROP INTO LEFT EYE 4 TIMES DAILY       trimethoprim-polymyxin b (POLYTRIM) 10897-3.1 UNIT/ML-% ophthalmic solution INSTILL 1 (ONE) DROP INTO LEFT EYE 4 TIMES DAILY       VITAMIN D, CHOLECALCIFEROL, PO Take by mouth daily       ASPIRIN PO Take 81 mg by mouth daily       fish oil-omega-3 fatty acids 1000 MG capsule Take 2 capsules by mouth daily. 180 capsule 12     GLUCOSAMINE SULFATE PO Take 1,500 mg by mouth daily.           Allergies   Allergen Reactions     Penicillins Hives        Social History     Tobacco Use     Smoking status: Former Smoker     Packs/day: 0.50     Years: 2.00     Pack years: 1.00     Types: Cigarettes     Start date: 1968     Quit date: 1972     Years since quittin.5     Smokeless tobacco: Never Used   Substance Use Topics     Alcohol use: Yes     Alcohol/week:  "10.0 standard drinks     Comment: 3times/week       History   Drug Use No         Objective     BP (!) 152/80   Pulse 86   Temp 98.5  F (36.9  C)   Resp 18   Ht 1.778 m (5' 10\")   Wt 101.5 kg (223 lb 12.8 oz)   SpO2 95%   BMI 32.11 kg/m      Physical Exam  GENERAL APPEARANCE: healthy, alert and no distress  Eye: Left eye: Conjunctival hemorrhage noted  HENT: ear canals and TM's normal and nose and mouth without ulcers or lesions  RESP: lungs clear to auscultation - no rales, rhonchi or wheezes  CV: regular rate and rhythm, normal S1 S2, no S3 or S4 and no murmur, click or rub   ABDOMEN: soft, nontender, no HSM or masses and bowel sounds normal  NEURO: Normal strength and tone, sensory exam grossly normal, mentation intact and speech normal    Recent Labs   Lab Test 09/03/21  1108 03/11/21  1116 12/07/20  1102 08/24/20  1005   HGB 11.0* 11.7*   < > 12.1*     --   --  273    135   < > 135   POTASSIUM 4.8 4.6   < > 4.7   CR 1.01 0.81   < > 0.85    < > = values in this interval not displayed.        Diagnostics:     No EKG required for low risk surgery (cataract, skin procedure, breast biopsy, etc).    Revised Cardiac Risk Index (RCRI):  The patient has the following serious cardiovascular risks for perioperative complications:   - No serious cardiac risks = 0 points     RCRI Interpretation: 0 points: Class I (very low risk - 0.4% complication rate)           Signed Electronically by: John Alatorre MD  Copy of this evaluation report is provided to requesting physician.      "

## 2022-08-16 NOTE — PATIENT INSTRUCTIONS
Preventive Health Recommendations:     See your health care provider every year to    Review health changes.     Discuss preventive care.      Review your medicines if your doctor has prescribed any.      Talk with your health care provider about whether you should have a test to screen for prostate cancer (PSA).    Every 3 years, have a diabetes test (fasting glucose). If you are at risk for diabetes, you should have this test more often.    Every 5 years, have a cholesterol test. Have this test more often if you are at risk for high cholesterol or heart disease.     Every 10 years, have a colonoscopy. Or, have a yearly FIT test (stool test). These exams will check for colon cancer.    Talk to with your health care provider about screening for Abdominal Aortic Aneurysm if you have a family history of AAA or have a history of smoking.    Shots:     Get a flu shot each year.     Get a tetanus shot every 10 years.     Talk to your doctor about your pneumonia vaccines. There are now two you should receive - Pneumovax (PPSV 23) and Prevnar (PCV 13).     Talk to your pharmacist about a shingles vaccine.     Talk to your doctor about the hepatitis B vaccine.  Nutrition:     Eat at least 5 servings of fruits and vegetables each day.     Eat whole-grain bread, whole-wheat pasta and brown rice instead of white grains and rice.     Get adequate Calcium and Vitamin D.   Lifestyle    Exercise for at least 150 minutes a week (30 minutes a day, 5 days a week). This will help you control your weight and prevent disease.     Limit alcohol to one drink per day.     No smoking.     Wear sunscreen to prevent skin cancer.    See your dentist every six months for an exam and cleaning.    See your eye doctor every 1 to 2 years to screen for conditions such as glaucoma, macular degeneration, cataracts, etc.    Personalized Prevention Plan  You are due for the preventive services outlined below.  Your care team is available to assist you  in scheduling these services.  If you have already completed any of these items, please share that information with your care team to update in your medical record.  Health Maintenance Due   Topic Date Due     Pneumococcal Vaccine (1 - PCV) Never done     AORTIC ANEURYSM SCREENING (SYSTEM ASSIGNED)  Never done     PHQ-2 (once per calendar year)  01/01/2022     COVID-19 Vaccine (4 - Booster for Pfizer series) 04/20/2022     Annual Wellness Visit  09/03/2022     ANNUAL REVIEW OF HM ORDERS  09/03/2022     FALL RISK ASSESSMENT  09/03/2022

## 2022-09-16 DIAGNOSIS — E78.5 HYPERLIPIDEMIA LDL GOAL <130: ICD-10-CM

## 2022-09-21 RX ORDER — ATORVASTATIN CALCIUM 20 MG/1
TABLET, FILM COATED ORAL
Qty: 90 TABLET | Refills: 3 | Status: SHIPPED | OUTPATIENT
Start: 2022-09-21 | End: 2023-10-06

## 2022-09-21 NOTE — TELEPHONE ENCOUNTER
Routing refill request to provider for review/approval because:  LDL Cholesterol Calculated   Date Value Ref Range Status   09/03/2021 125 (H) <=100 mg/dL Final     Comment:     Age 0-19 years:  Desirable: 0-110 mg/dL   Borderline high: 110-129 mg/dL   High: >= 130 mg/dL    Age 20 years and older:  Desirable: <100mg/dL  Above desirable: 100-129 mg/dL   Borderline high: 130-159 mg/dL   High: 160-189 mg/dL   Very high: >= 190 mg/dL   08/24/2020 124 (H) <100 mg/dL Final     Comment:     Above desirable:  100-129 mg/dl  Borderline High:  130-159 mg/dL  High:             160-189 mg/dL  Very high:       >189 mg/dl           Omero Bains RN  [facility] Triage Nurse

## 2022-09-27 ASSESSMENT — ENCOUNTER SYMPTOMS
ABDOMINAL PAIN: 0
WEAKNESS: 0
HEADACHES: 0
EYE PAIN: 1
FREQUENCY: 0
NAUSEA: 0
PARESTHESIAS: 0
DIZZINESS: 0
FEVER: 0
JOINT SWELLING: 0
HEARTBURN: 0
HEMATURIA: 0
CONSTIPATION: 0
PALPITATIONS: 0
SHORTNESS OF BREATH: 0
NERVOUS/ANXIOUS: 0
SORE THROAT: 0
CHILLS: 0
DIARRHEA: 0
COUGH: 0
MYALGIAS: 0
HEMATOCHEZIA: 0
ARTHRALGIAS: 1
DYSURIA: 0

## 2022-09-27 ASSESSMENT — ACTIVITIES OF DAILY LIVING (ADL): CURRENT_FUNCTION: NO ASSISTANCE NEEDED

## 2022-10-04 ENCOUNTER — OFFICE VISIT (OUTPATIENT)
Dept: FAMILY MEDICINE | Facility: CLINIC | Age: 72
End: 2022-10-04
Payer: COMMERCIAL

## 2022-10-04 ENCOUNTER — TRANSFERRED RECORDS (OUTPATIENT)
Dept: HEALTH INFORMATION MANAGEMENT | Facility: CLINIC | Age: 72
End: 2022-10-04

## 2022-10-04 VITALS
BODY MASS INDEX: 31.21 KG/M2 | DIASTOLIC BLOOD PRESSURE: 70 MMHG | WEIGHT: 218 LBS | HEART RATE: 92 BPM | SYSTOLIC BLOOD PRESSURE: 134 MMHG | RESPIRATION RATE: 16 BRPM | HEIGHT: 70 IN | OXYGEN SATURATION: 98 %

## 2022-10-04 DIAGNOSIS — Z23 HIGH PRIORITY FOR 2019-NCOV VACCINE: ICD-10-CM

## 2022-10-04 DIAGNOSIS — Z00.00 HEALTHCARE MAINTENANCE: Primary | ICD-10-CM

## 2022-10-04 DIAGNOSIS — Z12.5 SCREENING FOR PROSTATE CANCER: ICD-10-CM

## 2022-10-04 LAB
ALBUMIN SERPL-MCNC: 4.1 G/DL (ref 3.4–5)
ALP SERPL-CCNC: 94 U/L (ref 40–150)
ALT SERPL W P-5'-P-CCNC: 23 U/L (ref 0–70)
ANION GAP SERPL CALCULATED.3IONS-SCNC: 6 MMOL/L (ref 3–14)
AST SERPL W P-5'-P-CCNC: 18 U/L (ref 0–45)
BILIRUB SERPL-MCNC: 0.5 MG/DL (ref 0.2–1.3)
BUN SERPL-MCNC: 13 MG/DL (ref 7–30)
CALCIUM SERPL-MCNC: 9.3 MG/DL (ref 8.5–10.1)
CHLORIDE BLD-SCNC: 102 MMOL/L (ref 94–109)
CHOLEST SERPL-MCNC: 164 MG/DL
CO2 SERPL-SCNC: 25 MMOL/L (ref 20–32)
CREAT SERPL-MCNC: 0.75 MG/DL (ref 0.66–1.25)
ERYTHROCYTE [DISTWIDTH] IN BLOOD BY AUTOMATED COUNT: 13.2 % (ref 10–15)
FASTING STATUS PATIENT QL REPORTED: YES
GFR SERPL CREATININE-BSD FRML MDRD: >90 ML/MIN/1.73M2
GLUCOSE BLD-MCNC: 107 MG/DL (ref 70–99)
HCT VFR BLD AUTO: 36.7 % (ref 40–53)
HDLC SERPL-MCNC: 49 MG/DL
HGB BLD-MCNC: 12.2 G/DL (ref 13.3–17.7)
LDLC SERPL CALC-MCNC: 105 MG/DL
MCH RBC QN AUTO: 30.7 PG (ref 26.5–33)
MCHC RBC AUTO-ENTMCNC: 33.2 G/DL (ref 31.5–36.5)
MCV RBC AUTO: 92 FL (ref 78–100)
NONHDLC SERPL-MCNC: 115 MG/DL
PLATELET # BLD AUTO: 265 10E3/UL (ref 150–450)
POTASSIUM BLD-SCNC: 5.1 MMOL/L (ref 3.4–5.3)
PROT SERPL-MCNC: 7.6 G/DL (ref 6.8–8.8)
PSA SERPL-MCNC: 0.62 UG/L (ref 0–4)
RBC # BLD AUTO: 3.97 10E6/UL (ref 4.4–5.9)
SODIUM SERPL-SCNC: 133 MMOL/L (ref 133–144)
TRIGL SERPL-MCNC: 52 MG/DL
WBC # BLD AUTO: 5.3 10E3/UL (ref 4–11)

## 2022-10-04 PROCEDURE — 85027 COMPLETE CBC AUTOMATED: CPT | Performed by: FAMILY MEDICINE

## 2022-10-04 PROCEDURE — G0103 PSA SCREENING: HCPCS | Performed by: FAMILY MEDICINE

## 2022-10-04 PROCEDURE — G0439 PPPS, SUBSEQ VISIT: HCPCS | Performed by: FAMILY MEDICINE

## 2022-10-04 PROCEDURE — 80053 COMPREHEN METABOLIC PANEL: CPT | Performed by: FAMILY MEDICINE

## 2022-10-04 PROCEDURE — 0124A COVID-19,PF,PFIZER BOOSTER BIVALENT: CPT | Performed by: FAMILY MEDICINE

## 2022-10-04 PROCEDURE — G0008 ADMIN INFLUENZA VIRUS VAC: HCPCS | Performed by: FAMILY MEDICINE

## 2022-10-04 PROCEDURE — 80061 LIPID PANEL: CPT | Performed by: FAMILY MEDICINE

## 2022-10-04 PROCEDURE — 90662 IIV NO PRSV INCREASED AG IM: CPT | Performed by: FAMILY MEDICINE

## 2022-10-04 PROCEDURE — 91312 COVID-19,PF,PFIZER BOOSTER BIVALENT: CPT | Performed by: FAMILY MEDICINE

## 2022-10-04 PROCEDURE — 36415 COLL VENOUS BLD VENIPUNCTURE: CPT | Performed by: FAMILY MEDICINE

## 2022-10-04 RX ORDER — BRIMONIDINE TARTRATE 2 MG/ML
SOLUTION/ DROPS OPHTHALMIC
COMMUNITY
Start: 2022-09-02 | End: 2024-01-03

## 2022-10-04 ASSESSMENT — ENCOUNTER SYMPTOMS
JOINT SWELLING: 0
PARESTHESIAS: 0
COUGH: 0
DIARRHEA: 0
NERVOUS/ANXIOUS: 0
MYALGIAS: 0
WEAKNESS: 0
EYE PAIN: 1
NAUSEA: 0
PALPITATIONS: 0
HEARTBURN: 0
FREQUENCY: 0
SHORTNESS OF BREATH: 0
ABDOMINAL PAIN: 0
DYSURIA: 0
ARTHRALGIAS: 1
CONSTIPATION: 0
HEMATURIA: 0
CHILLS: 0
FEVER: 0
SORE THROAT: 0
DIZZINESS: 0
HEMATOCHEZIA: 0
HEADACHES: 0

## 2022-10-04 ASSESSMENT — PAIN SCALES - GENERAL: PAINLEVEL: NO PAIN (0)

## 2022-10-04 ASSESSMENT — ACTIVITIES OF DAILY LIVING (ADL): CURRENT_FUNCTION: NO ASSISTANCE NEEDED

## 2022-10-04 NOTE — PROGRESS NOTES
"SUBJECTIVE:   DENVER is a 72 year old who presents for Preventive Visit.      Patient has been advised of split billing requirements and indicates understanding: Yes  Are you in the first 12 months of your Medicare coverage?  No    Healthy Habits:     In general, how would you rate your overall health?  Good    Frequency of exercise:  2-3 days/week    Duration of exercise:  30-45 minutes    Do you usually eat at least 4 servings of fruit and vegetables a day, include whole grains    & fiber and avoid regularly eating high fat or \"junk\" foods?  Yes    Taking medications regularly:  Yes    Medication side effects:  None    Ability to successfully perform activities of daily living:  No assistance needed    Home Safety:  No safety concerns identified    Hearing Impairment:  Need to ask people to speak up or repeat themselves    In the past 6 months, have you been bothered by leaking of urine?  No    In general, how would you rate your overall mental or emotional health?  Good      PHQ-2 Total Score: 0    Additional concerns today:  No    Do you feel safe in your environment? Yes    Have you ever done Advance Care Planning? (For example, a Health Directive, POLST, or a discussion with a medical provider or your loved ones about your wishes): Yes, advance care planning is on file.       Fall risk  Fallen 2 or more times in the past year?: No  Any fall with injury in the past year?: No    Cognitive Screening   1) Repeat 3 items (Leader, Season, Table)    2) Clock draw: NORMAL  3) 3 item recall: Recalls 3 objects  Results: 3 items recalled: COGNITIVE IMPAIRMENT LESS LIKELY    Mini-CogTM Copyright LAKHWINDER Pagan. Licensed by the author for use in [facility]; reprinted with permission ([email or web address]). All rights reserved.      Do you have sleep apnea, excessive snoring or daytime drowsiness?: no    Reviewed and updated as needed this visit by clinical staff   Tobacco  Allergies  Meds                Reviewed and " updated as needed this visit by Provider                   Social History     Tobacco Use     Smoking status: Former Smoker     Packs/day: 0.50     Years: 2.00     Pack years: 1.00     Types: Cigarettes     Start date: 1968     Quit date: 1972     Years since quittin.7     Smokeless tobacco: Never Used   Substance Use Topics     Alcohol use: Yes     Alcohol/week: 10.0 standard drinks     Comment: 3times/week         Alcohol Use 2022   Prescreen: >3 drinks/day or >7 drinks/week? Yes   Prescreen: >3 drinks/day or >7 drinks/week? -   AUDIT SCORE  8               Current providers sharing in care for this patient include:   Patient Care Team:  Brent Dietz MD as PCP - General (Family Practice)  Brent Dietz MD as Assigned PCP    The following health maintenance items are reviewed in Epic and correct as of today:  Health Maintenance   Topic Date Due     Pneumococcal Vaccine: 65+ Years (1 - PCV) Never done     AORTIC ANEURYSM SCREENING (SYSTEM ASSIGNED)  Never done     COVID-19 Vaccine (4 - Booster for Pfizer series) 2022     INFLUENZA VACCINE (1) 2022     MEDICARE ANNUAL WELLNESS VISIT  2022     ANNUAL REVIEW OF HM ORDERS  2022     FALL RISK ASSESSMENT  10/04/2023     DTAP/TDAP/TD IMMUNIZATION (3 - Td or Tdap) 2024     LIPID  2026     ADVANCE CARE PLANNING  2026     COLORECTAL CANCER SCREENING  2027     PHQ-2 (once per calendar year)  Completed     ZOSTER IMMUNIZATION  Completed     IPV IMMUNIZATION  Aged Out     MENINGITIS IMMUNIZATION  Aged Out     HEPATITIS B IMMUNIZATION  Aged Out     HEPATITIS C SCREENING  Discontinued     BP Readings from Last 3 Encounters:   10/04/22 134/70   22 (!) 152/80   21 136/72    Wt Readings from Last 3 Encounters:   10/04/22 98.9 kg (218 lb)   22 101.5 kg (223 lb 12.8 oz)   21 97.5 kg (215 lb)                  Patient Active Problem List   Diagnosis     Arthritis     Varicose veins of legs      HYPERLIPIDEMIA LDL GOAL <130     Advance care planning     Erectile dysfunction     Ganglion cyst     Throat clearing     Laceration     Head injury     Open wound of scalp     Left inguinal hernia     Plantar warts     PAC (premature atrial contraction)     Past Surgical History:   Procedure Laterality Date     ARTHRODESIS WRIST Left 2016    Procedure: ARTHRODESIS WRIST;  Surgeon: Mayda Teresa MD;  Location: [facility]     ARTHRODESIS WRIST Left 2016    Procedure: ARTHRODESIS WRIST;  Surgeon: Mayda Teresa MD;  Location: [facility]     COLONOSCOPY  2012    Procedure: COLONOSCOPY;  COLONOSCOPY;  Surgeon: Kris Garcia MD;  Location:  [facility]     COLONOSCOPY N/A 2017    Procedure: COLONOSCOPY;  COLONOSCOPY;  Surgeon: Emil Plaza MD;  Location:  GI     ENDOSCOPIC RELEASE CARPAL TUNNEL Left 2016    Procedure: ENDOSCOPIC RELEASE CARPAL TUNNEL;  Surgeon: Mayda Teresa MD;  Location: [facility]     EYE SURGERY  2016     JOINT REPLACEMTN, KNEE RT/LT  2007    Joint Replacement knee LT, right hip replacment      REMOVE HARDWARE WRIST Left 2016    Procedure: REMOVE HARDWARE WRIST;  Surgeon: Mayda Teresa MD;  Location: [facility]     SOFT TISSUE SURGERY  2019     SURGICAL HISTORY OF -       arthroscopyx 3     SURGICAL HISTORY OF -       lt cholesteotoma     ZZC NONSPECIFIC PROCEDURE      diskectomy L5s       Social History     Tobacco Use     Smoking status: Former Smoker     Packs/day: 0.50     Years: 2.00     Pack years: 1.00     Types: Cigarettes     Start date: 1968     Quit date: 1972     Years since quittin.7     Smokeless tobacco: Never Used   Substance Use Topics     Alcohol use: Yes     Alcohol/week: 10.0 standard drinks     Comment: 3times/week     Family History   Problem Relation Age of Onset     Hyperlipidemia Father      C.A.D. Paternal Grandfather         MI         Current Outpatient Medications    Medication Sig Dispense Refill     amitriptyline (ELAVIL) 25 MG tablet TAKE 1 TO 2 TABLETS BY MOUTH AT BEDTIME 180 tablet 3     ASPIRIN PO Take 81 mg by mouth daily       atorvastatin (LIPITOR) 20 MG tablet TAKE 1 TABLET BY MOUTH EVERY DAY 90 tablet 3     brimonidine (ALPHAGAN) 0.2 % ophthalmic solution PLACE 1 DROP INTO INTO LEFT EYE TWICE A DAY       fish oil-omega-3 fatty acids 1000 MG capsule Take 2 capsules by mouth daily. 180 capsule 12     GLUCOSAMINE SULFATE PO Take 1,500 mg by mouth daily.         lisinopril-hydrochlorothiazide (ZESTORETIC) 10-12.5 MG tablet TAKE 1 TABLET BY MOUTH EVERY DAY 90 tablet 0     MULTI-VITAMIN OR TABS 1 q day   0     prednisoLONE acetate (PRED FORTE) 1 % ophthalmic suspension INSTILL 1 (ONE) DROP INTO LEFT EYE 4 TIMES DAILY       trimethoprim-polymyxin b (POLYTRIM) 38748-7.1 UNIT/ML-% ophthalmic solution INSTILL 1 (ONE) DROP INTO LEFT EYE 4 TIMES DAILY       VITAMIN D, CHOLECALCIFEROL, PO Take by mouth daily       Allergies   Allergen Reactions     Penicillins Hives     Recent Labs   Lab Test 09/03/21  1108 03/11/21  1116 12/07/20  1102 08/24/20  1005 10/25/19  0944   *  --   --  124*  --    HDL 52  --   --  56  --    TRIG 49  --   --  113  --    ALT 27  --   --   --  27   CR 1.01 0.81 0.84 0.85 0.88   GFRESTIMATED 75 >90 89 89 88   GFRESTBLACK  --  >90 >90 >90 >90   POTASSIUM 4.8 4.6 4.8 4.7 4.2      Pneumonia Vaccine:For adults with an immunocompromising condition, cerebrospinal fluid leak, or cochlear implant, administer 1 dose of PCV13 first then give 1 dose of PPSV23 at least 1 year later. Anyone who received any doses of PPSV23 before age 65 should receive 1 final dose of the vaccine at age 65 or older. Administer this last dose at least 5 years after the prior PPSV23 dose.      Review of Systems   Constitutional: Negative for chills and fever.   HENT: Positive for congestion and hearing loss. Negative for ear pain and sore throat.    Eyes: Positive for pain and  "visual disturbance.   Respiratory: Negative for cough and shortness of breath.    Cardiovascular: Negative for chest pain, palpitations and peripheral edema.   Gastrointestinal: Negative for abdominal pain, constipation, diarrhea, heartburn, hematochezia and nausea.   Genitourinary: Negative for dysuria, frequency, genital sores, hematuria, impotence, penile discharge and urgency.   Musculoskeletal: Positive for arthralgias. Negative for joint swelling and myalgias.   Skin: Negative for rash.   Neurological: Negative for dizziness, weakness, headaches and paresthesias.   Psychiatric/Behavioral: Negative for mood changes. The patient is not nervous/anxious.          OBJECTIVE:   /70   Pulse 92   Resp 16   Ht 1.784 m (5' 10.24\")   Wt 98.9 kg (218 lb)   SpO2 98%   BMI 31.07 kg/m   Estimated body mass index is 31.07 kg/m  as calculated from the following:    Height as of this encounter: 1.784 m (5' 10.24\").    Weight as of this encounter: 98.9 kg (218 lb).  Physical Exam  GENERAL: healthy, alert and no distress  EYES: Eyes grossly normal to inspection, PERRL and conjunctivae and sclerae normal  HENT: ear canals and TM's normal, nose and mouth without ulcers or lesions  NECK: no adenopathy, no asymmetry, masses, or scars and thyroid normal to palpation  RESP: lungs clear to auscultation - no rales, rhonchi or wheezes  CV: regular rate and rhythm, normal S1 S2, no S3 or S4, no murmur, click or rub, no peripheral edema and peripheral pulses strong  ABDOMEN: soft, nontender, no hepatosplenomegaly, no masses and bowel sounds normal  MS: no gross musculoskeletal defects noted, no edema  SKIN: no suspicious lesions or rashes  NEURO: Normal strength and tone, mentation intact and speech normal  BACK: no CVA tenderness, no paralumbar tenderness  PSYCH: mentation appears normal, affect normal/bright  LYMPH: no cervical, supraclavicular, axillary, or inguinal adenopathy        ASSESSMENT / PLAN:       ICD-10-CM    1. " "Healthcare maintenance  Z00.00 CBC with platelets     Comprehensive metabolic panel (BMP + Alb, Alk Phos, ALT, AST, Total. Bili, TP)     Lipid panel reflex to direct LDL Fasting     PSA, screen   2. Screening for prostate cancer  Z12.5 PSA, screen       Patient has been advised of split billing requirements and indicates understanding: Yes    COUNSELING:  Reviewed preventive health counseling, as reflected in patient instructions    Estimated body mass index is 31.07 kg/m  as calculated from the following:    Height as of this encounter: 1.784 m (5' 10.24\").    Weight as of this encounter: 98.9 kg (218 lb).    Weight management plan: Discussed healthy diet and exercise guidelines    He reports that he quit smoking about 50 years ago. His smoking use included cigarettes. He started smoking about 54 years ago. He has a 1.00 pack-year smoking history. He has never used smokeless tobacco.      Appropriate preventive services were discussed with this patient, including applicable screening as appropriate for cardiovascular disease, diabetes, osteopenia/osteoporosis, and glaucoma.  As appropriate for age/gender, discussed screening for colorectal cancer, prostate cancer, breast cancer, and cervical cancer. Checklist reviewing preventive services available has been given to the patient.    Reviewed patients plan of care and provided an AVS. The Basic Care Plan (routine screening as documented in Health Maintenance) for Dominguez meets the Care Plan requirement. This Care Plan has been established and reviewed with the Patient.    Counseling Resources:  ATP IV Guidelines  Pooled Cohorts Equation Calculator  Breast Cancer Risk Calculator  Breast Cancer: Medication to Reduce Risk  FRAX Risk Assessment  ICSI Preventive Guidelines  Dietary Guidelines for Americans, 2010  G[employer]ant[employer]'s MyPlate  ASA Prophylaxis  Lung CA Screening    Brent Dietz MD  [facility]    Identified Health Risks:  "

## 2022-10-10 DIAGNOSIS — G62.89 OTHER POLYNEUROPATHY: ICD-10-CM

## 2023-03-10 DIAGNOSIS — G62.89 OTHER POLYNEUROPATHY: ICD-10-CM

## 2023-07-21 DIAGNOSIS — I10 ESSENTIAL HYPERTENSION: ICD-10-CM

## 2023-07-21 RX ORDER — LISINOPRIL/HYDROCHLOROTHIAZIDE 10-12.5 MG
TABLET ORAL
Qty: 90 TABLET | Refills: 0 | Status: SHIPPED | OUTPATIENT
Start: 2023-07-21 | End: 2023-10-06

## 2023-08-18 ENCOUNTER — APPOINTMENT (OUTPATIENT)
Dept: URBAN - METROPOLITAN AREA CLINIC 255 | Age: 73
Setting detail: DERMATOLOGY
End: 2023-08-21

## 2023-08-18 DIAGNOSIS — L82.1 OTHER SEBORRHEIC KERATOSIS: ICD-10-CM

## 2023-08-18 DIAGNOSIS — L81.8 OTHER SPECIFIED DISORDERS OF PIGMENTATION: ICD-10-CM

## 2023-08-18 DIAGNOSIS — D18.0 HEMANGIOMA: ICD-10-CM

## 2023-08-18 DIAGNOSIS — T07XXXA INSECT BITE, NONVENOMOUS, OF OTHER, MULTIPLE, AND UNSPECIFIED SITES, WITHOUT MENTION OF INFECTION: ICD-10-CM

## 2023-08-18 DIAGNOSIS — D22 MELANOCYTIC NEVI: ICD-10-CM

## 2023-08-18 DIAGNOSIS — L72.0 EPIDERMAL CYST: ICD-10-CM

## 2023-08-18 DIAGNOSIS — D17 BENIGN LIPOMATOUS NEOPLASM: ICD-10-CM

## 2023-08-18 DIAGNOSIS — L81.4 OTHER MELANIN HYPERPIGMENTATION: ICD-10-CM

## 2023-08-18 PROBLEM — D18.01 HEMANGIOMA OF SKIN AND SUBCUTANEOUS TISSUE: Status: ACTIVE | Noted: 2023-08-18

## 2023-08-18 PROBLEM — T07.XXXA UNSPECIFIED MULTIPLE INJURIES, INITIAL ENCOUNTER: Status: ACTIVE | Noted: 2023-08-18

## 2023-08-18 PROBLEM — D22.5 MELANOCYTIC NEVI OF TRUNK: Status: ACTIVE | Noted: 2023-08-18

## 2023-08-18 PROBLEM — D17.21 BENIGN LIPOMATOUS NEOPLASM OF SKIN AND SUBCUTANEOUS TISSUE OF RIGHT ARM: Status: ACTIVE | Noted: 2023-08-18

## 2023-08-18 PROCEDURE — OTHER EXTRACTIONS: OTHER

## 2023-08-18 PROCEDURE — OTHER MIPS QUALITY: OTHER

## 2023-08-18 PROCEDURE — OTHER PRESCRIPTION: OTHER

## 2023-08-18 PROCEDURE — 99213 OFFICE O/P EST LOW 20 MIN: CPT

## 2023-08-18 PROCEDURE — OTHER COUNSELING: OTHER

## 2023-08-18 RX ORDER — TRIAMCINOLONE ACETONIDE 1 MG/G
CREAM TOPICAL BID
Qty: 30 | Refills: 1 | Status: ERX | COMMUNITY
Start: 2023-08-18

## 2023-08-18 ASSESSMENT — LOCATION SIMPLE DESCRIPTION DERM
LOCATION SIMPLE: RIGHT FOREHEAD
LOCATION SIMPLE: RIGHT SHOULDER
LOCATION SIMPLE: RIGHT UPPER BACK
LOCATION SIMPLE: UPPER BACK
LOCATION SIMPLE: RIGHT FOREARM

## 2023-08-18 ASSESSMENT — LOCATION DETAILED DESCRIPTION DERM
LOCATION DETAILED: SUPERIOR THORACIC SPINE
LOCATION DETAILED: RIGHT FOREHEAD
LOCATION DETAILED: INFERIOR THORACIC SPINE
LOCATION DETAILED: RIGHT SUPERIOR MEDIAL UPPER BACK
LOCATION DETAILED: RIGHT POSTERIOR SHOULDER
LOCATION DETAILED: RIGHT VENTRAL PROXIMAL FOREARM

## 2023-08-18 ASSESSMENT — LOCATION ZONE DERM
LOCATION ZONE: TRUNK
LOCATION ZONE: ARM
LOCATION ZONE: FACE

## 2023-08-18 NOTE — PROCEDURE: EXTRACTIONS
Acne Type: Comedonal Lesions
Prep Text (Optional): Prior to removal the treatment areas were prepped in the usual fashion.
Detail Level: Detailed
Render Post-Care Instructions In Note?: no
Post-Care Instructions: I reviewed with the patient in detail post-care instructions. Patient is to keep the treatment areas dry overnight, and then apply bacitracin twice daily until healed. Patient may apply hydrogen peroxide soaks to remove any crusting.
Extraction Method: 22 gauge needle
Consent was obtained and risks were reviewed including but not limited to scarring, infection, bleeding, scabbing, incomplete removal, and allergy to anesthesia.

## 2023-08-18 NOTE — PROCEDURE: MIPS QUALITY
Quality 110: Preventive Care And Screening: Influenza Immunization: Influenza Immunization previously received during influenza season
Quality 111:Pneumonia Vaccination Status For Older Adults: Patient received any pneumococcal conjugate or polysaccharide vaccine on or after their 60th birthday and before the end of the measurement period
Detail Level: Detailed
Quality 226: Preventive Care And Screening: Tobacco Use: Screening And Cessation Intervention: Patient screened for tobacco use and is an ex/non-smoker
Quality 130: Documentation Of Current Medications In The Medical Record: Current Medications Documented
Quality 431: Preventive Care And Screening: Unhealthy Alcohol Use - Screening: Patient identified as an unhealthy alcohol user when screened for unhealthy alcohol use using a systematic screening method and received brief counseling

## 2023-09-20 ENCOUNTER — OFFICE VISIT (OUTPATIENT)
Dept: FAMILY MEDICINE | Facility: CLINIC | Age: 73
End: 2023-09-20
Payer: COMMERCIAL

## 2023-09-20 ENCOUNTER — NURSE TRIAGE (OUTPATIENT)
Dept: FAMILY MEDICINE | Facility: CLINIC | Age: 73
End: 2023-09-20

## 2023-09-20 VITALS
WEIGHT: 214.2 LBS | DIASTOLIC BLOOD PRESSURE: 68 MMHG | OXYGEN SATURATION: 96 % | SYSTOLIC BLOOD PRESSURE: 100 MMHG | TEMPERATURE: 98.7 F | BODY MASS INDEX: 29.99 KG/M2 | RESPIRATION RATE: 21 BRPM | HEART RATE: 92 BPM | HEIGHT: 71 IN

## 2023-09-20 DIAGNOSIS — E86.0 MILD DEHYDRATION: ICD-10-CM

## 2023-09-20 DIAGNOSIS — R06.02 SHORTNESS OF BREATH: Primary | ICD-10-CM

## 2023-09-20 LAB
ANION GAP SERPL CALCULATED.3IONS-SCNC: 11 MMOL/L (ref 7–15)
BUN SERPL-MCNC: 20.1 MG/DL (ref 8–23)
CALCIUM SERPL-MCNC: 9.5 MG/DL (ref 8.8–10.2)
CHLORIDE SERPL-SCNC: 104 MMOL/L (ref 98–107)
CREAT SERPL-MCNC: 0.89 MG/DL (ref 0.67–1.17)
DEPRECATED HCO3 PLAS-SCNC: 23 MMOL/L (ref 22–29)
EGFRCR SERPLBLD CKD-EPI 2021: >90 ML/MIN/1.73M2
ERYTHROCYTE [DISTWIDTH] IN BLOOD BY AUTOMATED COUNT: 13.9 % (ref 10–15)
GLUCOSE SERPL-MCNC: 91 MG/DL (ref 70–99)
HCT VFR BLD AUTO: 36.9 % (ref 40–53)
HGB BLD-MCNC: 12 G/DL (ref 13.3–17.7)
MCH RBC QN AUTO: 29.9 PG (ref 26.5–33)
MCHC RBC AUTO-ENTMCNC: 32.5 G/DL (ref 31.5–36.5)
MCV RBC AUTO: 92 FL (ref 78–100)
PLATELET # BLD AUTO: 307 10E3/UL (ref 150–450)
POTASSIUM SERPL-SCNC: 4.8 MMOL/L (ref 3.4–5.3)
RBC # BLD AUTO: 4.02 10E6/UL (ref 4.4–5.9)
SODIUM SERPL-SCNC: 138 MMOL/L (ref 136–145)
WBC # BLD AUTO: 6 10E3/UL (ref 4–11)

## 2023-09-20 PROCEDURE — 85027 COMPLETE CBC AUTOMATED: CPT | Performed by: FAMILY MEDICINE

## 2023-09-20 PROCEDURE — 80048 BASIC METABOLIC PNL TOTAL CA: CPT | Performed by: FAMILY MEDICINE

## 2023-09-20 PROCEDURE — 36415 COLL VENOUS BLD VENIPUNCTURE: CPT | Performed by: FAMILY MEDICINE

## 2023-09-20 PROCEDURE — 99214 OFFICE O/P EST MOD 30 MIN: CPT | Performed by: FAMILY MEDICINE

## 2023-09-20 PROCEDURE — 93000 ELECTROCARDIOGRAM COMPLETE: CPT | Performed by: FAMILY MEDICINE

## 2023-09-20 ASSESSMENT — PAIN SCALES - GENERAL: PAINLEVEL: NO PAIN (0)

## 2023-09-20 NOTE — TELEPHONE ENCOUNTER
Nurse Triage SBAR    Is this a 2nd Level Triage? NO    Situation: Pt was painting outside yesterday, was not wearing a mask (water based paint). He stayed well hydrated with electrolytes. Now he is having mild difficulty breathing with exertion like going up stairs, never had this before. There is also lightheadedness as well. At rest he is fine.    Background: Comes and goes. Sitting right now and breathing normal. He does have to take a deep breath. He denies cardiac or lung history. Concerned about the painting yesterday. This is the only thing pt can think of. Right after the painting this started. Pt has been taking his BP this mornin/67, 126/74, 130/71, 121/80. He states his pulse was jumping around from . Denies palpitations but feels maybe his heart is fast? He cannot tell. Denies chest pain.    Assessment: see below    Protocol Recommended Disposition:   Go To Office Now    Recommendation: Pt wants someone to listen to his chest. At this time he is scheduled with Dr. Ravi in person. Is pt OK to keep this visit?    Routed to provider    Does the patient meet one of the following criteria for ADS visit consideration? 16+ years old, with an [facility] PCP     TIP  Providers, please consider if this condition is appropriate for management at one of our Acute and Diagnostic Services sites.     If patient is a good candidate, please use dotphrase <dot>triageresponse and select Refer to ADS to document.    OK to leave a detailed vm.    Reason for Disposition   MILD difficulty breathing (e.g., minimal/no SOB at rest, SOB with walking, pulse < 100) of new-onset or worse than normal    Additional Information   Negative: SEVERE difficulty breathing (e.g., struggling for each breath, speaks in single words, pulse > 120)   Negative: Breathing stopped and hasn't returned   Negative: Choking on something   Negative: Bluish (or gray) lips or face   Negative: Difficult to awaken or acting confused (e.g.,  "disoriented, slurred speech)   Negative: Passed out (i.e., fainted, collapsed and was not responding)   Negative: Wheezing started suddenly after medicine, an allergic food, or bee sting   Negative: Stridor (harsh sound while breathing in)   Negative: Slow, shallow and weak breathing   Negative: Sounds like a life-threatening emergency to the triager   Negative: Chest pain   Negative: Wheezing (high pitched whistling sound) and previous asthma attacks or use of asthma medicines   Negative: Difficulty breathing and within 14 days of COVID-19 Exposure   Negative: Difficulty breathing and only present when coughing   Negative: Difficulty breathing and only from stuffy nose   Negative: Difficulty breathing and only from stuffy nose or runny nose from common cold   Negative: MODERATE difficulty breathing (e.g., speaks in phrases, SOB even at rest, pulse 100-120) of new-onset or worse than normal   Negative: Oxygen level (e.g., pulse oximetry) 90% or lower   Negative: Wheezing can be heard across the room   Negative: Drooling or spitting out saliva (because can't swallow)   Negative: Any history of prior \"blood clot\" in leg or lungs   Negative: Illness requiring prolonged bedrest in past month (e.g., immobilization, long hospital stay)   Negative: Hip or leg fracture (broken bone) in past month (or had cast on leg or ankle in past month)   Negative: Major surgery in the past month   Negative: Long-distance travel in past month (e.g., car, bus, train, plane; with trip lasting 6 or more hours)   Negative: Cancer treatment in past six months (or has cancer now)   Negative: Extra heartbeats, irregular heart beating, or heart is beating very fast (i.e., 'palpitations')   Negative: Fever > 103 F (39.4 C)   Negative: Fever > 101 F (38.3 C) and over 60 years of age   Negative: Fever > 100.0 F (37.8 C) and bedridden (e.g., nursing home patient, stroke, chronic illness, recovering from surgery)   Negative: Fever > 100.0 F (37.8 C) " "and diabetes mellitus or weak immune system (e.g., HIV positive, cancer chemo, splenectomy, organ transplant, chronic steroids)   Negative: Periods where breathing stops and then resumes normally and bedridden (e.g., nursing home patient, CVA)   Negative: Pregnant or postpartum (from 0 to 6 weeks after delivery)   Negative: Patient sounds very sick or weak to the triager    Answer Assessment - Initial Assessment Questions  1. RESPIRATORY STATUS: \"Describe your breathing?\" (e.g., wheezing, shortness of breath, unable to speak, severe coughing)         Pt was painting outside yesterday, was not wearing a mask (water based paint). He stayed well hydrated and electrolytes. Now he is having difficulty breathing with exertion like going up stairs, never had this before. There is also lightheadedness as well. At rest he is fine.     2. ONSET: \"When did this breathing problem begin?\"         Started after painting last evening.     3. PATTERN \"Does the difficult breathing come and go, or has it been constant since it started?\"         Comes and goes. Sitting right now and breathing normal. He does have to take a deep breath.    4. SEVERITY: \"How bad is your breathing?\" (e.g., mild, moderate, severe)     - MILD: No SOB at rest, mild SOB with walking, speaks normally in sentences, can lie down, no retractions, pulse < 100.     - MODERATE: SOB at rest, SOB with minimal exertion and prefers to sit, cannot lie down flat, speaks in phrases, mild retractions, audible wheezing, pulse 100-120.     - SEVERE: Very SOB at rest, speaks in single words, struggling to breathe, sitting hunched forward, retractions, pulse > 120         Mild    5. RECURRENT SYMPTOM: \"Have you had difficulty breathing before?\" If Yes, ask: \"When was the last time?\" and \"What happened that time?\"         Never had before    6. CARDIAC HISTORY: \"Do you have any history of heart disease?\" (e.g., heart attack, angina, bypass surgery, angioplasty)         No    7. " "LUNG HISTORY: \"Do you have any history of lung disease?\"  (e.g., pulmonary embolus, asthma, emphysema)        No    8. CAUSE: \"What do you think is causing the breathing problem?\"         Concerned about the painting yesterday. This is the only thing pt can think of. Right after the painting this started.     9. OTHER SYMPTOMS: \"Do you have any other symptoms? (e.g., dizziness, runny nose, cough, chest pain, fever)        Lightheadedness.    10. O2 SATURATION MONITOR:  \"Do you use an oxygen saturation monitor (pulse oximeter) at home?\" If Yes, ask: \"What is your reading (oxygen level) today?\" \"What is your usual oxygen saturation reading?\" (e.g., 95%)          No. Pt did check BP and pulse. Now machine is giving him an error message. Denies palpitations but thinks maybe heart feels fast? He cannot tell    11. PREGNANCY: \"Is there any chance you are pregnant?\" \"When was your last menstrual period?\"          NA    12. TRAVEL: \"Have you traveled out of the country in the last month?\" (e.g., travel history, exposures)          No    Protocols used: Breathing Difficulty-A-OH    GO TO OFFICE NOW: * You need to be examined. Come into the office right now. * IF NO AVAILABLE APPOINTMENTS:  You need to be seen in an Urgent Care Center. Leave now. A nearby Urgent Care Center is often a good source of care. Another choice is to go to the Emergency Department.  Patient/Caregiver understands and will follow care advice? Other, see rodriguez MAI RN  [facility]   "

## 2023-09-20 NOTE — LETTER
September 22, 2023      DENVER Pittman  [address] [facility]  [place] [address]        Dear ,      I have reviewed your recent labs. Here are the results:     -Kidney function is normal (Cr, GFR), Sodium is normal, Potassium is normal, Calcium is normal, Glucose is normal.   -Complete blood count indicate overall stable but slightly low hemoglobin.  Please discuss that with your primary at your upcoming visit.     Resulted Orders   CBC with platelets   Result Value Ref Range    WBC Count 6.0 4.0 - 11.0 10e3/uL    RBC Count 4.02 (L) 4.40 - 5.90 10e6/uL    Hemoglobin 12.0 (L) 13.3 - 17.7 g/dL    Hematocrit 36.9 (L) 40.0 - 53.0 %    MCV 92 78 - 100 fL    MCH 29.9 26.5 - 33.0 pg    MCHC 32.5 31.5 - 36.5 g/dL    RDW 13.9 10.0 - 15.0 %    Platelet Count 307 150 - 450 10e3/uL    Narrative    Reviewed, OK with previous.    Basic metabolic panel  (Ca, Cl, CO2, Creat, Gluc, K, Na, BUN)   Result Value Ref Range    Sodium 138 136 - 145 mmol/L    Potassium 4.8 3.4 - 5.3 mmol/L    Chloride 104 98 - 107 mmol/L    Carbon Dioxide (CO2) 23 22 - 29 mmol/L    Anion Gap 11 7 - 15 mmol/L    Urea Nitrogen 20.1 8.0 - 23.0 mg/dL    Creatinine 0.89 0.67 - 1.17 mg/dL    Calcium 9.5 8.8 - 10.2 mg/dL    Glucose 91 70 - 99 mg/dL    GFR Estimate >90 >60 mL/min/1.73m2       If you have any questions or concerns, please call the clinic at the number listed above.       Sincerely,      Ashok Ravi MD

## 2023-09-20 NOTE — TELEPHONE ENCOUNTER
Dr. Ravi informed nurse it was okay to keep appt as scheduled this afternoon. Reached out to pt to notify of this information. He stated understanding and agreement.     Julieth Johns RN

## 2023-09-20 NOTE — PROGRESS NOTES
"  Assessment & Plan     Shortness of breath  EKG reviewed which is completely normal we will get some blood work follow-up on that most likely some exertion related to fatigue .  Lungs and heart exam is normal there is no exertional chest pain associated with that.  We talked about if this continues despite rest and hydration further evaluation would be appropriate in terms of ruling out any other issue.  He will notify us if any change in symptoms.  We also talked about if any chest pain associated with that or any sweating episode he needs to go to ER for further evaluation.  - EKG 12-lead complete w/read - Clinics  - CBC with platelets; Future  - Basic metabolic panel  (Ca, Cl, CO2, Creat, Gluc, K, Na, BUN); Future  - CBC with platelets  - Basic metabolic panel  (Ca, Cl, CO2, Creat, Gluc, K, Na, BUN)    Mild dehydration  Increase hydration rest do not do any exertional activity over the next few days and see if and symptoms improve if no improvement or worsening needs to notify us immediately.       BMI:   Estimated body mass index is 29.87 kg/m  as calculated from the following:    Height as of this encounter: 1.803 m (5' 11\").    Weight as of this encounter: 97.2 kg (214 lb 3.2 oz).       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  Apparently 2 days ago he was outside doing painting for prolonged period of time almost 8 to 9 hours.  In the past he has done exercise in the gym without any exertional symptoms.  Denies any headaches.  Complains of some degree of tiredness for the last 2 days.  Still there is no exertional chest pain no radiation of pain.      9/20/2023     3:25 PM   Additional Questions   Roomed by marc " "      History of Present Illness       Reason for visit:  Breathing  Symptom onset:  1-3 days ago    He eats 2-3 servings of fruits and vegetables daily.He exercises with enough effort to increase his heart rate 60 or more minutes per day.  He exercises with enough effort to increase his heart rate 3 or less days per week.   He is taking medications regularly.        Review of Systems   Constitutional, HEENT, cardiovascular, pulmonary, gi and gu systems are negative, except as otherwise noted.      Objective    /68   Pulse 92   Temp 98.7  F (37.1  C) (Temporal)   Resp 21   Ht 1.803 m (5' 11\")   Wt 97.2 kg (214 lb 3.2 oz)   SpO2 96%   BMI 29.87 kg/m    Body mass index is 29.87 kg/m .  Physical Exam   GENERAL: healthy, alert and no distress  NECK: no adenopathy, no asymmetry, masses, or scars and thyroid normal to palpation  RESP: lungs clear to auscultation - no rales, rhonchi or wheezes  CV: regular rate and rhythm, normal S1 S2, no S3 or S4, no murmur, click or rub, no peripheral edema and peripheral pulses strong  ABDOMEN: soft, nontender, no hepatosplenomegaly, no masses and bowel sounds normal  MS: no gross musculoskeletal defects noted, no edema              "

## 2023-09-29 ASSESSMENT — ENCOUNTER SYMPTOMS
FEVER: 0
NAUSEA: 0
PARESTHESIAS: 0
HEADACHES: 0
PALPITATIONS: 0
DYSURIA: 0
DIARRHEA: 0
EYE PAIN: 0
HEMATOCHEZIA: 0
JOINT SWELLING: 0
HEMATURIA: 0
ARTHRALGIAS: 0
COUGH: 0
FREQUENCY: 1
SORE THROAT: 0
SHORTNESS OF BREATH: 1
CONSTIPATION: 0
CHILLS: 0
WEAKNESS: 0
DIZZINESS: 0
HEARTBURN: 0
ABDOMINAL PAIN: 0
MYALGIAS: 0
NERVOUS/ANXIOUS: 0

## 2023-09-29 ASSESSMENT — ACTIVITIES OF DAILY LIVING (ADL): CURRENT_FUNCTION: NO ASSISTANCE NEEDED

## 2023-10-06 ENCOUNTER — OFFICE VISIT (OUTPATIENT)
Dept: FAMILY MEDICINE | Facility: CLINIC | Age: 73
End: 2023-10-06
Payer: COMMERCIAL

## 2023-10-06 ENCOUNTER — PATIENT OUTREACH (OUTPATIENT)
Dept: ONCOLOGY | Facility: CLINIC | Age: 73
End: 2023-10-06

## 2023-10-06 VITALS
WEIGHT: 211.6 LBS | RESPIRATION RATE: 12 BRPM | BODY MASS INDEX: 30.29 KG/M2 | HEIGHT: 70 IN | TEMPERATURE: 97.3 F | HEART RATE: 74 BPM | OXYGEN SATURATION: 97 % | SYSTOLIC BLOOD PRESSURE: 128 MMHG | DIASTOLIC BLOOD PRESSURE: 70 MMHG

## 2023-10-06 DIAGNOSIS — I10 ESSENTIAL HYPERTENSION: ICD-10-CM

## 2023-10-06 DIAGNOSIS — Z13.6 ENCOUNTER FOR ABDOMINAL AORTIC ANEURYSM (AAA) SCREENING: ICD-10-CM

## 2023-10-06 DIAGNOSIS — E78.5 HYPERLIPIDEMIA LDL GOAL <130: ICD-10-CM

## 2023-10-06 DIAGNOSIS — D50.9 MICROCYTIC ANEMIA: ICD-10-CM

## 2023-10-06 DIAGNOSIS — Z12.5 SCREENING FOR PROSTATE CANCER: ICD-10-CM

## 2023-10-06 DIAGNOSIS — Z00.01 ENCOUNTER FOR GENERAL ADULT MEDICAL EXAMINATION WITH ABNORMAL FINDINGS: Primary | ICD-10-CM

## 2023-10-06 PROCEDURE — 90662 IIV NO PRSV INCREASED AG IM: CPT | Performed by: FAMILY MEDICINE

## 2023-10-06 PROCEDURE — G0439 PPPS, SUBSEQ VISIT: HCPCS | Performed by: FAMILY MEDICINE

## 2023-10-06 PROCEDURE — G0008 ADMIN INFLUENZA VIRUS VAC: HCPCS | Performed by: FAMILY MEDICINE

## 2023-10-06 RX ORDER — ATORVASTATIN CALCIUM 20 MG/1
20 TABLET, FILM COATED ORAL DAILY
Qty: 90 TABLET | Refills: 3 | Status: SHIPPED | OUTPATIENT
Start: 2023-10-06

## 2023-10-06 RX ORDER — LISINOPRIL/HYDROCHLOROTHIAZIDE 10-12.5 MG
1 TABLET ORAL DAILY
Qty: 90 TABLET | Refills: 3 | Status: SHIPPED | OUTPATIENT
Start: 2023-10-06

## 2023-10-06 ASSESSMENT — ENCOUNTER SYMPTOMS
DYSURIA: 0
DIARRHEA: 0
EYE PAIN: 0
CONSTIPATION: 0
HEADACHES: 0
PARESTHESIAS: 0
MYALGIAS: 0
FEVER: 0
DIZZINESS: 0
NERVOUS/ANXIOUS: 0
ARTHRALGIAS: 0
HEARTBURN: 0
HEMATURIA: 0
HEMATOCHEZIA: 0
SORE THROAT: 0
ABDOMINAL PAIN: 0
CHILLS: 0
SHORTNESS OF BREATH: 1
PALPITATIONS: 0
COUGH: 0
JOINT SWELLING: 0
FREQUENCY: 1
NAUSEA: 0
WEAKNESS: 0

## 2023-10-06 ASSESSMENT — ACTIVITIES OF DAILY LIVING (ADL): CURRENT_FUNCTION: NO ASSISTANCE NEEDED

## 2023-10-06 ASSESSMENT — PAIN SCALES - GENERAL: PAINLEVEL: NO PAIN (0)

## 2023-10-06 NOTE — PATIENT INSTRUCTIONS
Patient Education   Personalized Prevention Plan  You are due for the preventive services outlined below.  Your care team is available to assist you in scheduling these services.  If you have already completed any of these items, please share that information with your care team to update in your medical record.  Health Maintenance Due   Topic Date Due     Pneumococcal Vaccine (1 - PCV) Never done     AORTIC ANEURYSM SCREENING (SYSTEM ASSIGNED)  Never done     Flu Vaccine (1) 09/01/2023     COVID-19 Vaccine (5 - 2023-24 season) 09/01/2023     ANNUAL REVIEW OF HM ORDERS  10/04/2023     Hearing Loss: Care Instructions  Overview     Hearing loss is a sudden or slow decrease in how well you hear. It can range from slight to profound. Permanent hearing loss can occur with aging. It also can happen when you are exposed long-term to loud noise. Examples include listening to loud music, riding motorcycles, or being around other loud machines.  Hearing loss can affect your work and home life. It can make you feel lonely or depressed. You may feel that you have lost your independence. But hearing aids and other devices can help you hear better and feel connected to others.  Follow-up care is a key part of your treatment and safety. Be sure to make and go to all appointments, and call your doctor if you are having problems. It's also a good idea to know your test results and keep a list of the medicines you take.  How can you care for yourself at home?  Avoid loud noises whenever possible. This helps keep your hearing from getting worse.  Always wear hearing protection around loud noises.  Wear a hearing aid as directed.  A professional can help you pick a hearing aid that will work best for you.  You can also get hearing aids over the counter for mild to moderate hearing loss.  Have hearing tests as your doctor suggests. They can show whether your hearing has changed. Your hearing aid may need to be adjusted.  Use other  "devices as needed. These may include:  Telephone amplifiers and hearing aids that can connect to a television, stereo, radio, or microphone.  Devices that use lights or vibrations. These alert you to the doorbell, a ringing telephone, or a baby monitor.  Television closed-captioning. This shows the words at the bottom of the screen. Most new TVs can do this.  TTY (text telephone). This lets you type messages back and forth on the telephone instead of talking or listening. These devices are also called TDD. When messages are typed on the keyboard, they are sent over the phone line to a receiving TTY. The message is shown on a monitor.  Use text messaging, social media, and email if it is hard for you to communicate by telephone.  Try to learn a listening technique called speechreading. It is not lipreading. You pay attention to people's gestures, expressions, posture, and tone of voice. These clues can help you understand what a person is saying. Face the person you are talking to, and have them face you. Make sure the lighting is good. You need to see the other person's face clearly.  Think about counseling if you need help to adjust to your hearing loss.  When should you call for help?  Watch closely for changes in your health, and be sure to contact your doctor if:    You think your hearing is getting worse.     You have new symptoms, such as dizziness or nausea.   Where can you learn more?  Go to https://www.[employer].net/patiented  Enter R798 in the search box to learn more about \"Hearing Loss: Care Instructions.\"  Current as of: March 1, 2023               Content Version: 13.7    0534-0348 [employer].   Care instructions adapted under license by your healthcare professional. If you have questions about a medical condition or this instruction, always ask your healthcare professional. [employer] disclaims any warranty or liability for your use of this information.         "

## 2023-10-06 NOTE — PROGRESS NOTES
Hematology referral reviewed for [facility], see below.    Referral reason: Microcytic anemia    Current abnormal labs: Available in Chart Review    Outreach: Call not placed to patient regarding referral.    Plan: Triage instructions updated and sent to [facility] for completion.

## 2023-10-06 NOTE — PROGRESS NOTES
"The patient was provided with written information regarding signs of hearing loss.Answers submitted by the patient for this visit:  Annual Preventive Visit (Submitted on 9/29/2023)  Chief Complaint: Annual Exam:  In general, how would you rate your overall physical health?: good  Frequency of exercise:: 2-3 days/week  Do you usually eat at least 4 servings of fruit and vegetables a day, include whole grains & fiber, and avoid regularly eating high fat or \"junk\" foods? : Yes  Taking medications regularly:: Yes  Medication side effects:: None  Activities of Daily Living: no assistance needed  Home safety: no safety concerns identified  Hearing Impairment:: difficulty following a conversation in a noisy restaurant or crowded room, no hearing concerns  In the past 6 months, have you been bothered by leaking of urine?: No  abdominal pain: No  Blood in stool: No  Blood in urine: No  chest pain: No  chills: No  congestion: No  constipation: No  cough: No  diarrhea: No  dizziness: No  ear pain: No  eye pain: No  nervous/anxious: No  fever: No  frequency: Yes  genital sores: No  headaches: No  hearing loss: No  heartburn: No  arthralgias: No  joint swelling: No  peripheral edema: No  mood changes: No  myalgias: No  nausea: No  dysuria: No  palpitations: No  Skin sensation changes: No  sore throat: No  urgency: No  rash: No  shortness of breath: Yes  visual disturbance: No  weakness: No  impotence: Yes  penile discharge: No  In general, how would you rate your overall mental or emotional health?: good  Additional concerns today:: No  Exercise outside of work (Submitted on 9/29/2023)  Chief Complaint: Annual Exam:  Duration of exercise:: Greater than 60 minutes    "

## 2023-10-06 NOTE — PROGRESS NOTES
"SUBJECTIVE:   DENVER is a 73 year old who presents for Preventive Visit.      10/6/2023     9:25 AM   Additional Questions   Roomed by marc       Are you in the first 12 months of your Medicare coverage?  No    Healthy Habits:     In general, how would you rate your overall health?  Good    Frequency of exercise:  2-3 days/week    Duration of exercise:  Greater than 60 minutes    Do you usually eat at least 4 servings of fruit and vegetables a day, include whole grains    & fiber and avoid regularly eating high fat or \"junk\" foods?  Yes    Taking medications regularly:  Yes    Medication side effects:  None    Ability to successfully perform activities of daily living:  No assistance needed    Home Safety:  No safety concerns identified    Hearing Impairment:  Difficulty following a conversation in a noisy restaurant or crowded room and no hearing concerns    In the past 6 months, have you been bothered by leaking of urine?  No    In general, how would you rate your overall mental or emotional health?  Good    Additional concerns today:  No      Today's PHQ-2 Score:       10/6/2023     9:06 AM   PHQ-2 ( 1999 [employer])   Q1: Little interest or pleasure in doing things 0   Q2: Feeling down, depressed or hopeless 0   PHQ-2 Score 0   Q1: Little interest or pleasure in doing things Not at all   Q2: Feeling down, depressed or hopeless Not at all   PHQ-2 Score 0           Have you ever done Advance Care Planning? (For example, a Health Directive, POLST, or a discussion with a medical provider or your loved ones about your wishes): Yes, patient states has an Advance Care Planning document and will bring a copy to the clinic.       Fall risk  Fallen 2 or more times in the past year?: No  Any fall with injury in the past year?: No    Cognitive Screening   1) Repeat 3 items (Leader, Season, Table)    2) Clock draw: {NORMAL  3) 3 item recall: Recalls 3 objects  Results: 3 items recalled: COGNITIVE IMPAIRMENT LESS " LIKELY    Mini-CogTM Copyright LAKHWINDER Pagan. Licensed by the author for use in [facility]; reprinted with permission (jazzmine@[facility]). All rights reserved.      Do you have sleep apnea, excessive snoring or daytime drowsiness? : no    Reviewed and updated as needed this visit by clinical staff   Tobacco  Allergies  Meds              Reviewed and updated as needed this visit by Provider                 Social History     Tobacco Use    Smoking status: Former     Packs/day: 0.50     Years: 2.00     Pack years: 1.00     Types: Cigarettes     Start date: 1968     Quit date: 1972     Years since quittin.7    Smokeless tobacco: Never   Substance Use Topics    Alcohol use: Yes     Alcohol/week: 10.0 standard drinks of alcohol     Comment: 3times/week             2023     1:56 PM   Alcohol Use   Prescreen: >3 drinks/day or >7 drinks/week? Yes   AUDIT SCORE  6         2023     1:56 PM   AUDIT - Alcohol Use Disorders Identification Test - Reproduced from the World Health Organization Audit 2001 (Second Edition)   1.  How often do you have a drink containing alcohol? 2 to 3 times a week   2.  How many drinks containing alcohol do you have on a typical day when you are drinking? 1 or 2   3.  How often do you have five or more drinks on one occasion? Monthly   4.  How often during the last year have you found that you were not able to stop drinking once you had started? Never   5.  How often during the last year have you failed to do what was normally expected of you because of drinking? Never   6.  How often during the last year have you needed a first drink in the morning to get yourself going after a heavy drinking session? Never   7.  How often during the last year have you had a feeling of guilt or remorse after drinking? Less than monthly   8.  How often during the last year have you been unable to remember what happened the night before because of your drinking? Never   9.  Have you or  someone else been injured because of your drinking? No   10. Has a relative, friend, doctor or other health care worker been concerned about your drinking or suggested you cut down? No   TOTAL SCORE 6     Do you have a current opioid prescription? No  Do you use any other controlled substances or medications that are not prescribed by a provider? None        Current providers sharing in care for this patient include:   Patient Care Team:  Brent Dietz MD as PCP - General (Family Practice)  Brent Dietz MD as Assigned PCP    The following health maintenance items are reviewed in Epic and correct as of today:  Health Maintenance   Topic Date Due    Pneumococcal Vaccine: 65+ Years (1 - PCV) Never done    AORTIC ANEURYSM SCREENING (SYSTEM ASSIGNED)  Never done    INFLUENZA VACCINE (1) 09/01/2023    COVID-19 Vaccine (5 - 2023-24 season) 09/01/2023    ANNUAL REVIEW OF HM ORDERS  10/04/2023    MEDICARE ANNUAL WELLNESS VISIT  10/04/2023    DTAP/TDAP/TD IMMUNIZATION (2 - Td or Tdap) 05/01/2024    FALL RISK ASSESSMENT  10/06/2024    COLORECTAL CANCER SCREENING  08/01/2027    LIPID  10/04/2027    ADVANCE CARE PLANNING  10/04/2027    PHQ-2 (once per calendar year)  Completed    ZOSTER IMMUNIZATION  Completed    IPV IMMUNIZATION  Aged Out    HPV IMMUNIZATION  Aged Out    MENINGITIS IMMUNIZATION  Aged Out    HEPATITIS C SCREENING  Discontinued     BP Readings from Last 3 Encounters:   10/06/23 128/70   09/20/23 100/68   10/04/22 134/70    Wt Readings from Last 3 Encounters:   10/06/23 96 kg (211 lb 9.6 oz)   09/20/23 97.2 kg (214 lb 3.2 oz)   10/04/22 98.9 kg (218 lb)                  Patient Active Problem List   Diagnosis    Arthritis    Varicose veins of legs    HYPERLIPIDEMIA LDL GOAL <130    Advance care planning    Erectile dysfunction    Ganglion cyst    Throat clearing    Laceration    Head injury    Open wound of scalp    Left inguinal hernia    Plantar warts    PAC (premature atrial contraction)     Past Surgical  History:   Procedure Laterality Date    ARTHRODESIS WRIST Left 2016    Procedure: ARTHRODESIS WRIST;  Surgeon: Mayda Teresa MD;  Location: [facility]    ARTHRODESIS WRIST Left 2016    Procedure: ARTHRODESIS WRIST;  Surgeon: Mayda Teresa MD;  Location: [facility]    COLONOSCOPY  2012    Procedure: COLONOSCOPY;  COLONOSCOPY;  Surgeon: Kris Garcia MD;  Location:  GI    COLONOSCOPY N/A 2017    Procedure: COLONOSCOPY;  COLONOSCOPY;  Surgeon: Emil Plaza MD;  Location:  GI    ENDOSCOPIC RELEASE CARPAL TUNNEL Left 2016    Procedure: ENDOSCOPIC RELEASE CARPAL TUNNEL;  Surgeon: Mayda Teresa MD;  Location: [facility]    EYE SURGERY  2016    JOINT REPLACEMTN, KNEE RT/LT  2007    Joint Replacement knee LT, right hip replacment     REMOVE HARDWARE WRIST Left 2016    Procedure: REMOVE HARDWARE WRIST;  Surgeon: Mayda Teresa MD;  Location: [facility]    SOFT TISSUE SURGERY  2019    SURGICAL HISTORY OF -       arthroscopyx 3    SURGICAL HISTORY OF -       lt cholesteotoma    ZZC NONSPECIFIC PROCEDURE      diskectomy L5s       Social History     Tobacco Use    Smoking status: Former     Packs/day: 0.50     Years: 2.00     Pack years: 1.00     Types: Cigarettes     Start date: 1968     Quit date: 1972     Years since quittin.7    Smokeless tobacco: Never   Substance Use Topics    Alcohol use: Yes     Alcohol/week: 10.0 standard drinks of alcohol     Comment: 3times/week     Family History   Problem Relation Age of Onset    Hyperlipidemia Father     C.A.D. Paternal Grandfather         MI         Current Outpatient Medications   Medication Sig Dispense Refill    amitriptyline (ELAVIL) 25 MG tablet TAKE 1 TO 2 TABLETS BY MOUTH AT BEDTIME 180 tablet 1    ASPIRIN PO Take 81 mg by mouth daily      atorvastatin (LIPITOR) 20 MG tablet Take 1 tablet (20 mg) by mouth daily 90 tablet 3    brimonidine (ALPHAGAN) 0.2 %  ophthalmic solution PLACE 1 DROP INTO INTO LEFT EYE TWICE A DAY      fish oil-omega-3 fatty acids 1000 MG capsule Take 2 capsules by mouth daily. 180 capsule 12    GLUCOSAMINE SULFATE PO Take 1,500 mg by mouth daily.        lisinopril-hydrochlorothiazide (ZESTORETIC) 10-12.5 MG tablet Take 1 tablet by mouth daily 90 tablet 3    MULTI-VITAMIN OR TABS 1 q day   0    prednisoLONE acetate (PRED FORTE) 1 % ophthalmic suspension INSTILL 1 (ONE) DROP INTO LEFT EYE 4 TIMES DAILY      trimethoprim-polymyxin b (POLYTRIM) 43551-8.1 UNIT/ML-% ophthalmic solution INSTILL 1 (ONE) DROP INTO LEFT EYE 4 TIMES DAILY      VITAMIN D, CHOLECALCIFEROL, PO Take by mouth daily       Allergies   Allergen Reactions    Penicillins Hives     Recent Labs   Lab Test 09/20/23  1613 10/04/22  0933 09/03/21  1108 09/03/21  1108 03/11/21  1116 12/07/20  1102 08/24/20  1005 10/25/19  0944   LDL  --  105*  --  125*  --   --  124*  --    HDL  --  49  --  52  --   --  56  --    TRIG  --  52  --  49  --   --  113  --    ALT  --  23  --  27  --   --   --  27   CR 0.89 0.75   < > 1.01 0.81 0.84 0.85 0.88   GFRESTIMATED >90 >90   < > 75 >90 89 89 88   GFRESTBLACK  --   --   --   --  >90 >90 >90 >90   POTASSIUM 4.8 5.1   < > 4.8 4.6 4.8 4.7 4.2    < > = values in this interval not displayed.              Review of Systems   Constitutional:  Negative for chills and fever.   HENT:  Negative for congestion, ear pain, hearing loss and sore throat.    Eyes:  Negative for pain and visual disturbance.   Respiratory:  Positive for shortness of breath. Negative for cough.    Cardiovascular:  Negative for chest pain, palpitations and peripheral edema.   Gastrointestinal:  Negative for abdominal pain, constipation, diarrhea, heartburn, hematochezia and nausea.   Genitourinary:  Positive for frequency and impotence. Negative for dysuria, genital sores, hematuria, penile discharge and urgency.   Musculoskeletal:  Negative for arthralgias, joint swelling and myalgias.  "  Skin:  Negative for rash.   Neurological:  Negative for dizziness, weakness, headaches and paresthesias.   Psychiatric/Behavioral:  Negative for mood changes. The patient is not nervous/anxious.          OBJECTIVE:   /70   Pulse 74   Temp 97.3  F (36.3  C)   Resp 12   Ht 1.786 m (5' 10.3\")   Wt 96 kg (211 lb 9.6 oz)   SpO2 97%   BMI 30.10 kg/m   Estimated body mass index is 30.1 kg/m  as calculated from the following:    Height as of this encounter: 1.786 m (5' 10.3\").    Weight as of this encounter: 96 kg (211 lb 9.6 oz).  Physical Exam  GENERAL: healthy, alert and no distress  EYES: Eyes grossly normal to inspection, PERRL and conjunctivae and sclerae normal  HENT: ear canals and TM's normal, nose and mouth without ulcers or lesions  NECK: no adenopathy, no asymmetry, masses, or scars and thyroid normal to palpation  RESP: lungs clear to auscultation - no rales, rhonchi or wheezes  CV: regular rate and rhythm, normal S1 S2, no S3 or S4, no murmur, click or rub, no peripheral edema and peripheral pulses strong  ABDOMEN: soft, nontender, no hepatosplenomegaly, no masses and bowel sounds normal  MS: no gross musculoskeletal defects noted, no edema  SKIN: no suspicious lesions or rashes  NEURO: Normal strength and tone, mentation intact and speech normal        ASSESSMENT / PLAN:       ICD-10-CM    1. Encounter for general adult medical examination with abnormal findings  Z00.01 Lipid panel reflex to direct LDL Fasting     PSA, screen     CANCELED: CBC with platelets     CANCELED: Comprehensive metabolic panel (BMP + Alb, Alk Phos, ALT, AST, Total. Bili, TP)      2. Screening for prostate cancer  Z12.5 PSA, screen      3. Encounter for abdominal aortic aneurysm (AAA) screening  Z13.6 US Aorta Medicare AAA Screening      4. Essential hypertension  I10 lisinopril-hydrochlorothiazide (ZESTORETIC) 10-12.5 MG tablet      5. Hyperlipidemia LDL goal <130  E78.5 atorvastatin (LIPITOR) 20 MG tablet      6. " "Microcytic anemia  D50.9 Adult Oncology/Hematology  Referral          Patient has been advised of split billing requirements and indicates understanding: Yes      COUNSELING:  Reviewed preventive health counseling, as reflected in patient instructions      BMI:   Estimated body mass index is 30.1 kg/m  as calculated from the following:    Height as of this encounter: 1.786 m (5' 10.3\").    Weight as of this encounter: 96 kg (211 lb 9.6 oz).   Weight management plan: Discussed healthy diet and exercise guidelines      He reports that he quit smoking about 51 years ago. His smoking use included cigarettes. He started smoking about 55 years ago. He has a 1.00 pack-year smoking history. He has never used smokeless tobacco.      Appropriate preventive services were discussed with this patient, including applicable screening as appropriate for fall prevention, nutrition, physical activity, Tobacco-use cessation, weight loss and cognition.  Checklist reviewing preventive services available has been given to the patient.    Reviewed patients plan of care and provided an AVS. The Basic Care Plan (routine screening as documented in Health Maintenance) for Dominguez meets the Care Plan requirement. This Care Plan has been established and reviewed with the Patient.          Brent Dietz MD  [facility]    Identified Health Risks:  I have reviewed Opioid Use Disorder and Substance Use Disorder risk factors and made any needed referrals.   "

## 2023-10-17 ENCOUNTER — ANCILLARY PROCEDURE (OUTPATIENT)
Dept: ULTRASOUND IMAGING | Facility: CLINIC | Age: 73
End: 2023-10-17
Attending: FAMILY MEDICINE
Payer: COMMERCIAL

## 2023-10-17 DIAGNOSIS — Z13.6 ENCOUNTER FOR ABDOMINAL AORTIC ANEURYSM (AAA) SCREENING: ICD-10-CM

## 2023-10-17 DIAGNOSIS — G62.89 OTHER POLYNEUROPATHY: ICD-10-CM

## 2023-10-17 PROBLEM — Q25.40 ABNORMALITY OF ABDOMINAL AORTA: Status: ACTIVE | Noted: 2023-10-17

## 2023-10-17 PROCEDURE — 76706 US ABDL AORTA SCREEN AAA: CPT

## 2023-10-17 NOTE — TELEPHONE ENCOUNTER
Prescription approved per [facility] Refill Protocol.  Jayna Luo, RN  [facility] Triage Nurse

## 2023-11-16 NOTE — TELEPHONE ENCOUNTER
RECORDS STATUS - ALL OTHER DIAGNOSIS      RECORDS RECEIVED FROM: [facility] - Internal Records   DATE RECEIVED: 11/16

## 2023-12-01 ENCOUNTER — TELEPHONE (OUTPATIENT)
Dept: ONCOLOGY | Facility: CLINIC | Age: 73
End: 2023-12-01
Payer: COMMERCIAL

## 2023-12-01 NOTE — TELEPHONE ENCOUNTER
Left voicemail message for patient informing him of rescheduling upcoming appointment, moved from 12/15 to 1/1024.

## 2023-12-06 ENCOUNTER — VIRTUAL VISIT (OUTPATIENT)
Dept: ONCOLOGY | Facility: CLINIC | Age: 73
End: 2023-12-06
Payer: COMMERCIAL

## 2023-12-06 VITALS — WEIGHT: 208 LBS | HEIGHT: 71 IN | BODY MASS INDEX: 29.12 KG/M2

## 2023-12-06 DIAGNOSIS — R63.39 OTHER FEEDING DIFFICULTIES: ICD-10-CM

## 2023-12-06 DIAGNOSIS — Z71.41 ALCOHOL CESSATION COUNSELING: ICD-10-CM

## 2023-12-06 DIAGNOSIS — U07.1 INFECTION DUE TO 2019 NOVEL CORONAVIRUS: ICD-10-CM

## 2023-12-06 DIAGNOSIS — D64.9 NORMOCYTIC ANEMIA: Primary | ICD-10-CM

## 2023-12-06 PROCEDURE — 99204 OFFICE O/P NEW MOD 45 MIN: CPT | Mod: 95 | Performed by: INTERNAL MEDICINE

## 2023-12-06 ASSESSMENT — PAIN SCALES - GENERAL: PAINLEVEL: NO PAIN (0)

## 2023-12-06 NOTE — NURSING NOTE
No other vitals to report today      Is the patient currently in the state of [place]? YES    Visit mode:VIDEO    If the visit is dropped, the patient can be reconnected by: VIDEO VISIT: Send to e-mail at: [email or web address]    Will anyone else be joining the visit? NO  (If patient encounters technical issues they should call [phone]    How would you like to obtain your AVS? MyChart    Are changes needed to the allergy or medication list?  Pt states not taking Ophthalmic solutions    Reason for visit: Hematology (Microcytic anemia- new pt (records in epic)), Video Visit, and RECHECK    Corbin Robledo MA VVF

## 2023-12-06 NOTE — LETTER
12/6/2023         RE: Dominguez Pittman  [address]  [place] [address]        Dear Colleague,    Thank you for referring your patient, Dominguez Pittman, to the [facility]. Please see a copy of my visit note below.    Video-Visit Details     Video Start Time: 10:53AM     Type of service:  Video Visit     Video End Time: 11:33AM    Originating Location (pt. Location): Home     Distant Location (provider location):  [facility] off site     Platform used for Video Visit: [email or web address]     [facility]    Hematology/Oncology New Patient Note      Today's Date: December 6, 2023     Referring provider:Brent Dietz MD   Reason for Consultation: chronic anemia     HISTORY OF PRESENT ILLNESS: Dominguez Pittman is a 73 year old male who was referred to the Hematology/Oncology Clinic for chronic anemia     Patient has medical history including hypertension, hyperlipidemia, varicose veins, arthritis, inguinal hernia, abdominal aortic aneurysm, sigmoid colon diverticulosis, ED, frequent alcohol use, history of tobacco use (5248-4395, half pack per day)    Regarding anemia:  Pt if interested in blood and platelet donation and has been unable to do so 2/2 anemia. He reports he has rare platelets and wants to donate because of this. Due to this, pt has been taking oral ferrous 325mg PO x1 year with food and [place] vitamins with iron. He is a retired  and was unable to donate at that time and is now retired.     - pt reports he was told his hgb was low even in 1980  - 2008 hemoglobin 9.8-12.8, 4/2011 hemoglobin 13.1, 10/2019-9/2023 hemoglobin 11-12, 9/20/2023 hemoglobin 12.0, hematocrit 36.9, MCV 92  - 9/23 WBC and platelet normal, Cr 0.89  - 8/2020 ferritin 35, iron saturation index 20, TIBC 398, iron 79        -8/2017 colonoscopy: Diverticulosis in sigmoid colon, follow-up 10 years    Pt denies mucosal bleeding, melena, hematochezia,  hematuria, hematemesis, purpura, ecchymosis.   Pt denies frequent/excess use of NSAIDs, blood thinners. He takes aspirin 81mg PO daily  He tested positive for COVID 12/3/23 and has been taking advil 600mg daily since that time     Pt reports he is following GoLo diet w/release tablet that contains plant extracts. Is following balanced diet including animal products including red meat infrequently.     Pt reports alcohol use 4-5 days per week, with 2-3 beers or glasses of wine per drinking day' sometimes on the weekend 5-6 drinks per day.        REVIEW OF SYSTEMS:   A 14 point ROS was reviewed with pertinent positives and negatives in the HPI.        HOME MEDICATIONS:  Current Outpatient Medications   Medication Sig Dispense Refill     amitriptyline (ELAVIL) 25 MG tablet TAKE 1 TO 2 TABLETS BY MOUTH AT BEDTIME 180 tablet 2     ASPIRIN PO Take 81 mg by mouth daily       atorvastatin (LIPITOR) 20 MG tablet Take 1 tablet (20 mg) by mouth daily 90 tablet 3     brimonidine (ALPHAGAN) 0.2 % ophthalmic solution PLACE 1 DROP INTO INTO LEFT EYE TWICE A DAY       fish oil-omega-3 fatty acids 1000 MG capsule Take 2 capsules by mouth daily. 180 capsule 12     GLUCOSAMINE SULFATE PO Take 1,500 mg by mouth daily.         lisinopril-hydrochlorothiazide (ZESTORETIC) 10-12.5 MG tablet Take 1 tablet by mouth daily 90 tablet 3     MULTI-VITAMIN OR TABS 1 q day   0     prednisoLONE acetate (PRED FORTE) 1 % ophthalmic suspension INSTILL 1 (ONE) DROP INTO LEFT EYE 4 TIMES DAILY       trimethoprim-polymyxin b (POLYTRIM) 76018-5.1 UNIT/ML-% ophthalmic solution INSTILL 1 (ONE) DROP INTO LEFT EYE 4 TIMES DAILY       VITAMIN D, CHOLECALCIFEROL, PO Take by mouth daily           ALLERGIES:  Allergies   Allergen Reactions     Penicillins Hives         PAST MEDICAL HISTORY:  Past Medical History:   Diagnosis Date     Arthritis      High cholesterol      Hx musculoskletl dis NEC      Hypertension      Other nonspecific findings on examination of  blood(790.99)      PAC (premature atrial contraction)      Personal history of other disorders of nervous system and sense organs      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          PAST SURGICAL HISTORY:  Past Surgical History:   Procedure Laterality Date     ARTHRODESIS WRIST Left 2016    Procedure: ARTHRODESIS WRIST;  Surgeon: Mayda Teresa MD;  Location: [facility]     ARTHRODESIS WRIST Left 2016    Procedure: ARTHRODESIS WRIST;  Surgeon: Mayda Teresa MD;  Location: [facility]     COLONOSCOPY  2012    Procedure: COLONOSCOPY;  COLONOSCOPY;  Surgeon: Kris Garcia MD;  Location:  GI     COLONOSCOPY N/A 2017    Procedure: COLONOSCOPY;  COLONOSCOPY;  Surgeon: Emil Plaza MD;  Location:  GI     ENDOSCOPIC RELEASE CARPAL TUNNEL Left 2016    Procedure: ENDOSCOPIC RELEASE CARPAL TUNNEL;  Surgeon: Mayda Teresa MD;  Location: [facility]     EYE SURGERY  2016     JOINT REPLACEMTN, KNEE RT/LT  2007    Joint Replacement knee LT, right hip replacment      REMOVE HARDWARE WRIST Left 2016    Procedure: REMOVE HARDWARE WRIST;  Surgeon: Mayda Teresa MD;  Location: [facility]     SOFT TISSUE SURGERY  2019     SURGICAL HISTORY OF -       arthroscopyx 3     SURGICAL HISTORY OF -       lt cholesteotoma     ZZC NONSPECIFIC PROCEDURE      diskectomy L5s         SOCIAL HISTORY:  Social History     Socioeconomic History     Marital status:      Spouse name: julio     Number of children: 2     Years of education: 16     Highest education level: Not on file   Occupational History     Occupation:      Employer: [employer]   Tobacco Use     Smoking status: Former     Packs/day: 0.50     Years: 2.00     Additional pack years: 0.00     Total pack years: 1.00     Types: Cigarettes     Start date: 1968     Quit date: 1972     Years since quittin.8     Smokeless tobacco: Never    Substance and Sexual Activity     Alcohol use: Yes     Alcohol/week: 10.0 standard drinks of alcohol     Comment: 3times/week     Drug use: No     Sexual activity: Yes     Partners: Female   Other Topics Concern      Service No     Blood Transfusions No     Caffeine Concern No     Occupational Exposure No     Hobby Hazards No     Sleep Concern Yes     Stress Concern No     Weight Concern No     Special Diet No     Back Care Yes     Comment: surgery 2000   L5  S2     Exercise Yes     Bike Helmet Yes     Seat Belt Yes     Self-Exams Yes     Parent/sibling w/ CABG, MI or angioplasty before 65F 55M? No   Social History Narrative     Not on file     Social Determinants of Health     Financial Resource Strain: Low Risk  (9/20/2023)    Financial Resource Strain      Within the past 12 months, have you or your family members you live with been unable to get utilities (heat, electricity) when it was really needed?: No   Food Insecurity: Low Risk  (9/20/2023)    Food Insecurity      Within the past 12 months, did you worry that your food would run out before you got money to buy more?: No      Within the past 12 months, did the food you bought just not last and you didn t have money to get more?: No   Transportation Needs: Low Risk  (9/20/2023)    Transportation Needs      Within the past 12 months, has lack of transportation kept you from medical appointments, getting your medicines, non-medical meetings or appointments, work, or from getting things that you need?: No   Physical Activity: Not on file   Stress: Not on file   Social Connections: Not on file   Interpersonal Safety: Low Risk  (10/6/2023)    Interpersonal Safety      Do you feel physically and emotionally safe where you currently live?: Yes      Within the past 12 months, have you been hit, slapped, kicked or otherwise physically hurt by someone?: No      Within the past 12 months, have you been humiliated or emotionally abused in other ways by your  partner or ex-partner?: No   Housing Stability: Low Risk  (9/20/2023)    Housing Stability      Do you have housing? : Yes      Are you worried about losing your housing?: No         FAMILY HISTORY:  Family History   Problem Relation Age of Onset     Hyperlipidemia Father      C.A.D. Paternal Grandfather         MI         PHYSICAL EXAM:  Vital signs:  There were no vitals taken for this visit.         LABS:  CBC RESULTS:   Recent Labs   Lab Test 09/20/23  1613   WBC 6.0   RBC 4.02*   HGB 12.0*   HCT 36.9*   MCV 92   MCH 29.9   MCHC 32.5   RDW 13.9          Recent Labs   Lab Test 09/20/23  1613 10/04/22  0933    133   POTASSIUM 4.8 5.1   CHLORIDE 104 102   CO2 23 25   ANIONGAP 11 6   GLC 91 107*   BUN 20.1 13   CR 0.89 0.75   SHANNON 9.5 9.3         PATHOLOGY:      IMAGING:      ASSESSMENT/PLAN:  Dominguez Pittman is a 73 year old male with:    # Chronic normocytic anemia  - 2008 hemoglobin 9.8-12.8, 4/2011 hemoglobin 13.1, 10/2019-9/2023 hemoglobin 11-12, 9/20/2023 hemoglobin 12.0, hematocrit 36.9, MCV 92  - 8/2017 colonoscopy: Diverticulosis in sigmoid colon, follow-up 10 years    PLAN:  - check the following labs:  CBC  Ferritin, iron studies  B12, RBC folate, copper, zinc  TSH  CMP, retic, LDH, haptoglobin  Peripheral smear  UA blood and RBC  celiac screen with tissue transglutaminase IgG and IgA  Hgb electrophoresis   - recommend alcohol cessation    #COVID infection  - pt sx since 11/30/23, tested positive 12/3/23  - pt overall asx    #frequent alcohol use  - discussed alcohol cessation, pt agreeable    RTC 1/3/24 for follow up with me  Pt leaving town to go to [place] 1/9/24-3/5/24    Jose Syed DO  Hematology/Oncology  [facility]    Future Appointments   Date Time Provider Department Center   12/6/2023 10:45 AM Jose Syed DO [facility]          Again, thank you for allowing me to participate in the care of your patient.        Sincerely,        JOSE SYED  DO

## 2023-12-06 NOTE — LETTER
12/6/2023         RE: Dominguez Pittman  [address]  [place] [address]        Dear Colleague,    Thank you for referring your patient, Dominguez Pittman, to the [facility]. Please see a copy of my visit note below.    Video-Visit Details     Video Start Time: 10:53AM     Type of service:  Video Visit     Video End Time: 11:33AM    Originating Location (pt. Location): Home     Distant Location (provider location):  [facility] off site     Platform used for Video Visit: [employer]     [facility]    Hematology/Oncology New Patient Note      Today's Date: December 6, 2023     Referring provider:Brent Dietz MD   Reason for Consultation: chronic anemia     HISTORY OF PRESENT ILLNESS: Dominguez Pittman is a 73 year old male who was referred to the Hematology/Oncology Clinic for chronic anemia     Patient has medical history including hypertension, hyperlipidemia, varicose veins, arthritis, inguinal hernia, abdominal aortic aneurysm, sigmoid colon diverticulosis, ED, frequent alcohol use, history of tobacco use (6843-3457, half pack per day)    Regarding anemia:  Pt if interested in blood and platelet donation and has been unable to do so 2/2 anemia. He reports he has rare platelets and wants to donate because of this. Due to this, pt has been taking oral ferrous 325mg PO x1 year with food and [place] vitamins with iron. He is a retired  and was unable to donate at that time and is now retired.     - pt reports he was told his hgb was low even in 1980  - 2008 hemoglobin 9.8-12.8, 4/2011 hemoglobin 13.1, 10/2019-9/2023 hemoglobin 11-12, 9/20/2023 hemoglobin 12.0, hematocrit 36.9, MCV 92  - 9/23 WBC and platelet normal, Cr 0.89  - 8/2020 ferritin 35, iron saturation index 20, TIBC 398, iron 79        -8/2017 colonoscopy: Diverticulosis in sigmoid colon, follow-up 10 years    Pt denies mucosal bleeding, melena, hematochezia,  hematuria, hematemesis, purpura, ecchymosis.   Pt denies frequent/excess use of NSAIDs, blood thinners. He takes aspirin 81mg PO daily  He tested positive for COVID 12/3/23 and has been taking advil 600mg daily since that time     Pt reports he is following GoLo diet w/release tablet that contains plant extracts. Is following balanced diet including animal products including red meat infrequently.     Pt reports alcohol use 4-5 days per week, with 2-3 beers or glasses of wine per drinking day' sometimes on the weekend 5-6 drinks per day.        REVIEW OF SYSTEMS:   A 14 point ROS was reviewed with pertinent positives and negatives in the HPI.        HOME MEDICATIONS:  Current Outpatient Medications   Medication Sig Dispense Refill     amitriptyline (ELAVIL) 25 MG tablet TAKE 1 TO 2 TABLETS BY MOUTH AT BEDTIME 180 tablet 2     ASPIRIN PO Take 81 mg by mouth daily       atorvastatin (LIPITOR) 20 MG tablet Take 1 tablet (20 mg) by mouth daily 90 tablet 3     brimonidine (ALPHAGAN) 0.2 % ophthalmic solution PLACE 1 DROP INTO INTO LEFT EYE TWICE A DAY       fish oil-omega-3 fatty acids 1000 MG capsule Take 2 capsules by mouth daily. 180 capsule 12     GLUCOSAMINE SULFATE PO Take 1,500 mg by mouth daily.         lisinopril-hydrochlorothiazide (ZESTORETIC) 10-12.5 MG tablet Take 1 tablet by mouth daily 90 tablet 3     MULTI-VITAMIN OR TABS 1 q day   0     prednisoLONE acetate (PRED FORTE) 1 % ophthalmic suspension INSTILL 1 (ONE) DROP INTO LEFT EYE 4 TIMES DAILY       trimethoprim-polymyxin b (POLYTRIM) 78746-6.1 UNIT/ML-% ophthalmic solution INSTILL 1 (ONE) DROP INTO LEFT EYE 4 TIMES DAILY       VITAMIN D, CHOLECALCIFEROL, PO Take by mouth daily           ALLERGIES:  Allergies   Allergen Reactions     Penicillins Hives         PAST MEDICAL HISTORY:  Past Medical History:   Diagnosis Date     Arthritis      High cholesterol      Hx musculoskletl dis NEC      Hypertension      Other nonspecific findings on examination of  blood(790.99)      PAC (premature atrial contraction)      Personal history of other disorders of nervous system and sense organs      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          PAST SURGICAL HISTORY:  Past Surgical History:   Procedure Laterality Date     ARTHRODESIS WRIST Left 2016    Procedure: ARTHRODESIS WRIST;  Surgeon: Mayda Teresa MD;  Location: [facility]     ARTHRODESIS WRIST Left 2016    Procedure: ARTHRODESIS WRIST;  Surgeon: Mayda Teresa MD;  Location: [facility]     COLONOSCOPY  2012    Procedure: COLONOSCOPY;  COLONOSCOPY;  Surgeon: Kris Garcia MD;  Location:  GI     COLONOSCOPY N/A 2017    Procedure: COLONOSCOPY;  COLONOSCOPY;  Surgeon: Emil Plaza MD;  Location:  GI     ENDOSCOPIC RELEASE CARPAL TUNNEL Left 2016    Procedure: ENDOSCOPIC RELEASE CARPAL TUNNEL;  Surgeon: Mayda Teresa MD;  Location: [facility]     EYE SURGERY  2016     JOINT REPLACEMTN, KNEE RT/LT  2007    Joint Replacement knee LT, right hip replacment      REMOVE HARDWARE WRIST Left 2016    Procedure: REMOVE HARDWARE WRIST;  Surgeon: Mayda Teresa MD;  Location: [facility]     SOFT TISSUE SURGERY  2019     SURGICAL HISTORY OF -       arthroscopyx 3     SURGICAL HISTORY OF -       lt cholesteotoma     ZZC NONSPECIFIC PROCEDURE      diskectomy L5s         SOCIAL HISTORY:  Social History     Socioeconomic History     Marital status:      Spouse name: julio     Number of children: 2     Years of education: 16     Highest education level: Not on file   Occupational History     Occupation:      Employer: [employer]   Tobacco Use     Smoking status: Former     Packs/day: 0.50     Years: 2.00     Additional pack years: 0.00     Total pack years: 1.00     Types: Cigarettes     Start date: 1968     Quit date: 1972     Years since quittin.8     Smokeless tobacco: Never    Substance and Sexual Activity     Alcohol use: Yes     Alcohol/week: 10.0 standard drinks of alcohol     Comment: 3times/week     Drug use: No     Sexual activity: Yes     Partners: Female   Other Topics Concern      Service No     Blood Transfusions No     Caffeine Concern No     Occupational Exposure No     Hobby Hazards No     Sleep Concern Yes     Stress Concern No     Weight Concern No     Special Diet No     Back Care Yes     Comment: surgery 2000   L5  S2     Exercise Yes     Bike Helmet Yes     Seat Belt Yes     Self-Exams Yes     Parent/sibling w/ CABG, MI or angioplasty before 65F 55M? No   Social History Narrative     Not on file     Social Determinants of Health     Financial Resource Strain: Low Risk  (9/20/2023)    Financial Resource Strain      Within the past 12 months, have you or your family members you live with been unable to get utilities (heat, electricity) when it was really needed?: No   Food Insecurity: Low Risk  (9/20/2023)    Food Insecurity      Within the past 12 months, did you worry that your food would run out before you got money to buy more?: No      Within the past 12 months, did the food you bought just not last and you didn t have money to get more?: No   Transportation Needs: Low Risk  (9/20/2023)    Transportation Needs      Within the past 12 months, has lack of transportation kept you from medical appointments, getting your medicines, non-medical meetings or appointments, work, or from getting things that you need?: No   Physical Activity: Not on file   Stress: Not on file   Social Connections: Not on file   Interpersonal Safety: Low Risk  (10/6/2023)    Interpersonal Safety      Do you feel physically and emotionally safe where you currently live?: Yes      Within the past 12 months, have you been hit, slapped, kicked or otherwise physically hurt by someone?: No      Within the past 12 months, have you been humiliated or emotionally abused in other ways by your  partner or ex-partner?: No   Housing Stability: Low Risk  (9/20/2023)    Housing Stability      Do you have housing? : Yes      Are you worried about losing your housing?: No         FAMILY HISTORY:  Family History   Problem Relation Age of Onset     Hyperlipidemia Father      C.A.D. Paternal Grandfather         MI         PHYSICAL EXAM:  Vital signs:  There were no vitals taken for this visit.         LABS:  CBC RESULTS:   Recent Labs   Lab Test 09/20/23  1613   WBC 6.0   RBC 4.02*   HGB 12.0*   HCT 36.9*   MCV 92   MCH 29.9   MCHC 32.5   RDW 13.9          Recent Labs   Lab Test 09/20/23  1613 10/04/22  0933    133   POTASSIUM 4.8 5.1   CHLORIDE 104 102   CO2 23 25   ANIONGAP 11 6   GLC 91 107*   BUN 20.1 13   CR 0.89 0.75   SHANNON 9.5 9.3         PATHOLOGY:      IMAGING:      ASSESSMENT/PLAN:  Dominguez Pittman is a 73 year old male with:    # Chronic normocytic anemia  - 2008 hemoglobin 9.8-12.8, 4/2011 hemoglobin 13.1, 10/2019-9/2023 hemoglobin 11-12, 9/20/2023 hemoglobin 12.0, hematocrit 36.9, MCV 92  - 8/2017 colonoscopy: Diverticulosis in sigmoid colon, follow-up 10 years    PLAN:  - check the following labs:  CBC  Ferritin, iron studies  B12, RBC folate, copper, zinc  TSH  CMP, retic, LDH, haptoglobin  Peripheral smear  UA blood and RBC  celiac screen with tissue transglutaminase IgG and IgA  Hgb electrophoresis   - recommend alcohol cessation    #COVID infection  - pt sx since 11/30/23, tested positive 12/3/23  - pt overall asx    #frequent alcohol use  - discussed alcohol cessation, pt agreeable    RTC 1/3/24 for follow up with me  Pt leaving town to go to [place] 1/9/24-3/5/24    Jose Syed DO  Hematology/Oncology  [facility]    Future Appointments   Date Time Provider Department Center   12/6/2023 10:45 AM Jose Syed DO [facility]          Again, thank you for allowing me to participate in the care of your patient.        Sincerely,        JOSE SYED  DO

## 2023-12-06 NOTE — PROGRESS NOTES
Video-Visit Details     Video Start Time: 10:53AM     Type of service:  Video Visit     Video End Time: 11:33AM    Originating Location (pt. Location): Home     Distant Location (provider location):  [facility] off site     Platform used for Video Visit: [facility]    Hematology/Oncology New Patient Note      Today's Date: December 6, 2023     Referring provider:Brent Dietz MD   Reason for Consultation: chronic anemia     HISTORY OF PRESENT ILLNESS: Dominguez Pittman is a 73 year old male who was referred to the Hematology/Oncology Clinic for chronic anemia     Patient has medical history including hypertension, hyperlipidemia, varicose veins, arthritis, inguinal hernia, abdominal aortic aneurysm, sigmoid colon diverticulosis, ED, frequent alcohol use, history of tobacco use (0050-9322, half pack per day)    Regarding anemia:  Pt if interested in blood and platelet donation and has been unable to do so 2/2 anemia. He reports he has rare platelets and wants to donate because of this. Due to this, pt has been taking oral ferrous 325mg PO x1 year with food and [place] vitamins with iron. He is a retired  and was unable to donate at that time and is now retired.     - pt reports he was told his hgb was low even in 1980  - 2008 hemoglobin 9.8-12.8, 4/2011 hemoglobin 13.1, 10/2019-9/2023 hemoglobin 11-12, 9/20/2023 hemoglobin 12.0, hematocrit 36.9, MCV 92  - 9/23 WBC and platelet normal, Cr 0.89  - 8/2020 ferritin 35, iron saturation index 20, TIBC 398, iron 79        -8/2017 colonoscopy: Diverticulosis in sigmoid colon, follow-up 10 years    Pt denies mucosal bleeding, melena, hematochezia, hematuria, hematemesis, purpura, ecchymosis.   Pt denies frequent/excess use of NSAIDs, blood thinners. He takes aspirin 81mg PO daily  He tested positive for COVID 12/3/23 and has been taking advil 600mg daily since that time     Pt reports he is following GoLo diet w/release  tablet that contains plant extracts. Is following balanced diet including animal products including red meat infrequently.     Pt reports alcohol use 4-5 days per week, with 2-3 beers or glasses of wine per drinking day' sometimes on the weekend 5-6 drinks per day.        REVIEW OF SYSTEMS:   A 14 point ROS was reviewed with pertinent positives and negatives in the HPI.        HOME MEDICATIONS:  Current Outpatient Medications   Medication Sig Dispense Refill    amitriptyline (ELAVIL) 25 MG tablet TAKE 1 TO 2 TABLETS BY MOUTH AT BEDTIME 180 tablet 2    ASPIRIN PO Take 81 mg by mouth daily      atorvastatin (LIPITOR) 20 MG tablet Take 1 tablet (20 mg) by mouth daily 90 tablet 3    brimonidine (ALPHAGAN) 0.2 % ophthalmic solution PLACE 1 DROP INTO INTO LEFT EYE TWICE A DAY      fish oil-omega-3 fatty acids 1000 MG capsule Take 2 capsules by mouth daily. 180 capsule 12    GLUCOSAMINE SULFATE PO Take 1,500 mg by mouth daily.        lisinopril-hydrochlorothiazide (ZESTORETIC) 10-12.5 MG tablet Take 1 tablet by mouth daily 90 tablet 3    MULTI-VITAMIN OR TABS 1 q day   0    prednisoLONE acetate (PRED FORTE) 1 % ophthalmic suspension INSTILL 1 (ONE) DROP INTO LEFT EYE 4 TIMES DAILY      trimethoprim-polymyxin b (POLYTRIM) 46597-2.1 UNIT/ML-% ophthalmic solution INSTILL 1 (ONE) DROP INTO LEFT EYE 4 TIMES DAILY      VITAMIN D, CHOLECALCIFEROL, PO Take by mouth daily           ALLERGIES:  Allergies   Allergen Reactions    Penicillins Hives         PAST MEDICAL HISTORY:  Past Medical History:   Diagnosis Date    Arthritis     High cholesterol     Hx musculoskletl dis NEC     Hypertension     Other nonspecific findings on examination of blood(790.99)     PAC (premature atrial contraction)     Personal history of other disorders of nervous system and sense organs     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          PAST SURGICAL HISTORY:  Past Surgical History:   Procedure Laterality Date     ARTHRODESIS WRIST Left 2016    Procedure: ARTHRODESIS WRIST;  Surgeon: Mayda Teresa MD;  Location: [facility]    ARTHRODESIS WRIST Left 2016    Procedure: ARTHRODESIS WRIST;  Surgeon: Mayda Teresa MD;  Location: [facility]    COLONOSCOPY  2012    Procedure: COLONOSCOPY;  COLONOSCOPY;  Surgeon: Kris Garcia MD;  [facility]    COLONOSCOPY N/A 2017    Procedure: COLONOSCOPY;  COLONOSCOPY;  Surgeon: Emil Plaza MD;  Location:  [facility]    ENDOSCOPIC RELEASE CARPAL TUNNEL Left 2016    Procedure: ENDOSCOPIC RELEASE CARPAL TUNNEL;  Surgeon: Mayda Teresa MD;  Location: [facility]    EYE SURGERY  2016    JOINT REPLACEMTN, KNEE RT/LT  2007    Joint Replacement knee LT, right hip replacment     REMOVE HARDWARE WRIST Left 2016    Procedure: REMOVE HARDWARE WRIST;  Surgeon: Mayda Teresa MD;  Location: [facility]    SOFT TISSUE SURGERY  2019    SURGICAL HISTORY OF -       arthroscopyx 3    SURGICAL HISTORY OF -       lt cholesteotoma    ZZC NONSPECIFIC PROCEDURE      diskectomy L5s         SOCIAL HISTORY:  Social History     Socioeconomic History    Marital status:      Spouse name: julio    Number of children: 2    Years of education: 16    Highest education level: Not on file   Occupational History    Occupation: [employer]     Employer: [employer]   Tobacco Use    Smoking status: Former     Packs/day: 0.50     Years: 2.00     Additional pack years: 0.00     Total pack years: 1.00     Types: Cigarettes     Start date: 1968     Quit date: 1972     Years since quittin.8    Smokeless tobacco: Never   Substance and Sexual Activity    Alcohol use: Yes     Alcohol/week: 10.0 standard drinks of alcohol     Comment: 3times/week    Drug use: No    Sexual activity: Yes     Partners: Female   Other Topics Concern     Service No    Blood Transfusions No    Caffeine Concern No    Occupational Exposure No     Hobby Hazards No    Sleep Concern Yes    Stress Concern No    Weight Concern No    Special Diet No    Back Care Yes     Comment: surgery 2000   L5  S2    Exercise Yes    Bike Helmet Yes    Seat Belt Yes    Self-Exams Yes    Parent/sibling w/ CABG, MI or angioplasty before 65F 55M? No   Social History Narrative    Not on file     Social Determinants of Health     Financial Resource Strain: Low Risk  (9/20/2023)    Financial Resource Strain     Within the past 12 months, have you or your family members you live with been unable to get utilities (heat, electricity) when it was really needed?: No   Food Insecurity: Low Risk  (9/20/2023)    Food Insecurity     Within the past 12 months, did you worry that your food would run out before you got money to buy more?: No     Within the past 12 months, did the food you bought just not last and you didn t have money to get more?: No   Transportation Needs: Low Risk  (9/20/2023)    Transportation Needs     Within the past 12 months, has lack of transportation kept you from medical appointments, getting your medicines, non-medical meetings or appointments, work, or from getting things that you need?: No   Physical Activity: Not on file   Stress: Not on file   Social Connections: Not on file   Interpersonal Safety: Low Risk  (10/6/2023)    Interpersonal Safety     Do you feel physically and emotionally safe where you currently live?: Yes     Within the past 12 months, have you been hit, slapped, kicked or otherwise physically hurt by someone?: No     Within the past 12 months, have you been humiliated or emotionally abused in other ways by your partner or ex-partner?: No   Housing Stability: Low Risk  (9/20/2023)    Housing Stability     Do you have housing? : Yes     Are you worried about losing your housing?: No         FAMILY HISTORY:  Family History   Problem Relation Age of Onset    Hyperlipidemia Father     C.A.D. Paternal Grandfather         MI         PHYSICAL EXAM:  Vital  signs:  There were no vitals taken for this visit.         LABS:  CBC RESULTS:   Recent Labs   Lab Test 09/20/23  1613   WBC 6.0   RBC 4.02*   HGB 12.0*   HCT 36.9*   MCV 92   MCH 29.9   MCHC 32.5   RDW 13.9          Recent Labs   Lab Test 09/20/23  1613 10/04/22  0933    133   POTASSIUM 4.8 5.1   CHLORIDE 104 102   CO2 23 25   ANIONGAP 11 6   GLC 91 107*   BUN 20.1 13   CR 0.89 0.75   SHANNON 9.5 9.3         PATHOLOGY:      IMAGING:      ASSESSMENT/PLAN:  Dominguez Pittman is a 73 year old male with:    # Chronic normocytic anemia  - 2008 hemoglobin 9.8-12.8, 4/2011 hemoglobin 13.1, 10/2019-9/2023 hemoglobin 11-12, 9/20/2023 hemoglobin 12.0, hematocrit 36.9, MCV 92  - 8/2017 colonoscopy: Diverticulosis in sigmoid colon, follow-up 10 years    PLAN:  - check the following labs:  CBC  Ferritin, iron studies  B12, RBC folate, copper, zinc  TSH  CMP, retic, LDH, haptoglobin  Peripheral smear  UA blood and RBC  celiac screen with tissue transglutaminase IgG and IgA  Hgb electrophoresis   - recommend alcohol cessation    #COVID infection  - pt sx since 11/30/23, tested positive 12/3/23  - pt overall asx    #frequent alcohol use  - discussed alcohol cessation, pt agreeable    RTC 1/3/24 for follow up with me  Pt leaving town to go to [place] 1/9/24-3/5/24    Jackelyn Hernandez DO  Hematology/Oncology  [facility]    Future Appointments   Date Time Provider Department Center   12/6/2023 10:45 AM Jackelyn Hernandez DO [facility]

## 2023-12-07 NOTE — PATIENT INSTRUCTIONS
Today:  Please follow up as scheduled below:    Please follow up with Dr. Hernandez.  Please complete labs prior to follow up appointment.    Lab Date/Time: 12/11/23 @ 12:40 at [facility]    Video visit follow up with Dr. Hernandez on Date/Time: 01/03.24 @ 9:45     If you have any questions or concerns please feel free to call.    If you need to reschedule please call:  Clinic or Lab Appointment - [phone]  Infusion - [phone]  Imaging - [phone]    Hoa Kim CMA  [facility]   Oncology/Hematology  [facility]  [phone]    After Hours Oncology Nurse Line - [phone]

## 2023-12-07 NOTE — NURSING NOTE
DISCHARGE PLAN:  Next appointments: See patient instruction section  Departure Mode: video  Accompanied by:    minutes for medical assistant discharge (face to face time)     Dominguez Pittman is here today for onc video visit.  Patient was not seen by medical assistant at time of the appointment.   Appointments scheduled for labs and follow up. I called pt and coordinated appts with him. I also mailed him AVS and calendar per his request. See patient instructions and Oncologist's Progress note for further details. Questions and concerns addressed to patient's satisfaction. Patient verbalized and demonstrated understanding of plan.  Contact information provided and patient is encouraged to call with any that arise in the interim of care.    Hoa HOPKINS [facility]  [phone]  12/7/2023, 10:57 AM

## 2023-12-12 ENCOUNTER — LAB (OUTPATIENT)
Dept: LAB | Facility: CLINIC | Age: 73
End: 2023-12-12
Payer: COMMERCIAL

## 2023-12-12 DIAGNOSIS — R63.39 OTHER FEEDING DIFFICULTIES: ICD-10-CM

## 2023-12-12 DIAGNOSIS — D64.9 NORMOCYTIC ANEMIA: ICD-10-CM

## 2023-12-12 LAB
ALBUMIN UR-MCNC: NEGATIVE MG/DL
APPEARANCE UR: CLEAR
BASOPHILS # BLD AUTO: 0.1 10E3/UL (ref 0–0.2)
BASOPHILS NFR BLD AUTO: 1 %
BILIRUB UR QL STRIP: NEGATIVE
COLOR UR AUTO: YELLOW
EOSINOPHIL # BLD AUTO: 0.1 10E3/UL (ref 0–0.7)
EOSINOPHIL NFR BLD AUTO: 1 %
ERYTHROCYTE [DISTWIDTH] IN BLOOD BY AUTOMATED COUNT: 13 % (ref 10–15)
GLUCOSE UR STRIP-MCNC: NEGATIVE MG/DL
HCT VFR BLD AUTO: 35.1 % (ref 40–53)
HCT VFR BLD AUTO: 35.1 % (ref 40–53)
HGB BLD-MCNC: 11.6 G/DL (ref 13.3–17.7)
HGB UR QL STRIP: NEGATIVE
IMM GRANULOCYTES # BLD: 0 10E3/UL
IMM GRANULOCYTES NFR BLD: 0 %
KETONES UR STRIP-MCNC: NEGATIVE MG/DL
LEUKOCYTE ESTERASE UR QL STRIP: NEGATIVE
LYMPHOCYTES # BLD AUTO: 1.8 10E3/UL (ref 0.8–5.3)
LYMPHOCYTES NFR BLD AUTO: 22 %
MCH RBC QN AUTO: 30.7 PG (ref 26.5–33)
MCHC RBC AUTO-ENTMCNC: 33 G/DL (ref 31.5–36.5)
MCV RBC AUTO: 93 FL (ref 78–100)
MONOCYTES # BLD AUTO: 0.5 10E3/UL (ref 0–1.3)
MONOCYTES NFR BLD AUTO: 7 %
NEUTROPHILS # BLD AUTO: 5.6 10E3/UL (ref 1.6–8.3)
NEUTROPHILS NFR BLD AUTO: 70 %
NITRATE UR QL: NEGATIVE
PH UR STRIP: 5.5 [PH] (ref 5–7)
PLATELET # BLD AUTO: 327 10E3/UL (ref 150–450)
RBC # BLD AUTO: 3.78 10E6/UL (ref 4.4–5.9)
RBC #/AREA URNS AUTO: NORMAL /HPF
RETICS # AUTO: 0.04 10E6/UL (ref 0.03–0.1)
RETICS/RBC NFR AUTO: 1.2 % (ref 0.5–2)
SP GR UR STRIP: 1.02 (ref 1–1.03)
UROBILINOGEN UR STRIP-ACNC: 0.2 E.U./DL
WBC # BLD AUTO: 8 10E3/UL (ref 4–11)
WBC #/AREA URNS AUTO: NORMAL /HPF

## 2023-12-12 PROCEDURE — 85045 AUTOMATED RETICULOCYTE COUNT: CPT

## 2023-12-12 PROCEDURE — 82525 ASSAY OF COPPER: CPT | Mod: 90

## 2023-12-12 PROCEDURE — 86364 TISS TRNSGLTMNASE EA IG CLAS: CPT

## 2023-12-12 PROCEDURE — 99207 BLOOD MORPHOLOGY PATHOLOGIST REVIEW: CPT | Performed by: PATHOLOGY

## 2023-12-12 PROCEDURE — 83550 IRON BINDING TEST: CPT

## 2023-12-12 PROCEDURE — 36415 COLL VENOUS BLD VENIPUNCTURE: CPT

## 2023-12-12 PROCEDURE — 82747 ASSAY OF FOLIC ACID RBC: CPT | Mod: 90

## 2023-12-12 PROCEDURE — 85025 COMPLETE CBC W/AUTO DIFF WBC: CPT

## 2023-12-12 PROCEDURE — 80053 COMPREHEN METABOLIC PANEL: CPT

## 2023-12-12 PROCEDURE — 82607 VITAMIN B-12: CPT

## 2023-12-12 PROCEDURE — 81001 URINALYSIS AUTO W/SCOPE: CPT

## 2023-12-12 PROCEDURE — 83010 ASSAY OF HAPTOGLOBIN QUANT: CPT | Performed by: INTERNAL MEDICINE

## 2023-12-12 PROCEDURE — 83020 HEMOGLOBIN ELECTROPHORESIS: CPT | Mod: 90

## 2023-12-12 PROCEDURE — 82728 ASSAY OF FERRITIN: CPT

## 2023-12-12 PROCEDURE — 85660 RBC SICKLE CELL TEST: CPT | Mod: 90

## 2023-12-12 PROCEDURE — 83021 HEMOGLOBIN CHROMOTOGRAPHY: CPT | Mod: 90

## 2023-12-12 PROCEDURE — 84630 ASSAY OF ZINC: CPT | Mod: 90

## 2023-12-12 PROCEDURE — 83540 ASSAY OF IRON: CPT

## 2023-12-12 PROCEDURE — 83615 LACTATE (LD) (LDH) ENZYME: CPT

## 2023-12-12 PROCEDURE — 99000 SPECIMEN HANDLING OFFICE-LAB: CPT

## 2023-12-13 LAB
ALBUMIN SERPL BCG-MCNC: 4.6 G/DL (ref 3.5–5.2)
ALP SERPL-CCNC: 83 U/L (ref 40–150)
ALT SERPL W P-5'-P-CCNC: 24 U/L (ref 0–70)
ANION GAP SERPL CALCULATED.3IONS-SCNC: 11 MMOL/L (ref 7–15)
AST SERPL W P-5'-P-CCNC: 23 U/L (ref 0–45)
BILIRUB SERPL-MCNC: 0.3 MG/DL
BUN SERPL-MCNC: 25.9 MG/DL (ref 8–23)
CALCIUM SERPL-MCNC: 9.6 MG/DL (ref 8.8–10.2)
CHLORIDE SERPL-SCNC: 100 MMOL/L (ref 98–107)
COPPER SERPL-MCNC: 133.4 UG/DL
CREAT SERPL-MCNC: 0.94 MG/DL (ref 0.67–1.17)
DEPRECATED HCO3 PLAS-SCNC: 25 MMOL/L (ref 22–29)
EGFRCR SERPLBLD CKD-EPI 2021: 86 ML/MIN/1.73M2
FERRITIN SERPL-MCNC: 170 NG/ML (ref 31–409)
GLUCOSE SERPL-MCNC: 92 MG/DL (ref 70–99)
HAPTOGLOB SERPL-MCNC: 239 MG/DL (ref 30–200)
HGB A1 MFR BLD: 97.3 %
HGB A2 MFR BLD: 2.4 %
HGB C MFR BLD: 0 %
HGB E MFR BLD: 0 %
HGB F MFR BLD: 0.3 %
HGB FRACT BLD ELPH-IMP: NORMAL
HGB OTHER MFR BLD: 0 %
HGB S BLD QL SOLY: NORMAL
HGB S MFR BLD: 0 %
IRON BINDING CAPACITY (ROCHE): 340 UG/DL (ref 240–430)
IRON SATN MFR SERPL: 21 % (ref 15–46)
IRON SERPL-MCNC: 73 UG/DL (ref 61–157)
LDH SERPL L TO P-CCNC: 170 U/L (ref 0–250)
PATH INTERP BLD-IMP: NORMAL
PATH REPORT.COMMENTS IMP SPEC: NORMAL
PATH REPORT.COMMENTS IMP SPEC: NORMAL
PATH REPORT.FINAL DX SPEC: NORMAL
PATH REPORT.MICROSCOPIC SPEC OTHER STN: NORMAL
PATH REPORT.MICROSCOPIC SPEC OTHER STN: NORMAL
PATH REPORT.RELEVANT HX SPEC: NORMAL
POTASSIUM SERPL-SCNC: 5 MMOL/L (ref 3.4–5.3)
PROT SERPL-MCNC: 7.4 G/DL (ref 6.4–8.3)
SODIUM SERPL-SCNC: 136 MMOL/L (ref 135–145)
TTG IGA SER-ACNC: 0.8 U/ML
TTG IGG SER-ACNC: 0.9 U/ML
VIT B12 SERPL-MCNC: 623 PG/ML (ref 232–1245)
ZINC SERPL-MCNC: 83.2 UG/DL

## 2023-12-15 ENCOUNTER — PRE VISIT (OUTPATIENT)
Dept: ONCOLOGY | Facility: CLINIC | Age: 73
End: 2023-12-15
Payer: COMMERCIAL

## 2023-12-15 LAB — FOLATE RBC-MCNC: 885 NG/ML

## 2024-01-03 ENCOUNTER — VIRTUAL VISIT (OUTPATIENT)
Dept: ONCOLOGY | Facility: CLINIC | Age: 74
End: 2024-01-03
Payer: COMMERCIAL

## 2024-01-03 ENCOUNTER — LAB (OUTPATIENT)
Dept: LAB | Facility: CLINIC | Age: 74
End: 2024-01-03
Payer: COMMERCIAL

## 2024-01-03 DIAGNOSIS — D64.9 NORMOCYTIC ANEMIA: ICD-10-CM

## 2024-01-03 DIAGNOSIS — D64.9 NORMOCYTIC ANEMIA: Primary | ICD-10-CM

## 2024-01-03 DIAGNOSIS — Z71.41 ALCOHOL CESSATION COUNSELING: ICD-10-CM

## 2024-01-03 LAB
TOTAL PROTEIN SERUM FOR ELP: 7 G/DL (ref 6.4–8.3)
TSH SERPL DL<=0.005 MIU/L-ACNC: 1.06 UIU/ML (ref 0.3–4.2)

## 2024-01-03 PROCEDURE — 84165 PROTEIN E-PHORESIS SERUM: CPT | Performed by: PATHOLOGY

## 2024-01-03 PROCEDURE — 82784 ASSAY IGA/IGD/IGG/IGM EACH: CPT

## 2024-01-03 PROCEDURE — 83521 IG LIGHT CHAINS FREE EACH: CPT

## 2024-01-03 PROCEDURE — 84443 ASSAY THYROID STIM HORMONE: CPT

## 2024-01-03 PROCEDURE — 99213 OFFICE O/P EST LOW 20 MIN: CPT | Mod: 95 | Performed by: INTERNAL MEDICINE

## 2024-01-03 PROCEDURE — 86334 IMMUNOFIX E-PHORESIS SERUM: CPT | Performed by: PATHOLOGY

## 2024-01-03 PROCEDURE — 36415 COLL VENOUS BLD VENIPUNCTURE: CPT

## 2024-01-03 PROCEDURE — 84155 ASSAY OF PROTEIN SERUM: CPT

## 2024-01-03 NOTE — LETTER
1/3/2024         RE: Dominguez Pittman  [address]  [place] [address]        Dear Colleague,    Thank you for referring your patient, Dominguez Pittman, to the [facility]. Please see a copy of my visit note below.    Video-Visit Details     Video Start Time: 10:04AM     Type of service:  Video Visit     Video End Time: 10:27AM    Originating Location (pt. Location): Home     Distant Location (provider location):  [facility] off site     Platform used for Video Visit: Well     [facility]    Hematology/Oncology Established Patient Follow-Up Note      Today's Date: 1/3/2024    Reason for follow-up: chronic anemia     HISTORY OF PRESENT ILLNESS: Dominguez Pittman is a 73 year old male who presents for follow-up    Patient has medical history including hypertension, hyperlipidemia, varicose veins, arthritis, inguinal hernia, abdominal aortic aneurysm, sigmoid colon diverticulosis, ED, frequent alcohol use, history of tobacco use (2861-8095, half pack per day)    Regarding anemia:  Pt if interested in blood and platelet donation and has been unable to do so 2/2 anemia. He reports he has rare platelets and wants to donate because of this. Due to this, pt has been taking oral ferrous 325mg PO x1 year with food and [place] vitamins with iron. He is a retired  and was unable to donate at that time and is now retired.     - pt reports he was told his hgb was low even in 1980  - 2008 hemoglobin 9.8-12.8, 4/2011 hemoglobin 13.1, 10/2019-9/2023 hemoglobin 11-12, 9/20/2023 hemoglobin 12.0, hematocrit 36.9, MCV 92  - 9/23 WBC and platelet normal, Cr 0.89  - 8/2020 ferritin 35, iron saturation index 20, TIBC 398, iron 79        -8/2017 colonoscopy: Diverticulosis in sigmoid colon, follow-up 10 years    Pt denies mucosal bleeding, melena, hematochezia, hematuria, hematemesis, purpura, ecchymosis.   Pt denies frequent/excess use of  NSAIDs, blood thinners. He takes aspirin 81mg PO daily  He tested positive for COVID 12/3/23 and has been taking advil 600mg daily since that time     Pt reports he is following GoLo diet w/release tablet that contains plant extracts. Is following balanced diet including animal products including red meat infrequently.     Pt reports alcohol use 4-5 days per week, with 2-3 beers or glasses of wine per drinking day' sometimes on the weekend 5-6 drinks per day.    INTERIM HISTORY:  Pt has been trying to cut back on drinking alcohol, trying to substitute with soda water   Pt leaving town to go to [place] 1/9/24-3/5/24    REVIEW OF SYSTEMS:   A 14 point ROS was reviewed with pertinent positives and negatives in the HPI.        HOME MEDICATIONS:  Current Outpatient Medications   Medication Sig Dispense Refill     amitriptyline (ELAVIL) 25 MG tablet TAKE 1 TO 2 TABLETS BY MOUTH AT BEDTIME 180 tablet 2     ASPIRIN PO Take 81 mg by mouth daily       atorvastatin (LIPITOR) 20 MG tablet Take 1 tablet (20 mg) by mouth daily 90 tablet 3     brimonidine (ALPHAGAN) 0.2 % ophthalmic solution PLACE 1 DROP INTO INTO LEFT EYE TWICE A DAY (Patient not taking: Reported on 12/6/2023)       fish oil-omega-3 fatty acids 1000 MG capsule Take 2 capsules by mouth daily. 180 capsule 12     GLUCOSAMINE SULFATE PO Take 1,500 mg by mouth daily.         lisinopril-hydrochlorothiazide (ZESTORETIC) 10-12.5 MG tablet Take 1 tablet by mouth daily 90 tablet 3     MULTI-VITAMIN OR TABS 1 q day   0     prednisoLONE acetate (PRED FORTE) 1 % ophthalmic suspension INSTILL 1 (ONE) DROP INTO LEFT EYE 4 TIMES DAILY (Patient not taking: Reported on 12/6/2023)       trimethoprim-polymyxin b (POLYTRIM) 05858-0.1 UNIT/ML-% ophthalmic solution INSTILL 1 (ONE) DROP INTO LEFT EYE 4 TIMES DAILY (Patient not taking: Reported on 12/6/2023)       VITAMIN D, CHOLECALCIFEROL, PO Take by mouth daily           ALLERGIES:  Allergies   Allergen Reactions     Penicillins Hives          PAST MEDICAL HISTORY:  Past Medical History:   Diagnosis Date     Arthritis      High cholesterol      Hx musculoskletl dis NEC      Hypertension      Other nonspecific findings on examination of blood(790.99)      PAC (premature atrial contraction)      Personal history of other disorders of nervous system and sense organs      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          PAST SURGICAL HISTORY:  Past Surgical History:   Procedure Laterality Date     ARTHRODESIS WRIST Left 03/18/2016    Procedure: ARTHRODESIS WRIST;  Surgeon: Mayda Teresa MD;  Location: [facility]     ARTHRODESIS WRIST Left 12/21/2016    Procedure: ARTHRODESIS WRIST;  Surgeon: Mayda Teresa MD;  Location: [facility]     COLONOSCOPY  07/27/2012    Procedure: COLONOSCOPY;  COLONOSCOPY;  Surgeon: Kris Garcia MD;  Location:  [facility]     COLONOSCOPY N/A 08/01/2017    Procedure: COLONOSCOPY;  COLONOSCOPY;  Surgeon: Emil Plaza MD;  Location: [facility]     ENDOSCOPIC RELEASE CARPAL TUNNEL Left 03/18/2016    Procedure: ENDOSCOPIC RELEASE CARPAL TUNNEL;  Surgeon: Mayda Teresa MD;  Location: [facility]     EYE SURGERY  2016 2020     JOINT REPLACEMTN, KNEE RT/LT  2007    Joint Replacement knee LT, right hip replacment      REMOVE HARDWARE WRIST Left 12/21/2016    Procedure: REMOVE HARDWARE WRIST;  Surgeon: Mayda Teresa MD;  Location: [facility]     SOFT TISSUE SURGERY  2019 2020     SURGICAL HISTORY OF -       arthroscopyx 3     SURGICAL HISTORY OF -       lt cholesteotoma     ZZC NONSPECIFIC PROCEDURE      diskectomy L5s         SOCIAL HISTORY:  Social History     Socioeconomic History     Marital status:      Spouse name: julio     Number of children: 2     Years of education: 16     Highest education level: Not on file   Occupational History     Occupation: [employer]     Employer: [employer]   Tobacco Use     Smoking status: Former     Packs/day: 0.50      Years: 2.00     Additional pack years: 0.00     Total pack years: 1.00     Types: Cigarettes     Start date: 1968     Quit date: 1972     Years since quittin.9     Smokeless tobacco: Never   Substance and Sexual Activity     Alcohol use: Yes     Alcohol/week: 10.0 standard drinks of alcohol     Comment: 3times/week     Drug use: No     Sexual activity: Yes     Partners: Female   Other Topics Concern      Service No     Blood Transfusions No     Caffeine Concern No     Occupational Exposure No     Hobby Hazards No     Sleep Concern Yes     Stress Concern No     Weight Concern No     Special Diet No     Back Care Yes     Comment: surgery 2000   L5  S2     Exercise Yes     Bike Helmet Yes     Seat Belt Yes     Self-Exams Yes     Parent/sibling w/ CABG, MI or angioplasty before 65F 55M? No   Social History Narrative     Not on file     Social Determinants of Health     Financial Resource Strain: Low Risk  (2023)    Financial Resource Strain      Within the past 12 months, have you or your family members you live with been unable to get utilities (heat, electricity) when it was really needed?: No   Food Insecurity: Low Risk  (2023)    Food Insecurity      Within the past 12 months, did you worry that your food would run out before you got money to buy more?: No      Within the past 12 months, did the food you bought just not last and you didn t have money to get more?: No   Transportation Needs: Low Risk  (2023)    Transportation Needs      Within the past 12 months, has lack of transportation kept you from medical appointments, getting your medicines, non-medical meetings or appointments, work, or from getting things that you need?: No   Physical Activity: Not on file   Stress: Not on file   Social Connections: Not on file   Interpersonal Safety: Low Risk  (10/6/2023)    Interpersonal Safety      Do you feel physically and emotionally safe where you currently live?: Yes      Within  the past 12 months, have you been hit, slapped, kicked or otherwise physically hurt by someone?: No      Within the past 12 months, have you been humiliated or emotionally abused in other ways by your partner or ex-partner?: No   Housing Stability: Low Risk  (9/20/2023)    Housing Stability      Do you have housing? : Yes      Are you worried about losing your housing?: No         FAMILY HISTORY:  Family History   Problem Relation Age of Onset     Hyperlipidemia Father      C.A.D. Paternal Grandfather         MI         PHYSICAL EXAM:  Vital signs:  There were no vitals taken for this visit.         LABS:   Latest Reference Range & Units 12/12/23 14:42   Sodium 135 - 145 mmol/L 136   Potassium 3.4 - 5.3 mmol/L 5.0   Chloride 98 - 107 mmol/L 100   Carbon Dioxide (CO2) 22 - 29 mmol/L 25   Urea Nitrogen 8.0 - 23.0 mg/dL 25.9 (H)   Creatinine 0.67 - 1.17 mg/dL 0.94   GFR Estimate >60 mL/min/1.73m2 86   Calcium 8.8 - 10.2 mg/dL 9.6   Anion Gap 7 - 15 mmol/L 11   Albumin 3.5 - 5.2 g/dL 4.6   Protein Total 6.4 - 8.3 g/dL 7.4   Alkaline Phosphatase 40 - 150 U/L 83   ALT 0 - 70 U/L 24   AST 0 - 45 U/L 23   Bilirubin Total <=1.2 mg/dL 0.3   Copper 70.0 - 140.0 ug/dL 133.4   Ferritin 31 - 409 ng/mL 170   RBC FOLATE  Rpt !   Glucose 70 - 99 mg/dL 92   Hemoglobin A 95.0 - 97.9 % 97.3   Hemoglobin A2 2.0 - 3.5 % 2.4   Hemoglobin C 0.0 - 0.0 % 0.0   Hemoglobin E 0.0 - 0.0 % 0.0   Hemoglobin F 0.0 - 2.1 % 0.3   Hemoglobin S 0.0 - 0.0 % 0.0   Hemoglobin Other 0.0 - 0.0 % 0.0   Iron 61 - 157 ug/dL 73   Iron Binding Capacity 240 - 430 ug/dL 340   Iron Sat Index 15 - 46 % 21   Lactate Dehydrogenase 0 - 250 U/L 170   Tissue Transglutaminase Antibody IgA <7.0 U/mL 0.8   Tissue Transglutaminase Josselyn IgG <7.0 U/mL 0.9   Vitamin B12 232 - 1,245 pg/mL 623   Zinc 60.0 - 120.0 ug/dL 83.2   WBC 4.0 - 11.0 10e3/uL 8.0   Hemoglobin 13.3 - 17.7 g/dL 11.6 (L)   Hematocrit 40.0 - 53.0 %  40.0 - 53.0 % 35.1 (L)  35.1 (L)   Platelet Count 150 - 450  10e3/uL 327   RBC Count 4.40 - 5.90 10e6/uL 3.78 (L)   MCV 78 - 100 fL 93   MCH 26.5 - 33.0 pg 30.7   MCHC 31.5 - 36.5 g/dL 33.0   RDW 10.0 - 15.0 % 13.0   % Neutrophils % 70   % Lymphocytes % 22   % Monocytes % 7   % Eosinophils % 1   % Basophils % 1   Absolute Basophils 0.0 - 0.2 10e3/uL 0.1   Absolute Eosinophils 0.0 - 0.7 10e3/uL 0.1   Absolute Immature Granulocytes <=0.4 10e3/uL 0.0   Absolute Lymphocytes 0.8 - 5.3 10e3/uL 1.8   Absolute Monocytes 0.0 - 1.3 10e3/uL 0.5   % Immature Granulocytes % 0   Absolute Neutrophils 1.6 - 8.3 10e3/uL 5.6   % Retic 0.5 - 2.0 % 1.2   Absolute Retic 0.025 - 0.095 10e6/uL 0.044   Color Urine Colorless, Straw, Light Yellow, Yellow  Yellow   Appearance Urine Clear  Clear   Glucose Urine Negative mg/dL Negative   Bilirubin Urine Negative  Negative   Ketones Urine Negative mg/dL Negative   Specific Gravity Urine 1.003 - 1.035  1.020   pH Urine 5.0 - 7.0  5.5   Protein Albumin Urine Negative mg/dL Negative   Urobilinogen Urine 0.2, 1.0 E.U./dL 0.2   Nitrite Urine Negative  Negative   Blood Urine Negative  Negative   Leukocyte Esterase Urine Negative  Negative   WBC Urine 0-5 /HPF /HPF 0-5   RBC Urine 0-2 /HPF /HPF 0-2   Haptoglobin 30 - 200 mg/dL 239 (H)   BLOOD MORPHOLOGY PATHOLOGIST REVIEW  Rpt   Hemoglobin Evaluation  See Note   Hemoglobin, Capillary Electrophoresis  Not Performed   LAB BLOOD MORPHOLOGY PATHOLOGIST REVIEW  Rpt !   Sickle Cell Solubility  Not Performed   (H): Data is abnormally high  !: Data is abnormal  (L): Data is abnormally low  Rpt: View report in Results Review for more information    PATHOLOGY:      IMAGING:      ASSESSMENT/PLAN:  Dominguez Pittman is a 73 year old male with:    # Chronic normocytic anemia  - pt reports anemia since 1975  - 2008 hemoglobin 9.8-12.8, 4/2011 hemoglobin 13.1, 10/2019-9/2023 hemoglobin 11-12, 9/2023 hemoglobin 12.0, hematocrit 36.9, MCV 92  - 8/2017 colonoscopy: Diverticulosis in sigmoid colon, follow-up 10 years  -12/23  labs:  CBC: Hemoglobin 11.6, hematocrit 35.1, MCV 93  Ferritin 170, iron saturation index 21, TIBC 340, iron 73  B12 623, RBC folate 885, copper normal, zinc normal  CMP WNL, absolute retic 0.044, , haptoglobin 239  Peripheral smear: Mild normochromic, normocytic anemia without evidence of red cell regeneration    UA negative for blood and RBC  celiac screen with tissue transglutaminase IgG and IgA negative  Hgb electrophoresis: Hemoglobin A 97.3, hemoglobin A2 2.4%, hemoglobin F 0.3%    PLAN:  - No reversible or sinister etiology found on the above workup  - check the following labs:  TSH  SPEP, SIFE, 24 hour urine UPEP and UIFE, kappa and lambda light chain ratio, quantitative serum immunoglobulins  - can continue to oral iron daily (taking at bedtime) given iron sat 21%   - recommend alcohol cessation as this may be contributing to marrow suppression; pt would like to start complete abstinence when he returns from [place] in 3/24, will do this for at least 3 months and repeat CBC to see if counts have improved    #frequent alcohol use  - discussed alcohol cessation, pt agreeable    RTC mid March for follow up with me, labs prior to visit  Pt leaving town to go to [place] 1/9/24-3/5/24    Jose Syed DO  Hematology/Oncology  [facility]      Again, thank you for allowing me to participate in the care of your patient.        Sincerely,        JOSE SYED DO

## 2024-01-03 NOTE — LETTER
1/3/2024         RE: Dominguez Pittman  [id number]  [place] [address]        Dear Colleague,    Thank you for referring your patient, Dominguez Pittman, to the [facility]. Please see a copy of my visit note below.    Video-Visit Details     Video Start Time: 10:04AM     Type of service:  Video Visit     Video End Time: 10:27AM    Originating Location (pt. Location): Home     Distant Location (provider location):  [facility] off site     Platform used for Video Visit: Well     [facility]    Hematology/Oncology Established Patient Follow-Up Note      Today's Date: 1/3/2024    Reason for follow-up: chronic anemia     HISTORY OF PRESENT ILLNESS: Dominguez Pittman is a 73 year old male who presents for follow-up    Patient has medical history including hypertension, hyperlipidemia, varicose veins, arthritis, inguinal hernia, abdominal aortic aneurysm, sigmoid colon diverticulosis, ED, frequent alcohol use, history of tobacco use (0607-6633, half pack per day)    Regarding anemia:  Pt if interested in blood and platelet donation and has been unable to do so 2/2 anemia. He reports he has rare platelets and wants to donate because of this. Due to this, pt has been taking oral ferrous 325mg PO x1 year with food and [place] vitamins with iron. He is a retired  and was unable to donate at that time and is now retired.     - pt reports he was told his hgb was low even in 1980  - 2008 hemoglobin 9.8-12.8, 4/2011 hemoglobin 13.1, 10/2019-9/2023 hemoglobin 11-12, 9/20/2023 hemoglobin 12.0, hematocrit 36.9, MCV 92  - 9/23 WBC and platelet normal, Cr 0.89  - 8/2020 ferritin 35, iron saturation index 20, TIBC 398, iron 79        -8/2017 colonoscopy: Diverticulosis in sigmoid colon, follow-up 10 years    Pt denies mucosal bleeding, melena, hematochezia, hematuria, hematemesis, purpura, ecchymosis.   Pt denies frequent/excess use of  NSAIDs, blood thinners. He takes aspirin 81mg PO daily  He tested positive for COVID 12/3/23 and has been taking advil 600mg daily since that time     Pt reports he is following GoLo diet w/release tablet that contains plant extracts. Is following balanced diet including animal products including red meat infrequently.     Pt reports alcohol use 4-5 days per week, with 2-3 beers or glasses of wine per drinking day' sometimes on the weekend 5-6 drinks per day.    INTERIM HISTORY:  Pt has been trying to cut back on drinking alcohol, trying to substitute with soda water   Pt leaving town to go to [place] 1/9/24-3/5/24    REVIEW OF SYSTEMS:   A 14 point ROS was reviewed with pertinent positives and negatives in the HPI.        HOME MEDICATIONS:  Current Outpatient Medications   Medication Sig Dispense Refill     amitriptyline (ELAVIL) 25 MG tablet TAKE 1 TO 2 TABLETS BY MOUTH AT BEDTIME 180 tablet 2     ASPIRIN PO Take 81 mg by mouth daily       atorvastatin (LIPITOR) 20 MG tablet Take 1 tablet (20 mg) by mouth daily 90 tablet 3     brimonidine (ALPHAGAN) 0.2 % ophthalmic solution PLACE 1 DROP INTO INTO LEFT EYE TWICE A DAY (Patient not taking: Reported on 12/6/2023)       fish oil-omega-3 fatty acids 1000 MG capsule Take 2 capsules by mouth daily. 180 capsule 12     GLUCOSAMINE SULFATE PO Take 1,500 mg by mouth daily.         lisinopril-hydrochlorothiazide (ZESTORETIC) 10-12.5 MG tablet Take 1 tablet by mouth daily 90 tablet 3     MULTI-VITAMIN OR TABS 1 q day   0     prednisoLONE acetate (PRED FORTE) 1 % ophthalmic suspension INSTILL 1 (ONE) DROP INTO LEFT EYE 4 TIMES DAILY (Patient not taking: Reported on 12/6/2023)       trimethoprim-polymyxin b (POLYTRIM) 90073-8.1 UNIT/ML-% ophthalmic solution INSTILL 1 (ONE) DROP INTO LEFT EYE 4 TIMES DAILY (Patient not taking: Reported on 12/6/2023)       VITAMIN D, CHOLECALCIFEROL, PO Take by mouth daily           ALLERGIES:  Allergies   Allergen Reactions     Penicillins Hives          PAST MEDICAL HISTORY:  Past Medical History:   Diagnosis Date     Arthritis      High cholesterol      Hx musculoskletl dis NEC      Hypertension      Other nonspecific findings on examination of blood(790.99)      PAC (premature atrial contraction)      Personal history of other disorders of nervous system and sense organs      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          PAST SURGICAL HISTORY:  Past Surgical History:   Procedure Laterality Date     ARTHRODESIS WRIST Left 03/18/2016    Procedure: ARTHRODESIS WRIST;  Surgeon: Mayda Teresa MD;  Location: [facility]     ARTHRODESIS WRIST Left 12/21/2016    Procedure: ARTHRODESIS WRIST;  Surgeon: Mayda Teresa MD;  Location: [facility]     COLONOSCOPY  07/27/2012    Procedure: COLONOSCOPY;  COLONOSCOPY;  Surgeon: Kris Garcia MD;  Location:  GI     COLONOSCOPY N/A 08/01/2017    Procedure: COLONOSCOPY;  COLONOSCOPY;  Surgeon: Emil Plaza MD;  Location: [facility]     ENDOSCOPIC RELEASE CARPAL TUNNEL Left 03/18/2016    Procedure: ENDOSCOPIC RELEASE CARPAL TUNNEL;  Surgeon: Mayda Teresa MD;  Location: [facility]     EYE SURGERY  2016 2020     JOINT REPLACEMTN, KNEE RT/LT  2007    Joint Replacement knee LT, right hip replacment      REMOVE HARDWARE WRIST Left 12/21/2016    Procedure: REMOVE HARDWARE WRIST;  Surgeon: Mayda Teresa MD;  Location: [facility]     SOFT TISSUE SURGERY  2019 2020     SURGICAL HISTORY OF -       arthroscopyx 3     SURGICAL HISTORY OF -       lt cholesteotoma     ZZC NONSPECIFIC PROCEDURE      diskectomy L5s         SOCIAL HISTORY:  Social History     Socioeconomic History     Marital status:      Spouse name: julio     Number of children: 2     Years of education: 16     Highest education level: Not on file   Occupational History     Occupation: [employer]     Employer: [email or web address]   Tobacco Use     Smoking status: Former     Packs/day: 0.50      Years: 2.00     Additional pack years: 0.00     Total pack years: 1.00     Types: Cigarettes     Start date: 1968     Quit date: 1972     Years since quittin.9     Smokeless tobacco: Never   Substance and Sexual Activity     Alcohol use: Yes     Alcohol/week: 10.0 standard drinks of alcohol     Comment: 3times/week     Drug use: No     Sexual activity: Yes     Partners: Female   Other Topics Concern      Service No     Blood Transfusions No     Caffeine Concern No     Occupational Exposure No     Hobby Hazards No     Sleep Concern Yes     Stress Concern No     Weight Concern No     Special Diet No     Back Care Yes     Comment: surgery 2000   L5  S2     Exercise Yes     Bike Helmet Yes     Seat Belt Yes     Self-Exams Yes     Parent/sibling w/ CABG, MI or angioplasty before 65F 55M? No   Social History Narrative     Not on file     Social Determinants of Health     Financial Resource Strain: Low Risk  (2023)    Financial Resource Strain      Within the past 12 months, have you or your family members you live with been unable to get utilities (heat, electricity) when it was really needed?: No   Food Insecurity: Low Risk  (2023)    Food Insecurity      Within the past 12 months, did you worry that your food would run out before you got money to buy more?: No      Within the past 12 months, did the food you bought just not last and you didn t have money to get more?: No   Transportation Needs: Low Risk  (2023)    Transportation Needs      Within the past 12 months, has lack of transportation kept you from medical appointments, getting your medicines, non-medical meetings or appointments, work, or from getting things that you need?: No   Physical Activity: Not on file   Stress: Not on file   Social Connections: Not on file   Interpersonal Safety: Low Risk  (10/6/2023)    Interpersonal Safety      Do you feel physically and emotionally safe where you currently live?: Yes      Within  the past 12 months, have you been hit, slapped, kicked or otherwise physically hurt by someone?: No      Within the past 12 months, have you been humiliated or emotionally abused in other ways by your partner or ex-partner?: No   Housing Stability: Low Risk  (9/20/2023)    Housing Stability      Do you have housing? : Yes      Are you worried about losing your housing?: No         FAMILY HISTORY:  Family History   Problem Relation Age of Onset     Hyperlipidemia Father      C.A.D. Paternal Grandfather         MI         PHYSICAL EXAM:  Vital signs:  There were no vitals taken for this visit.         LABS:   Latest Reference Range & Units 12/12/23 14:42   Sodium 135 - 145 mmol/L 136   Potassium 3.4 - 5.3 mmol/L 5.0   Chloride 98 - 107 mmol/L 100   Carbon Dioxide (CO2) 22 - 29 mmol/L 25   Urea Nitrogen 8.0 - 23.0 mg/dL 25.9 (H)   Creatinine 0.67 - 1.17 mg/dL 0.94   GFR Estimate >60 mL/min/1.73m2 86   Calcium 8.8 - 10.2 mg/dL 9.6   Anion Gap 7 - 15 mmol/L 11   Albumin 3.5 - 5.2 g/dL 4.6   Protein Total 6.4 - 8.3 g/dL 7.4   Alkaline Phosphatase 40 - 150 U/L 83   ALT 0 - 70 U/L 24   AST 0 - 45 U/L 23   Bilirubin Total <=1.2 mg/dL 0.3   Copper 70.0 - 140.0 ug/dL 133.4   Ferritin 31 - 409 ng/mL 170   RBC FOLATE  Rpt !   Glucose 70 - 99 mg/dL 92   Hemoglobin A 95.0 - 97.9 % 97.3   Hemoglobin A2 2.0 - 3.5 % 2.4   Hemoglobin C 0.0 - 0.0 % 0.0   Hemoglobin E 0.0 - 0.0 % 0.0   Hemoglobin F 0.0 - 2.1 % 0.3   Hemoglobin S 0.0 - 0.0 % 0.0   Hemoglobin Other 0.0 - 0.0 % 0.0   Iron 61 - 157 ug/dL 73   Iron Binding Capacity 240 - 430 ug/dL 340   Iron Sat Index 15 - 46 % 21   Lactate Dehydrogenase 0 - 250 U/L 170   Tissue Transglutaminase Antibody IgA <7.0 U/mL 0.8   Tissue Transglutaminase Josselyn IgG <7.0 U/mL 0.9   Vitamin B12 232 - 1,245 pg/mL 623   Zinc 60.0 - 120.0 ug/dL 83.2   WBC 4.0 - 11.0 10e3/uL 8.0   Hemoglobin 13.3 - 17.7 g/dL 11.6 (L)   Hematocrit 40.0 - 53.0 %  40.0 - 53.0 % 35.1 (L)  35.1 (L)   Platelet Count 150 - 450  10e3/uL 327   RBC Count 4.40 - 5.90 10e6/uL 3.78 (L)   MCV 78 - 100 fL 93   MCH 26.5 - 33.0 pg 30.7   MCHC 31.5 - 36.5 g/dL 33.0   RDW 10.0 - 15.0 % 13.0   % Neutrophils % 70   % Lymphocytes % 22   % Monocytes % 7   % Eosinophils % 1   % Basophils % 1   Absolute Basophils 0.0 - 0.2 10e3/uL 0.1   Absolute Eosinophils 0.0 - 0.7 10e3/uL 0.1   Absolute Immature Granulocytes <=0.4 10e3/uL 0.0   Absolute Lymphocytes 0.8 - 5.3 10e3/uL 1.8   Absolute Monocytes 0.0 - 1.3 10e3/uL 0.5   % Immature Granulocytes % 0   Absolute Neutrophils 1.6 - 8.3 10e3/uL 5.6   % Retic 0.5 - 2.0 % 1.2   Absolute Retic 0.025 - 0.095 10e6/uL 0.044   Color Urine Colorless, Straw, Light Yellow, Yellow  Yellow   Appearance Urine Clear  Clear   Glucose Urine Negative mg/dL Negative   Bilirubin Urine Negative  Negative   Ketones Urine Negative mg/dL Negative   Specific Gravity Urine 1.003 - 1.035  1.020   pH Urine 5.0 - 7.0  5.5   Protein Albumin Urine Negative mg/dL Negative   Urobilinogen Urine 0.2, 1.0 E.U./dL 0.2   Nitrite Urine Negative  Negative   Blood Urine Negative  Negative   Leukocyte Esterase Urine Negative  Negative   WBC Urine 0-5 /HPF /HPF 0-5   RBC Urine 0-2 /HPF /HPF 0-2   Haptoglobin 30 - 200 mg/dL 239 (H)   BLOOD MORPHOLOGY PATHOLOGIST REVIEW  Rpt   Hemoglobin Evaluation  See Note   Hemoglobin, Capillary Electrophoresis  Not Performed   LAB BLOOD MORPHOLOGY PATHOLOGIST REVIEW  Rpt !   Sickle Cell Solubility  Not Performed   (H): Data is abnormally high  !: Data is abnormal  (L): Data is abnormally low  Rpt: View report in Results Review for more information    PATHOLOGY:      IMAGING:      ASSESSMENT/PLAN:  Dominguez Pittman is a 73 year old male with:    # Chronic normocytic anemia  - pt reports anemia since 1975  - 2008 hemoglobin 9.8-12.8, 4/2011 hemoglobin 13.1, 10/2019-9/2023 hemoglobin 11-12, 9/2023 hemoglobin 12.0, hematocrit 36.9, MCV 92  - 8/2017 colonoscopy: Diverticulosis in sigmoid colon, follow-up 10 years  -12/23  labs:  CBC: Hemoglobin 11.6, hematocrit 35.1, MCV 93  Ferritin 170, iron saturation index 21, TIBC 340, iron 73  B12 623, RBC folate 885, copper normal, zinc normal  CMP WNL, absolute retic 0.044, , haptoglobin 239  Peripheral smear: Mild normochromic, normocytic anemia without evidence of red cell regeneration    UA negative for blood and RBC  celiac screen with tissue transglutaminase IgG and IgA negative  Hgb electrophoresis: Hemoglobin A 97.3, hemoglobin A2 2.4%, hemoglobin F 0.3%    PLAN:  - No reversible or sinister etiology found on the above workup  - check the following labs:  TSH  SPEP, SIFE, 24 hour urine UPEP and UIFE, kappa and lambda light chain ratio, quantitative serum immunoglobulins  - can continue to oral iron daily (taking at bedtime) given iron sat 21%   - recommend alcohol cessation as this may be contributing to marrow suppression; pt would like to start complete abstinence when he returns from [place] in 3/24, will do this for at least 3 months and repeat CBC to see if counts have improved    #frequent alcohol use  - discussed alcohol cessation, pt agreeable    RTC mid March for follow up with me, labs prior to visit  Pt leaving town to go to [place] 1/9/24-3/5/24    Jose Syed DO  Hematology/Oncology  [facility]      Again, thank you for allowing me to participate in the care of your patient.        Sincerely,        JOSE SYED DO

## 2024-01-03 NOTE — PROGRESS NOTES
Video-Visit Details     Video Start Time: 10:04AM     Type of service:  Video Visit     Video End Time: 10:27AM    Originating Location (pt. Location): Home     Distant Location (provider location):  [facility] off site     Platform used for Video Visit: [facility]    Hematology/Oncology Established Patient Follow-Up Note      Today's Date: 1/3/2024    Reason for follow-up: chronic anemia     HISTORY OF PRESENT ILLNESS: Dominguez Pittman is a 73 year old male who presents for follow-up    Patient has medical history including hypertension, hyperlipidemia, varicose veins, arthritis, inguinal hernia, abdominal aortic aneurysm, sigmoid colon diverticulosis, ED, frequent alcohol use, history of tobacco use (4573-0021, half pack per day)    Regarding anemia:  Pt if interested in blood and platelet donation and has been unable to do so 2/2 anemia. He reports he has rare platelets and wants to donate because of this. Due to this, pt has been taking oral ferrous 325mg PO x1 year with food and [place] vitamins with iron. He is a retired  and was unable to donate at that time and is now retired.     - pt reports he was told his hgb was low even in 1980  - 2008 hemoglobin 9.8-12.8, 4/2011 hemoglobin 13.1, 10/2019-9/2023 hemoglobin 11-12, 9/20/2023 hemoglobin 12.0, hematocrit 36.9, MCV 92  - 9/23 WBC and platelet normal, Cr 0.89  - 8/2020 ferritin 35, iron saturation index 20, TIBC 398, iron 79        -8/2017 colonoscopy: Diverticulosis in sigmoid colon, follow-up 10 years    Pt denies mucosal bleeding, melena, hematochezia, hematuria, hematemesis, purpura, ecchymosis.   Pt denies frequent/excess use of NSAIDs, blood thinners. He takes aspirin 81mg PO daily  He tested positive for COVID 12/3/23 and has been taking advil 600mg daily since that time     Pt reports he is following GoLo diet w/release tablet that contains plant extracts. Is following balanced diet including  animal products including red meat infrequently.     Pt reports alcohol use 4-5 days per week, with 2-3 beers or glasses of wine per drinking day' sometimes on the weekend 5-6 drinks per day.    INTERIM HISTORY:  Pt has been trying to cut back on drinking alcohol, trying to substitute with soda water   Pt leaving town to go to [place] 1/9/24-3/5/24    REVIEW OF SYSTEMS:   A 14 point ROS was reviewed with pertinent positives and negatives in the HPI.        HOME MEDICATIONS:  Current Outpatient Medications   Medication Sig Dispense Refill    amitriptyline (ELAVIL) 25 MG tablet TAKE 1 TO 2 TABLETS BY MOUTH AT BEDTIME 180 tablet 2    ASPIRIN PO Take 81 mg by mouth daily      atorvastatin (LIPITOR) 20 MG tablet Take 1 tablet (20 mg) by mouth daily 90 tablet 3    brimonidine (ALPHAGAN) 0.2 % ophthalmic solution PLACE 1 DROP INTO INTO LEFT EYE TWICE A DAY (Patient not taking: Reported on 12/6/2023)      fish oil-omega-3 fatty acids 1000 MG capsule Take 2 capsules by mouth daily. 180 capsule 12    GLUCOSAMINE SULFATE PO Take 1,500 mg by mouth daily.        lisinopril-hydrochlorothiazide (ZESTORETIC) 10-12.5 MG tablet Take 1 tablet by mouth daily 90 tablet 3    MULTI-VITAMIN OR TABS 1 q day   0    prednisoLONE acetate (PRED FORTE) 1 % ophthalmic suspension INSTILL 1 (ONE) DROP INTO LEFT EYE 4 TIMES DAILY (Patient not taking: Reported on 12/6/2023)      trimethoprim-polymyxin b (POLYTRIM) 51000-3.1 UNIT/ML-% ophthalmic solution INSTILL 1 (ONE) DROP INTO LEFT EYE 4 TIMES DAILY (Patient not taking: Reported on 12/6/2023)      VITAMIN D, CHOLECALCIFEROL, PO Take by mouth daily           ALLERGIES:  Allergies   Allergen Reactions    Penicillins Hives         PAST MEDICAL HISTORY:  Past Medical History:   Diagnosis Date    Arthritis     High cholesterol     Hx musculoskletl dis NEC     Hypertension     Other nonspecific findings on examination of blood(790.99)     PAC (premature atrial contraction)     Personal history of other  disorders of nervous system and sense organs     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          PAST SURGICAL HISTORY:  Past Surgical History:   Procedure Laterality Date    ARTHRODESIS WRIST Left 2016    Procedure: ARTHRODESIS WRIST;  Surgeon: Mayda Teresa MD;  Location: [facility]    ARTHRODESIS WRIST Left 2016    Procedure: ARTHRODESIS WRIST;  Surgeon: Mayda Teresa MD;  Location: [facility]    COLONOSCOPY  2012    Procedure: COLONOSCOPY;  COLONOSCOPY;  Surgeon: Kris Garcia MD;  Location:  [facility]    COLONOSCOPY N/A 2017    Procedure: COLONOSCOPY;  COLONOSCOPY;  Surgeon: Emil Plaza MD;  Location:  GI    ENDOSCOPIC RELEASE CARPAL TUNNEL Left 2016    Procedure: ENDOSCOPIC RELEASE CARPAL TUNNEL;  Surgeon: Mayda Teresa MD;  Location: [facility]    EYE SURGERY  2016    JOINT REPLACEMTN, KNEE RT/LT  2007    Joint Replacement knee LT, right hip replacment     REMOVE HARDWARE WRIST Left 2016    Procedure: REMOVE HARDWARE WRIST;  Surgeon: Mayda Teresa MD;  Location: [facility]    SOFT TISSUE SURGERY  2019    SURGICAL HISTORY OF -       arthroscopyx 3    SURGICAL HISTORY OF -       lt cholesteotoma    ZZC NONSPECIFIC PROCEDURE      diskectomy L5s         SOCIAL HISTORY:  Social History     Socioeconomic History    Marital status:      Spouse name: julio    Number of children: 2    Years of education: 16    Highest education level: Not on file   Occupational History    Occupation: [employer]     Employer: [employer]   Tobacco Use    Smoking status: Former     Packs/day: 0.50     Years: 2.00     Additional pack years: 0.00     Total pack years: 1.00     Types: Cigarettes     Start date: 1968     Quit date: 1972     Years since quittin.9    Smokeless tobacco: Never   Substance and Sexual Activity    Alcohol use: Yes     Alcohol/week: 10.0 standard drinks of alcohol      Comment: 3times/week    Drug use: No    Sexual activity: Yes     Partners: Female   Other Topics Concern     Service No    Blood Transfusions No    Caffeine Concern No    Occupational Exposure No    Hobby Hazards No    Sleep Concern Yes    Stress Concern No    Weight Concern No    Special Diet No    Back Care Yes     Comment: surgery 2000   L5  S2    Exercise Yes    Bike Helmet Yes    Seat Belt Yes    Self-Exams Yes    Parent/sibling w/ CABG, MI or angioplasty before 65F 55M? No   Social History Narrative    Not on file     Social Determinants of Health     Financial Resource Strain: Low Risk  (9/20/2023)    Financial Resource Strain     Within the past 12 months, have you or your family members you live with been unable to get utilities (heat, electricity) when it was really needed?: No   Food Insecurity: Low Risk  (9/20/2023)    Food Insecurity     Within the past 12 months, did you worry that your food would run out before you got money to buy more?: No     Within the past 12 months, did the food you bought just not last and you didn t have money to get more?: No   Transportation Needs: Low Risk  (9/20/2023)    Transportation Needs     Within the past 12 months, has lack of transportation kept you from medical appointments, getting your medicines, non-medical meetings or appointments, work, or from getting things that you need?: No   Physical Activity: Not on file   Stress: Not on file   Social Connections: Not on file   Interpersonal Safety: Low Risk  (10/6/2023)    Interpersonal Safety     Do you feel physically and emotionally safe where you currently live?: Yes     Within the past 12 months, have you been hit, slapped, kicked or otherwise physically hurt by someone?: No     Within the past 12 months, have you been humiliated or emotionally abused in other ways by your partner or ex-partner?: No   Housing Stability: Low Risk  (9/20/2023)    Housing Stability     Do you have housing? : Yes     Are you  worried about losing your housing?: No         FAMILY HISTORY:  Family History   Problem Relation Age of Onset    Hyperlipidemia Father     C.A.D. Paternal Grandfather         MI         PHYSICAL EXAM:  Vital signs:  There were no vitals taken for this visit.         LABS:   Latest Reference Range & Units 12/12/23 14:42   Sodium 135 - 145 mmol/L 136   Potassium 3.4 - 5.3 mmol/L 5.0   Chloride 98 - 107 mmol/L 100   Carbon Dioxide (CO2) 22 - 29 mmol/L 25   Urea Nitrogen 8.0 - 23.0 mg/dL 25.9 (H)   Creatinine 0.67 - 1.17 mg/dL 0.94   GFR Estimate >60 mL/min/1.73m2 86   Calcium 8.8 - 10.2 mg/dL 9.6   Anion Gap 7 - 15 mmol/L 11   Albumin 3.5 - 5.2 g/dL 4.6   Protein Total 6.4 - 8.3 g/dL 7.4   Alkaline Phosphatase 40 - 150 U/L 83   ALT 0 - 70 U/L 24   AST 0 - 45 U/L 23   Bilirubin Total <=1.2 mg/dL 0.3   Copper 70.0 - 140.0 ug/dL 133.4   Ferritin 31 - 409 ng/mL 170   RBC FOLATE  Rpt !   Glucose 70 - 99 mg/dL 92   Hemoglobin A 95.0 - 97.9 % 97.3   Hemoglobin A2 2.0 - 3.5 % 2.4   Hemoglobin C 0.0 - 0.0 % 0.0   Hemoglobin E 0.0 - 0.0 % 0.0   Hemoglobin F 0.0 - 2.1 % 0.3   Hemoglobin S 0.0 - 0.0 % 0.0   Hemoglobin Other 0.0 - 0.0 % 0.0   Iron 61 - 157 ug/dL 73   Iron Binding Capacity 240 - 430 ug/dL 340   Iron Sat Index 15 - 46 % 21   Lactate Dehydrogenase 0 - 250 U/L 170   Tissue Transglutaminase Antibody IgA <7.0 U/mL 0.8   Tissue Transglutaminase Josselyn IgG <7.0 U/mL 0.9   Vitamin B12 232 - 1,245 pg/mL 623   Zinc 60.0 - 120.0 ug/dL 83.2   WBC 4.0 - 11.0 10e3/uL 8.0   Hemoglobin 13.3 - 17.7 g/dL 11.6 (L)   Hematocrit 40.0 - 53.0 %  40.0 - 53.0 % 35.1 (L)  35.1 (L)   Platelet Count 150 - 450 10e3/uL 327   RBC Count 4.40 - 5.90 10e6/uL 3.78 (L)   MCV 78 - 100 fL 93   MCH 26.5 - 33.0 pg 30.7   MCHC 31.5 - 36.5 g/dL 33.0   RDW 10.0 - 15.0 % 13.0   % Neutrophils % 70   % Lymphocytes % 22   % Monocytes % 7   % Eosinophils % 1   % Basophils % 1   Absolute Basophils 0.0 - 0.2 10e3/uL 0.1   Absolute Eosinophils 0.0 - 0.7 10e3/uL 0.1    Absolute Immature Granulocytes <=0.4 10e3/uL 0.0   Absolute Lymphocytes 0.8 - 5.3 10e3/uL 1.8   Absolute Monocytes 0.0 - 1.3 10e3/uL 0.5   % Immature Granulocytes % 0   Absolute Neutrophils 1.6 - 8.3 10e3/uL 5.6   % Retic 0.5 - 2.0 % 1.2   Absolute Retic 0.025 - 0.095 10e6/uL 0.044   Color Urine Colorless, Straw, Light Yellow, Yellow  Yellow   Appearance Urine Clear  Clear   Glucose Urine Negative mg/dL Negative   Bilirubin Urine Negative  Negative   Ketones Urine Negative mg/dL Negative   Specific Gravity Urine 1.003 - 1.035  1.020   pH Urine 5.0 - 7.0  5.5   Protein Albumin Urine Negative mg/dL Negative   Urobilinogen Urine 0.2, 1.0 E.U./dL 0.2   Nitrite Urine Negative  Negative   Blood Urine Negative  Negative   Leukocyte Esterase Urine Negative  Negative   WBC Urine 0-5 /HPF /HPF 0-5   RBC Urine 0-2 /HPF /HPF 0-2   Haptoglobin 30 - 200 mg/dL 239 (H)   BLOOD MORPHOLOGY PATHOLOGIST REVIEW  Rpt   Hemoglobin Evaluation  See Note   Hemoglobin, Capillary Electrophoresis  Not Performed   LAB BLOOD MORPHOLOGY PATHOLOGIST REVIEW  Rpt !   Sickle Cell Solubility  Not Performed   (H): Data is abnormally high  !: Data is abnormal  (L): Data is abnormally low  Rpt: View report in Results Review for more information    PATHOLOGY:      IMAGING:      ASSESSMENT/PLAN:  Dominguez Pittman is a 73 year old male with:    # Chronic normocytic anemia  - pt reports anemia since 1975  - 2008 hemoglobin 9.8-12.8, 4/2011 hemoglobin 13.1, 10/2019-9/2023 hemoglobin 11-12, 9/2023 hemoglobin 12.0, hematocrit 36.9, MCV 92  - 8/2017 colonoscopy: Diverticulosis in sigmoid colon, follow-up 10 years  -12/23 labs:  CBC: Hemoglobin 11.6, hematocrit 35.1, MCV 93  Ferritin 170, iron saturation index 21, TIBC 340, iron 73  B12 623, RBC folate 885, copper normal, zinc normal  CMP WNL, absolute retic 0.044, , haptoglobin 239  Peripheral smear: Mild normochromic, normocytic anemia without evidence of red cell regeneration    UA negative for blood  and RBC  celiac screen with tissue transglutaminase IgG and IgA negative  Hgb electrophoresis: Hemoglobin A 97.3, hemoglobin A2 2.4%, hemoglobin F 0.3%    PLAN:  - No reversible or sinister etiology found on the above workup  - check the following labs:  TSH  SPEP, SIFE, 24 hour urine UPEP and UIFE, kappa and lambda light chain ratio, quantitative serum immunoglobulins  - can continue to oral iron daily (taking at bedtime) given iron sat 21%   - recommend alcohol cessation as this may be contributing to marrow suppression; pt would like to start complete abstinence when he returns from [place] in 3/24, will do this for at least 3 months and repeat CBC to see if counts have improved    #frequent alcohol use  - discussed alcohol cessation, pt agreeable    RTC mid March for follow up with me, labs prior to visit  Pt leaving town to go to [place] 1/9/24-3/5/24    Jackelyn Hernandez DO  Hematology/Oncology  [facility]

## 2024-01-03 NOTE — NURSING NOTE
DISCHARGE PLAN:  Next appointments: See patient instruction section  Departure Mode: video  Accompanied by:    minutes for medical assistant discharge (face to face time)     Dominguez Pittman is here today for video visit.  Patient was not seen by medical assistant at time of the appointment.   Appointments scheduled for all follow ups requested. I called pt and coordinated appts with him. I also sent him an AVS and calendar of apps as he requested. See patient instructions and Oncologist's Progress note for further details. Questions and concerns addressed to patient's satisfaction. Patient verbalized and demonstrated understanding of plan.  Contact information provided and patient is encouraged to call with any that arise in the interim of care.    Hoa HOPKINS [facility]  [phone]  1/3/2024, 11:43 AM

## 2024-01-03 NOTE — PATIENT INSTRUCTIONS
Today:  Please follow up as scheduled below:    Please follow up with Dr. Hernandez.  Please complete labs prior to follow up appointment.    Lab Date/Time: 03/13/24 @ 9:00 am in [facility]    Office visit follow up with Dr. Hernandez in [place] on Date/Time: 03/15/24 @ 10:00 am      If you have any questions or concerns please feel free to call.    If you need to reschedule please call:  Clinic or Lab Appointment - [phone]  Infusion - [phone]  Imaging - [phone]    Hoa Kim CMA  [facility]   Oncology/Hematology  [facility]  [phone]    After Hours Oncology Nurse Line - [phone]

## 2024-01-04 LAB
ALBUMIN SERPL ELPH-MCNC: 4.4 G/DL (ref 3.7–5.1)
ALPHA1 GLOB SERPL ELPH-MCNC: 0.3 G/DL (ref 0.2–0.4)
ALPHA2 GLOB SERPL ELPH-MCNC: 0.7 G/DL (ref 0.5–0.9)
B-GLOBULIN SERPL ELPH-MCNC: 0.7 G/DL (ref 0.6–1)
GAMMA GLOB SERPL ELPH-MCNC: 0.8 G/DL (ref 0.7–1.6)
IGA SERPL-MCNC: 190 MG/DL (ref 84–499)
IGG SERPL-MCNC: 942 MG/DL (ref 610–1616)
IGM SERPL-MCNC: 53 MG/DL (ref 35–242)
KAPPA LC FREE SER-MCNC: 2.25 MG/DL (ref 0.33–1.94)
KAPPA LC FREE/LAMBDA FREE SER NEPH: 1.32 {RATIO} (ref 0.26–1.65)
LAMBDA LC FREE SERPL-MCNC: 1.7 MG/DL (ref 0.57–2.63)
M PROTEIN SERPL ELPH-MCNC: 0 G/DL
PROT PATTERN SERPL ELPH-IMP: NORMAL
PROT PATTERN SERPL IFE-IMP: NORMAL

## 2024-01-05 ENCOUNTER — APPOINTMENT (OUTPATIENT)
Dept: LAB | Facility: CLINIC | Age: 74
End: 2024-01-05
Payer: COMMERCIAL

## 2024-01-05 PROCEDURE — 81050 URINALYSIS VOLUME MEASURE: CPT | Performed by: PATHOLOGY

## 2024-01-05 PROCEDURE — 84166 PROTEIN E-PHORESIS/URINE/CSF: CPT | Performed by: PATHOLOGY

## 2024-01-05 PROCEDURE — 86335 IMMUNFIX E-PHORSIS/URINE/CSF: CPT | Performed by: PATHOLOGY

## 2024-01-08 LAB
ALBUMIN MFR UR ELPH: 46.1 %
ALPHA1 GLOB MFR UR ELPH: 13.4 %
ALPHA2 GLOB MFR UR ELPH: 14 %
B-GLOBULIN MFR UR ELPH: 15.9 %
GAMMA GLOB MFR UR ELPH: 10.6 %
M PROTEIN MFR UR ELPH: 0 %
PROT ELPH PNL UR ELPH: NORMAL
PROT PATTERN UR ELPH-IMP: ABNORMAL

## 2024-03-15 ENCOUNTER — DOCUMENTATION ONLY (OUTPATIENT)
Dept: ONCOLOGY | Facility: CLINIC | Age: 74
End: 2024-03-15
Payer: COMMERCIAL

## 2024-03-15 NOTE — PROGRESS NOTES
Dominguez Pittman has an upcoming lab appointment:    Future Appointments   Date Time Provider Department Center   3/26/2024 10:30 AM EC LAB [facility]   3/28/2024  1:45 PM Jackelyn Hernandez [place] [facility]     Patient is scheduled for the following lab(s): pre-appt labs    There is no order available. Please review and place either future orders or HMPO (Review of Health Maintenance Protocol Orders), as appropriate.    Health Maintenance Due   Topic    LIPID      Joana Peter

## 2024-03-18 DIAGNOSIS — D64.9 NORMOCYTIC ANEMIA: Primary | ICD-10-CM

## 2024-03-26 ENCOUNTER — LAB (OUTPATIENT)
Dept: LAB | Facility: CLINIC | Age: 74
End: 2024-03-26
Payer: COMMERCIAL

## 2024-03-26 DIAGNOSIS — D64.9 NORMOCYTIC ANEMIA: ICD-10-CM

## 2024-03-26 LAB
BASOPHILS # BLD AUTO: 0 10E3/UL (ref 0–0.2)
BASOPHILS NFR BLD AUTO: 1 %
EOSINOPHIL # BLD AUTO: 0.2 10E3/UL (ref 0–0.7)
EOSINOPHIL NFR BLD AUTO: 3 %
ERYTHROCYTE [DISTWIDTH] IN BLOOD BY AUTOMATED COUNT: 12.9 % (ref 10–15)
FERRITIN SERPL-MCNC: 101 NG/ML (ref 31–409)
HCT VFR BLD AUTO: 36.9 % (ref 40–53)
HGB BLD-MCNC: 12.1 G/DL (ref 13.3–17.7)
IMM GRANULOCYTES # BLD: 0 10E3/UL
IMM GRANULOCYTES NFR BLD: 0 %
IRON BINDING CAPACITY (ROCHE): 272 UG/DL (ref 240–430)
IRON SATN MFR SERPL: 24 % (ref 15–46)
IRON SERPL-MCNC: 65 UG/DL (ref 61–157)
LYMPHOCYTES # BLD AUTO: 1.7 10E3/UL (ref 0.8–5.3)
LYMPHOCYTES NFR BLD AUTO: 28 %
MCH RBC QN AUTO: 30.6 PG (ref 26.5–33)
MCHC RBC AUTO-ENTMCNC: 32.8 G/DL (ref 31.5–36.5)
MCV RBC AUTO: 93 FL (ref 78–100)
MONOCYTES # BLD AUTO: 0.5 10E3/UL (ref 0–1.3)
MONOCYTES NFR BLD AUTO: 8 %
NEUTROPHILS # BLD AUTO: 3.7 10E3/UL (ref 1.6–8.3)
NEUTROPHILS NFR BLD AUTO: 61 %
PLATELET # BLD AUTO: 286 10E3/UL (ref 150–450)
RBC # BLD AUTO: 3.96 10E6/UL (ref 4.4–5.9)
RETICS # AUTO: 0.05 10E6/UL (ref 0.03–0.1)
RETICS/RBC NFR AUTO: 1.3 % (ref 0.5–2)
WBC # BLD AUTO: 6.1 10E3/UL (ref 4–11)

## 2024-03-26 PROCEDURE — 82728 ASSAY OF FERRITIN: CPT

## 2024-03-26 PROCEDURE — 85045 AUTOMATED RETICULOCYTE COUNT: CPT

## 2024-03-26 PROCEDURE — 36415 COLL VENOUS BLD VENIPUNCTURE: CPT

## 2024-03-26 PROCEDURE — 85025 COMPLETE CBC W/AUTO DIFF WBC: CPT

## 2024-03-26 PROCEDURE — 83540 ASSAY OF IRON: CPT

## 2024-03-26 PROCEDURE — 83550 IRON BINDING TEST: CPT

## 2024-03-28 ENCOUNTER — ONCOLOGY VISIT (OUTPATIENT)
Dept: ONCOLOGY | Facility: CLINIC | Age: 74
End: 2024-03-28
Attending: INTERNAL MEDICINE
Payer: COMMERCIAL

## 2024-03-28 VITALS
SYSTOLIC BLOOD PRESSURE: 138 MMHG | WEIGHT: 216.7 LBS | BODY MASS INDEX: 31.02 KG/M2 | HEIGHT: 70 IN | HEART RATE: 99 BPM | OXYGEN SATURATION: 95 % | DIASTOLIC BLOOD PRESSURE: 78 MMHG | RESPIRATION RATE: 16 BRPM

## 2024-03-28 DIAGNOSIS — D64.9 NORMOCYTIC ANEMIA: Primary | ICD-10-CM

## 2024-03-28 PROCEDURE — 99214 OFFICE O/P EST MOD 30 MIN: CPT | Performed by: INTERNAL MEDICINE

## 2024-03-28 PROCEDURE — G0463 HOSPITAL OUTPT CLINIC VISIT: HCPCS | Performed by: INTERNAL MEDICINE

## 2024-03-28 ASSESSMENT — PAIN SCALES - GENERAL: PAINLEVEL: NO PAIN (0)

## 2024-03-28 NOTE — LETTER
3/28/2024         RE: Dominguez Pittman  [address]  [place] [address]        Dear Colleague,    Thank you for referring your patient, Dominguez Pittman, to the [facility]. Please see a copy of my visit note below.    [facility]    Hematology/Oncology Established Patient Follow-Up Note      Today's Date: 3/28/2024    Reason for follow-up: chronic anemia     HISTORY OF PRESENT ILLNESS: Dominguez Pittman is a 73 year old male who presents for follow-up    Patient has medical history including hypertension, hyperlipidemia, varicose veins, arthritis, inguinal hernia, abdominal aortic aneurysm, sigmoid colon diverticulosis, ED, frequent alcohol use, history of tobacco use ([id number], half pack per day)    Regarding anemia:  Pt if interested in blood and platelet donation and has been unable to do so 2/2 anemia. He reports he has rare platelets and wants to donate because of this. Due to this, pt has been taking oral ferrous 325mg PO x1 year with food and [employer] vitamins with iron. He is a retired  and was unable to donate at that time and is now retired.     - pt reports he was told his hgb was low even in 1980  - 2008 hemoglobin 9.8-12.8, 4/2011 hemoglobin 13.1, 10/2019-9/2023 hemoglobin 11-12, 9/20/2023 hemoglobin 12.0, hematocrit 36.9, MCV 92  - 9/23 WBC and platelet normal, Cr 0.89  - 8/2020 ferritin 35, iron saturation index 20, TIBC 398, iron 79    -8/2017 colonoscopy: Diverticulosis in sigmoid colon, follow-up 10 years    Pt denies mucosal bleeding, melena, hematochezia, hematuria, hematemesis, purpura, ecchymosis.   Pt denies frequent/excess use of NSAIDs, blood thinners. He takes aspirin 81mg PO daily  He tested positive for COVID 12/3/23 and has been taking advil 600mg daily since that time     Pt reports he is following GoLo diet w/release tablet that contains plant extracts. Is following balanced diet including  animal products including red meat infrequently.     Pt reports alcohol use 4-5 days per week, with 2-3 beers or glasses of wine per drinking day' sometimes on the weekend 5-6 drinks per day.    INTERIM HISTORY:  Pt has been trying to cut back on drinking alcohol, trying to substitute with soda water   Pt was in [place] 1/9/24-3/5/24  Pt is taking oral iron ferrous sulfate 325mg nightly     REVIEW OF SYSTEMS:   A 14 point ROS was reviewed with pertinent positives and negatives in the HPI.        HOME MEDICATIONS:  Current Outpatient Medications   Medication Sig Dispense Refill     amitriptyline (ELAVIL) 25 MG tablet TAKE 1 TO 2 TABLETS BY MOUTH AT BEDTIME 180 tablet 2     ASPIRIN PO Take 81 mg by mouth daily       atorvastatin (LIPITOR) 20 MG tablet Take 1 tablet (20 mg) by mouth daily 90 tablet 3     Ferrous Sulfate (IRON PO) Take 65 mg by mouth at bedtime       fish oil-omega-3 fatty acids 1000 MG capsule Take 2 capsules by mouth daily. 180 capsule 12     GLUCOSAMINE SULFATE PO Take 1,500 mg by mouth daily.         lisinopril-hydrochlorothiazide (ZESTORETIC) 10-12.5 MG tablet Take 1 tablet by mouth daily 90 tablet 3     MULTI-VITAMIN OR TABS 1 q day   0     VITAMIN D, CHOLECALCIFEROL, PO Take by mouth daily           ALLERGIES:  Allergies   Allergen Reactions     Penicillins Hives         PAST MEDICAL HISTORY:  Past Medical History:   Diagnosis Date     Arthritis      High cholesterol      Hx musculoskletl dis NEC      Hypertension      Other nonspecific findings on examination of blood(790.99)      PAC (premature atrial contraction)      Personal history of other disorders of nervous system and sense organs      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          PAST SURGICAL HISTORY:  Past Surgical History:   Procedure Laterality Date     ARTHRODESIS WRIST Left 03/18/2016    Procedure: ARTHRODESIS WRIST;  Surgeon: Mayda Teresa MD;  Location: [facility]     ARTHRODESIS  WRIST Left 2016    Procedure: ARTHRODESIS WRIST;  Surgeon: Mayda Teresa MD;  Location: [facility]     COLONOSCOPY  2012    Procedure: COLONOSCOPY;  COLONOSCOPY;  Surgeon: Kris Garcia MD;  Location:  GI     COLONOSCOPY N/A 2017    Procedure: COLONOSCOPY;  COLONOSCOPY;  Surgeon: Emil Plaza MD;  Location:  GI     ENDOSCOPIC RELEASE CARPAL TUNNEL Left 2016    Procedure: ENDOSCOPIC RELEASE CARPAL TUNNEL;  Surgeon: Mayda Teresa MD;  Location: [facility]     EYE SURGERY  2016     JOINT REPLACEMTN, KNEE RT/LT  2007    Joint Replacement knee LT, right hip replacment      REMOVE HARDWARE WRIST Left 2016    Procedure: REMOVE HARDWARE WRIST;  Surgeon: Mayda Teresa MD;  Location: [facility]     SOFT TISSUE SURGERY  2019     SURGICAL HISTORY OF -       arthroscopyx 3     SURGICAL HISTORY OF -       lt cholesteotoma     ZZC NONSPECIFIC PROCEDURE      diskectomy L5s         SOCIAL HISTORY:  Social History     Socioeconomic History     Marital status:      Spouse name: julio     Number of children: 2     Years of education: 16     Highest education level: Not on file   Occupational History     Occupation: [employer]     Employer: [employer]   Tobacco Use     Smoking status: Former     Packs/day: 0.50     Years: 2.00     Additional pack years: 0.00     Total pack years: 1.00     Types: Cigarettes     Start date: 1968     Quit date: 1972     Years since quittin.1     Smokeless tobacco: Never   Substance and Sexual Activity     Alcohol use: Yes     Alcohol/week: 10.0 standard drinks of alcohol     Comment: 3times/week     Drug use: No     Sexual activity: Yes     Partners: Female   Other Topics Concern      Service No     Blood Transfusions No     Caffeine Concern No     Occupational Exposure No     Hobby Hazards No     Sleep Concern Yes     Stress Concern No     Weight Concern No     Special Diet No     Back Care Yes  "    Comment: surgery 2000   L5  S2     Exercise Yes     Bike Helmet Yes     Seat Belt Yes     Self-Exams Yes     Parent/sibling w/ CABG, MI or angioplasty before 65F 55M? No   Social History Narrative     Not on file     Social Determinants of Health     Financial Resource Strain: Low Risk  (9/20/2023)    Financial Resource Strain      Within the past 12 months, have you or your family members you live with been unable to get utilities (heat, electricity) when it was really needed?: No   Food Insecurity: Low Risk  (9/20/2023)    Food Insecurity      Within the past 12 months, did you worry that your food would run out before you got money to buy more?: No      Within the past 12 months, did the food you bought just not last and you didn t have money to get more?: No   Transportation Needs: Low Risk  (9/20/2023)    Transportation Needs      Within the past 12 months, has lack of transportation kept you from medical appointments, getting your medicines, non-medical meetings or appointments, work, or from getting things that you need?: No   Physical Activity: Not on file   Stress: Not on file   Social Connections: Not on file   Interpersonal Safety: Low Risk  (10/6/2023)    Interpersonal Safety      Do you feel physically and emotionally safe where you currently live?: Yes      Within the past 12 months, have you been hit, slapped, kicked or otherwise physically hurt by someone?: No      Within the past 12 months, have you been humiliated or emotionally abused in other ways by your partner or ex-partner?: No   Housing Stability: Low Risk  (9/20/2023)    Housing Stability      Do you have housing? : Yes      Are you worried about losing your housing?: No         FAMILY HISTORY:  Family History   Problem Relation Age of Onset     Hyperlipidemia Father      C.A.D. Paternal Grandfather         MI         PHYSICAL EXAM:  Vital signs:  /78 (BP Location: Left arm)   Pulse 99   Resp 16   Ht 1.786 m (5' 10.32\")   Wt " 98.3 kg (216 lb 11.2 oz)   SpO2 95%   BMI 30.82 kg/m       ECOG:   GENERAL/CONSTITUTIONAL: No acute distress.  EYES: Pupils are equal and round. Extraocular movements intact without nystagmus.  No scleral icterus.  RESPIRATORY: Equal chest rise.   MUSCULOSKELETAL: Warm and well-perfused, no cyanosis, clubbing, or edema.   NEUROLOGIC: Cranial nerves are grossly intact. Alert, oriented to person, place and time, answers questions appropriately.  INTEGUMENTARY: No rashes or jaundice.  GAIT: Steady, does not use assistive device      LABS:   Latest Reference Range & Units 12/12/23 14:42 01/03/24 14:14 01/05/24 10:08 03/26/24 10:37   Sodium 135 - 145 mmol/L 136      Potassium 3.4 - 5.3 mmol/L 5.0      Chloride 98 - 107 mmol/L 100      Carbon Dioxide (CO2) 22 - 29 mmol/L 25      Urea Nitrogen 8.0 - 23.0 mg/dL 25.9 (H)      Creatinine 0.67 - 1.17 mg/dL 0.94      GFR Estimate >60 mL/min/1.73m2 86      Calcium 8.8 - 10.2 mg/dL 9.6      Anion Gap 7 - 15 mmol/L 11      Albumin 3.5 - 5.2 g/dL 4.6      Protein Total 6.4 - 8.3 g/dL 7.4      Alkaline Phosphatase 40 - 150 U/L 83      ALT 0 - 70 U/L 24      AST 0 - 45 U/L 23      Bilirubin Total <=1.2 mg/dL 0.3      Copper 70.0 - 140.0 ug/dL 133.4      Ferritin 31 - 409 ng/mL 170   101   RBC FOLATE  Rpt !      Glucose 70 - 99 mg/dL 92      Hemoglobin A 95.0 - 97.9 % 97.3      Hemoglobin A2 2.0 - 3.5 % 2.4      Hemoglobin C 0.0 - 0.0 % 0.0      Hemoglobin E 0.0 - 0.0 % 0.0      Hemoglobin F 0.0 - 2.1 % 0.3      Hemoglobin S 0.0 - 0.0 % 0.0      Hemoglobin Other 0.0 - 0.0 % 0.0      Hemoglobin Evaluation by CE  See Note      Sickle Cell Solubility Reflex  Not Performed      Hgb Capillary Electrophoresis Reflex  Not Performed      Iron 61 - 157 ug/dL 73   65   Iron Binding Capacity 240 - 430 ug/dL 340   272   Iron Sat Index 15 - 46 % 21   24   Lactate Dehydrogenase 0 - 250 U/L 170      Tissue Transglutaminase Antibody IgA <7.0 U/mL 0.8      Tissue Transglutaminase Josselyn IgG <7.0 U/mL  0.9      TSH 0.30 - 4.20 uIU/mL  1.06     Vitamin B12 232 - 1,245 pg/mL 623      Zinc 60.0 - 120.0 ug/dL 83.2      WBC 4.0 - 11.0 10e3/uL 8.0   6.1   Hemoglobin 13.3 - 17.7 g/dL 11.6 (L)   12.1 (L)   Hematocrit 40.0 - 53.0 % 35.1 (L)  35.1 (L)   36.9 (L)   Platelet Count 150 - 450 10e3/uL 327   286   RBC Count 4.40 - 5.90 10e6/uL 3.78 (L)   3.96 (L)   MCV 78 - 100 fL 93   93   MCH 26.5 - 33.0 pg 30.7   30.6   MCHC 31.5 - 36.5 g/dL 33.0   32.8   RDW 10.0 - 15.0 % 13.0   12.9   % Neutrophils % 70   61   % Lymphocytes % 22   28   % Monocytes % 7   8   % Eosinophils % 1   3   % Basophils % 1   1   Absolute Basophils 0.0 - 0.2 10e3/uL 0.1   0.0   Absolute Eosinophils 0.0 - 0.7 10e3/uL 0.1   0.2   Absolute Immature Granulocytes <=0.4 10e3/uL 0.0   0.0   Absolute Lymphocytes 0.8 - 5.3 10e3/uL 1.8   1.7   Absolute Monocytes 0.0 - 1.3 10e3/uL 0.5   0.5   % Immature Granulocytes % 0   0   Absolute Neutrophils 1.6 - 8.3 10e3/uL 5.6   3.7   % Retic 0.5 - 2.0 % 1.2   1.3   Absolute Retic 0.025 - 0.095 10e6/uL 0.044   0.052   Color Urine Colorless, Straw, Light Yellow, Yellow  Yellow      Appearance Urine Clear  Clear      Glucose Urine Negative mg/dL Negative      Bilirubin Urine Negative  Negative      Ketones Urine Negative mg/dL Negative      Specific Gravity Urine 1.003 - 1.035  1.020      pH Urine 5.0 - 7.0  5.5      Protein Albumin Urine Negative mg/dL Negative      Urobilinogen Urine 0.2, 1.0 E.U./dL 0.2      Nitrite Urine Negative  Negative      Blood Urine Negative  Negative      Leukocyte Esterase Urine Negative  Negative      WBC Urine 0-5 /HPF /HPF 0-5      RBC Urine 0-2 /HPF /HPF 0-2      Albumin Fraction 3.7 - 5.1 g/dL  4.4     Albumin Fraction Urine <=0.0 %   46.1 (H)    Alpha 1 Fraction 0.2 - 0.4 g/dL  0.3     Alpha 1 Fraction Urine <=0.0 %   13.4 (H)    Alpha 2 Fraction 0.5 - 0.9 g/dL  0.7     Alpha 2 Fraction Urine <=0.0 %   14.0 (H)    Beta Fraction 0.6 - 1.0 g/dL  0.7     Beta Fraction Urine <=0.0 %   15.9  (H)    ELP Interpretation:   Essentially normal electrophoretic pattern. No obvious monoclonal proteins seen. Pathologic significance requires clinical correlation. ABI Galeano M.D., Ph.D., Pathologist.     ELP Interpretation Urine    Only trace albumin and trace globulins. No obvious monoclonal protein seen and the absence of a monoclonal immunoglobulin was confirmed by immunofixation of this same urine sample. Pathological significance requires clinical correlation.  ABI Galeano M.D., Ph.D., Pathologist    Gamma Fraction 0.7 - 1.6 g/dL  0.8     Gamma Fraction Urine <=0.0 %   10.6 (H)    Haptoglobin 30 - 200 mg/dL 239 (H)      IGA 84 - 499 mg/dL  190      - 1,616 mg/dL  942     IGM 35 - 242 mg/dL  53     Immunofix ELP Urine    No monoclonal protein seen on immunofixation. Pathologic significance requires clinical correlation. ABI Galeano M.D., Ph.D., Pathologist    Immunofixation ELP   No monoclonal protein seen on immunofixation. Pathologic significance requires clinical correlation. ABI Galeano M.D., Ph.D., Pathologist     Kappa Free Lt Chain 0.33 - 1.94 mg/dL  2.25 (H)     Kappa Lambda Ratio 0.26 - 1.65   1.32     Lambda Free Lt Chain 0.57 - 2.63 mg/dL  1.70     Monoclonal Peak <=0.0 g/dL  0.0     Monoclonal Peak Urine <=0.0 %   0.0    Total Protein Serum for ELP 6.4 - 8.3 g/dL  7.0     BLOOD MORPHOLOGY PATHOLOGIST REVIEW  Rpt      LAB BLOOD MORPHOLOGY PATHOLOGIST REVIEW  Rpt !      (H): Data is abnormally high  !: Data is abnormal  (L): Data is abnormally low  Rpt: View report in Results Review for more information    PATHOLOGY:      IMAGING:      ASSESSMENT/PLAN:  Dominguez Pittman is a 73 year old male with:    # Chronic normocytic anemia  - pt reports anemia since 1975  - 2008 hemoglobin 9.8-12.8, 4/2011 hemoglobin 13.1, 10/2019-9/2023 hemoglobin 11-12, 9/2023 hemoglobin 12.0, hematocrit 36.9, MCV 92  - 8/2017 colonoscopy: Diverticulosis in sigmoid colon, follow-up 10 years  -12/23 labs:  CBC:  Hemoglobin 11.6, hematocrit 35.1, MCV 93  Ferritin 170, iron saturation index 21, TIBC 340, iron 73  B12 623, RBC folate 885, copper normal, zinc normal  CMP WNL, absolute retic 0.044, , haptoglobin 239  Peripheral smear: Mild normochromic, normocytic anemia without evidence of red cell regeneration    UA negative for blood and RBC  celiac screen with tissue transglutaminase IgG and IgA negative  Hgb electrophoresis: Hemoglobin A 97.3, hemoglobin A2 2.4%, hemoglobin F 0.3%  -  labs:  TSH 1.06  1.  SIFE: no monoclonal protein  2.  SPEP: no monoclonal protein  3.  UIFE: no monoclonal protein  4.  UPEP: no monoclonal protein  5.  Kappa/lambda ratio: kappa 2.25, lambda 1.7, k/l ratio 1.32  6.  Quantitative immunoglobulins: Ig    IgA: 190    IgM: 53  - 3/24 labs:  Hgb 12.1, hematocrit 36.9, MCV 93  Ferritin 101, iron sat 24, TIBC 272, iron 73    PLAN:  - No reversible or sinister etiology found on the above workup  - oral iron started  iron sat 21%, improved to 24% , can continue for 3-6 additional months  - given pts hgb is 12 and has been since , can continue to observe  - discussed utility of bone marrow biopsy and we will hold off now   - can check CBC q6 months and if hgb drops significantly, will revisit further work up   - recommend alcohol cessation as this may be contributing to marrow suppression    #frequent alcohol use  - discussed alcohol cessation, pt agreeable    RTC 6 months for follow up with me, labs prior to visit    Jose Syed DO  Hematology/Oncology  [facility]      Again, thank you for allowing me to participate in the care of your patient.        Sincerely,        JOSE SYED DO

## 2024-03-28 NOTE — NURSING NOTE
"Oncology Rooming Note    March 28, 2024 1:54 PM   Dominguez Pittman is a 73 year old male who presents for:    Chief Complaint   Patient presents with    Oncology Clinic Visit     Follow up     Initial Vitals: /78 (BP Location: Left arm)   Pulse 99   Resp 16   Ht 1.786 m (5' 10.32\")   Wt 98.3 kg (216 lb 11.2 oz)   SpO2 95%   BMI 30.82 kg/m   Estimated body mass index is 30.82 kg/m  as calculated from the following:    Height as of this encounter: 1.786 m (5' 10.32\").    Weight as of this encounter: 98.3 kg (216 lb 11.2 oz). Body surface area is 2.21 meters squared.  No Pain (0) Comment: Data Unavailable   No LMP for male patient.  Allergies reviewed: Yes  Medications reviewed: Yes    Medications: Medication refills not needed today.  Pharmacy name entered into re3D:    [facility] [place] [address] [facility] [place] [facility] [address] [id number] [place] [facility] [address] [facility] [facility] [address] [facility] [place] [facility] [address] [facility] AT [address] TO [facility] SITES    Frailty Screening:   Is the patient here for a new oncology consult visit in cancer care? 2. No      Clinical concerns: results       Akua Humphrey LPN              "

## 2024-03-28 NOTE — PROGRESS NOTES
[facility]    Hematology/Oncology Established Patient Follow-Up Note      Today's Date: 3/28/2024    Reason for follow-up: chronic anemia     HISTORY OF PRESENT ILLNESS: Dominguez Pittamn is a 73 year old male who presents for follow-up    Patient has medical history including hypertension, hyperlipidemia, varicose veins, arthritis, inguinal hernia, abdominal aortic aneurysm, sigmoid colon diverticulosis, ED, frequent alcohol use, history of tobacco use ([id number], half pack per day)    Regarding anemia:  Pt if interested in blood and platelet donation and has been unable to do so 2/2 anemia. He reports he has rare platelets and wants to donate because of this. Due to this, pt has been taking oral ferrous 325mg PO x1 year with food and [place] vitamins with iron. He is a retired  and was unable to donate at that time and is now retired.     - pt reports he was told his hgb was low even in 1980  - 2008 hemoglobin 9.8-12.8, 4/2011 hemoglobin 13.1, 10/2019-9/2023 hemoglobin 11-12, 9/20/2023 hemoglobin 12.0, hematocrit 36.9, MCV 92  - 9/23 WBC and platelet normal, Cr 0.89  - 8/2020 ferritin 35, iron saturation index 20, TIBC 398, iron 79    -8/2017 colonoscopy: Diverticulosis in sigmoid colon, follow-up 10 years    Pt denies mucosal bleeding, melena, hematochezia, hematuria, hematemesis, purpura, ecchymosis.   Pt denies frequent/excess use of NSAIDs, blood thinners. He takes aspirin 81mg PO daily  He tested positive for COVID 12/3/23 and has been taking advil 600mg daily since that time     Pt reports he is following GoLo diet w/release tablet that contains plant extracts. Is following balanced diet including animal products including red meat infrequently.     Pt reports alcohol use 4-5 days per week, with 2-3 beers or glasses of wine per drinking day' sometimes on the weekend 5-6 drinks per day.    INTERIM HISTORY:  Pt has been trying to cut back on drinking alcohol, trying to  substitute with soda water   Pt was in [place] 1/9/24-3/5/24  Pt is taking oral iron ferrous sulfate 325mg nightly     REVIEW OF SYSTEMS:   A 14 point ROS was reviewed with pertinent positives and negatives in the HPI.        HOME MEDICATIONS:  Current Outpatient Medications   Medication Sig Dispense Refill    amitriptyline (ELAVIL) 25 MG tablet TAKE 1 TO 2 TABLETS BY MOUTH AT BEDTIME 180 tablet 2    ASPIRIN PO Take 81 mg by mouth daily      atorvastatin (LIPITOR) 20 MG tablet Take 1 tablet (20 mg) by mouth daily 90 tablet 3    Ferrous Sulfate (IRON PO) Take 65 mg by mouth at bedtime      fish oil-omega-3 fatty acids 1000 MG capsule Take 2 capsules by mouth daily. 180 capsule 12    GLUCOSAMINE SULFATE PO Take 1,500 mg by mouth daily.        lisinopril-hydrochlorothiazide (ZESTORETIC) 10-12.5 MG tablet Take 1 tablet by mouth daily 90 tablet 3    MULTI-VITAMIN OR TABS 1 q day   0    VITAMIN D, CHOLECALCIFEROL, PO Take by mouth daily           ALLERGIES:  Allergies   Allergen Reactions    Penicillins Hives         PAST MEDICAL HISTORY:  Past Medical History:   Diagnosis Date    Arthritis     High cholesterol     Hx musculoskletl dis NEC     Hypertension     Other nonspecific findings on examination of blood(790.99)     PAC (premature atrial contraction)     Personal history of other disorders of nervous system and sense organs     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          PAST SURGICAL HISTORY:  Past Surgical History:   Procedure Laterality Date    ARTHRODESIS WRIST Left 03/18/2016    Procedure: ARTHRODESIS WRIST;  Surgeon: Mayda Teresa MD;  Location: [facility]    ARTHRODESIS WRIST Left 12/21/2016    Procedure: ARTHRODESIS WRIST;  Surgeon: Mayda Teresa MD;  Location:  [facility]    COLONOSCOPY  07/27/2012    Procedure: COLONOSCOPY;  COLONOSCOPY;  Surgeon: Kris Garcia MD;  [facility]    COLONOSCOPY N/A 08/01/2017    Procedure: COLONOSCOPY;   COLONOSCOPY;  Surgeon: Emil Plaza MD;  Location:  [facility]    ENDOSCOPIC RELEASE CARPAL TUNNEL Left 2016    Procedure: ENDOSCOPIC RELEASE CARPAL TUNNEL;  Surgeon: Mayda Teresa MD;  Location: [facility]    EYE SURGERY  2016    JOINT REPLACEMTN, KNEE RT/LT  2007    Joint Replacement knee LT, right hip replacment     REMOVE HARDWARE WRIST Left 2016    Procedure: REMOVE HARDWARE WRIST;  Surgeon: Mayda Teresa MD;  Location: [facility]    SOFT TISSUE SURGERY  2019    SURGICAL HISTORY OF -       arthroscopyx 3    SURGICAL HISTORY OF -       lt cholesteotoma    ZZC NONSPECIFIC PROCEDURE      diskectomy L5s         SOCIAL HISTORY:  Social History     Socioeconomic History    Marital status:      Spouse name: julio    Number of children: 2    Years of education: 16    Highest education level: Not on file   Occupational History    Occupation: [employer]     Employer: [employer]   Tobacco Use    Smoking status: Former     Packs/day: 0.50     Years: 2.00     Additional pack years: 0.00     Total pack years: 1.00     Types: Cigarettes     Start date: 1968     Quit date: 1972     Years since quittin.1    Smokeless tobacco: Never   Substance and Sexual Activity    Alcohol use: Yes     Alcohol/week: 10.0 standard drinks of alcohol     Comment: 3times/week    Drug use: No    Sexual activity: Yes     Partners: Female   Other Topics Concern     Service No    Blood Transfusions No    Caffeine Concern No    Occupational Exposure No    Hobby Hazards No    Sleep Concern Yes    Stress Concern No    Weight Concern No    Special Diet No    Back Care Yes     Comment: surgery    L5  S2    Exercise Yes    Bike Helmet Yes    Seat Belt Yes    Self-Exams Yes    Parent/sibling w/ CABG, MI or angioplasty before 65F 55M? No   Social History Narrative    Not on file     Social Determinants of Health     Financial Resource Strain: Low Risk  (2023)    Financial  "Resource Strain     Within the past 12 months, have you or your family members you live with been unable to get utilities (heat, electricity) when it was really needed?: No   Food Insecurity: Low Risk  (9/20/2023)    Food Insecurity     Within the past 12 months, did you worry that your food would run out before you got money to buy more?: No     Within the past 12 months, did the food you bought just not last and you didn t have money to get more?: No   Transportation Needs: Low Risk  (9/20/2023)    Transportation Needs     Within the past 12 months, has lack of transportation kept you from medical appointments, getting your medicines, non-medical meetings or appointments, work, or from getting things that you need?: No   Physical Activity: Not on file   Stress: Not on file   Social Connections: Not on file   Interpersonal Safety: Low Risk  (10/6/2023)    Interpersonal Safety     Do you feel physically and emotionally safe where you currently live?: Yes     Within the past 12 months, have you been hit, slapped, kicked or otherwise physically hurt by someone?: No     Within the past 12 months, have you been humiliated or emotionally abused in other ways by your partner or ex-partner?: No   Housing Stability: Low Risk  (9/20/2023)    Housing Stability     Do you have housing? : Yes     Are you worried about losing your housing?: No         FAMILY HISTORY:  Family History   Problem Relation Age of Onset    Hyperlipidemia Father     C.A.D. Paternal Grandfather         MI         PHYSICAL EXAM:  Vital signs:  /78 (BP Location: Left arm)   Pulse 99   Resp 16   Ht 1.786 m (5' 10.32\")   Wt 98.3 kg (216 lb 11.2 oz)   SpO2 95%   BMI 30.82 kg/m       ECOG:   GENERAL/CONSTITUTIONAL: No acute distress.  EYES: Pupils are equal and round. Extraocular movements intact without nystagmus.  No scleral icterus.  RESPIRATORY: Equal chest rise.   MUSCULOSKELETAL: Warm and well-perfused, no cyanosis, clubbing, or edema. "   NEUROLOGIC: Cranial nerves are grossly intact. Alert, oriented to person, place and time, answers questions appropriately.  INTEGUMENTARY: No rashes or jaundice.  GAIT: Steady, does not use assistive device      LABS:   Latest Reference Range & Units 12/12/23 14:42 01/03/24 14:14 01/05/24 10:08 03/26/24 10:37   Sodium 135 - 145 mmol/L 136      Potassium 3.4 - 5.3 mmol/L 5.0      Chloride 98 - 107 mmol/L 100      Carbon Dioxide (CO2) 22 - 29 mmol/L 25      Urea Nitrogen 8.0 - 23.0 mg/dL 25.9 (H)      Creatinine 0.67 - 1.17 mg/dL 0.94      GFR Estimate >60 mL/min/1.73m2 86      Calcium 8.8 - 10.2 mg/dL 9.6      Anion Gap 7 - 15 mmol/L 11      Albumin 3.5 - 5.2 g/dL 4.6      Protein Total 6.4 - 8.3 g/dL 7.4      Alkaline Phosphatase 40 - 150 U/L 83      ALT 0 - 70 U/L 24      AST 0 - 45 U/L 23      Bilirubin Total <=1.2 mg/dL 0.3      Copper 70.0 - 140.0 ug/dL 133.4      Ferritin 31 - 409 ng/mL 170   101   RBC FOLATE  Rpt !      Glucose 70 - 99 mg/dL 92      Hemoglobin A 95.0 - 97.9 % 97.3      Hemoglobin A2 2.0 - 3.5 % 2.4      Hemoglobin C 0.0 - 0.0 % 0.0      Hemoglobin E 0.0 - 0.0 % 0.0      Hemoglobin F 0.0 - 2.1 % 0.3      Hemoglobin S 0.0 - 0.0 % 0.0      Hemoglobin Other 0.0 - 0.0 % 0.0      Hemoglobin Evaluation by CE  See Note      Sickle Cell Solubility Reflex  Not Performed      Hgb Capillary Electrophoresis Reflex  Not Performed      Iron 61 - 157 ug/dL 73   65   Iron Binding Capacity 240 - 430 ug/dL 340   272   Iron Sat Index 15 - 46 % 21   24   Lactate Dehydrogenase 0 - 250 U/L 170      Tissue Transglutaminase Antibody IgA <7.0 U/mL 0.8      Tissue Transglutaminase Josselyn IgG <7.0 U/mL 0.9      TSH 0.30 - 4.20 uIU/mL  1.06     Vitamin B12 232 - 1,245 pg/mL 623      Zinc 60.0 - 120.0 ug/dL 83.2      WBC 4.0 - 11.0 10e3/uL 8.0   6.1   Hemoglobin 13.3 - 17.7 g/dL 11.6 (L)   12.1 (L)   Hematocrit 40.0 - 53.0 % 35.1 (L)  35.1 (L)   36.9 (L)   Platelet Count 150 - 450 10e3/uL 327   286   RBC Count 4.40 - 5.90  10e6/uL 3.78 (L)   3.96 (L)   MCV 78 - 100 fL 93   93   MCH 26.5 - 33.0 pg 30.7   30.6   MCHC 31.5 - 36.5 g/dL 33.0   32.8   RDW 10.0 - 15.0 % 13.0   12.9   % Neutrophils % 70   61   % Lymphocytes % 22   28   % Monocytes % 7   8   % Eosinophils % 1   3   % Basophils % 1   1   Absolute Basophils 0.0 - 0.2 10e3/uL 0.1   0.0   Absolute Eosinophils 0.0 - 0.7 10e3/uL 0.1   0.2   Absolute Immature Granulocytes <=0.4 10e3/uL 0.0   0.0   Absolute Lymphocytes 0.8 - 5.3 10e3/uL 1.8   1.7   Absolute Monocytes 0.0 - 1.3 10e3/uL 0.5   0.5   % Immature Granulocytes % 0   0   Absolute Neutrophils 1.6 - 8.3 10e3/uL 5.6   3.7   % Retic 0.5 - 2.0 % 1.2   1.3   Absolute Retic 0.025 - 0.095 10e6/uL 0.044   0.052   Color Urine Colorless, Straw, Light Yellow, Yellow  Yellow      Appearance Urine Clear  Clear      Glucose Urine Negative mg/dL Negative      Bilirubin Urine Negative  Negative      Ketones Urine Negative mg/dL Negative      Specific Gravity Urine 1.003 - 1.035  1.020      pH Urine 5.0 - 7.0  5.5      Protein Albumin Urine Negative mg/dL Negative      Urobilinogen Urine 0.2, 1.0 E.U./dL 0.2      Nitrite Urine Negative  Negative      Blood Urine Negative  Negative      Leukocyte Esterase Urine Negative  Negative      WBC Urine 0-5 /HPF /HPF 0-5      RBC Urine 0-2 /HPF /HPF 0-2      Albumin Fraction 3.7 - 5.1 g/dL  4.4     Albumin Fraction Urine <=0.0 %   46.1 (H)    Alpha 1 Fraction 0.2 - 0.4 g/dL  0.3     Alpha 1 Fraction Urine <=0.0 %   13.4 (H)    Alpha 2 Fraction 0.5 - 0.9 g/dL  0.7     Alpha 2 Fraction Urine <=0.0 %   14.0 (H)    Beta Fraction 0.6 - 1.0 g/dL  0.7     Beta Fraction Urine <=0.0 %   15.9 (H)    ELP Interpretation:   Essentially normal electrophoretic pattern. No obvious monoclonal proteins seen. Pathologic significance requires clinical correlation. ABI Galeano M.D., Ph.D., Pathologist.     ELP Interpretation Urine    Only trace albumin and trace globulins. No obvious monoclonal protein seen and the  absence of a monoclonal immunoglobulin was confirmed by immunofixation of this same urine sample. Pathological significance requires clinical correlation.  ABI Galeano M.D., Ph.D., Pathologist    Gamma Fraction 0.7 - 1.6 g/dL  0.8     Gamma Fraction Urine <=0.0 %   10.6 (H)    Haptoglobin 30 - 200 mg/dL 239 (H)      IGA 84 - 499 mg/dL  190      - 1,616 mg/dL  942     IGM 35 - 242 mg/dL  53     Immunofix ELP Urine    No monoclonal protein seen on immunofixation. Pathologic significance requires clinical correlation. ABI Galeano M.D., Ph.D., Pathologist    Immunofixation ELP   No monoclonal protein seen on immunofixation. Pathologic significance requires clinical correlation. ABI Galeano M.D., Ph.D., Pathologist     Kappa Free Lt Chain 0.33 - 1.94 mg/dL  2.25 (H)     Kappa Lambda Ratio 0.26 - 1.65   1.32     Lambda Free Lt Chain 0.57 - 2.63 mg/dL  1.70     Monoclonal Peak <=0.0 g/dL  0.0     Monoclonal Peak Urine <=0.0 %   0.0    Total Protein Serum for ELP 6.4 - 8.3 g/dL  7.0     BLOOD MORPHOLOGY PATHOLOGIST REVIEW  Rpt      LAB BLOOD MORPHOLOGY PATHOLOGIST REVIEW  Rpt !      (H): Data is abnormally high  !: Data is abnormal  (L): Data is abnormally low  Rpt: View report in Results Review for more information    PATHOLOGY:      IMAGING:      ASSESSMENT/PLAN:  Dominguez Pittman is a 73 year old male with:    # Chronic normocytic anemia  - pt reports anemia since 1975  - 2008 hemoglobin 9.8-12.8, 4/2011 hemoglobin 13.1, 10/2019-9/2023 hemoglobin 11-12, 9/2023 hemoglobin 12.0, hematocrit 36.9, MCV 92  - 8/2017 colonoscopy: Diverticulosis in sigmoid colon, follow-up 10 years  -12/23 labs:  CBC: Hemoglobin 11.6, hematocrit 35.1, MCV 93  Ferritin 170, iron saturation index 21, TIBC 340, iron 73  B12 623, RBC folate 885, copper normal, zinc normal  CMP WNL, absolute retic 0.044, , haptoglobin 239  Peripheral smear: Mild normochromic, normocytic anemia without evidence of red cell regeneration    UA  negative for blood and RBC  celiac screen with tissue transglutaminase IgG and IgA negative  Hgb electrophoresis: Hemoglobin A 97.3, hemoglobin A2 2.4%, hemoglobin F 0.3%  -  labs:  TSH 1.06  1.  SIFE: no monoclonal protein  2.  SPEP: no monoclonal protein  3.  UIFE: no monoclonal protein  4.  UPEP: no monoclonal protein  5.  Kappa/lambda ratio: kappa 2.25, lambda 1.7, k/l ratio 1.32  6.  Quantitative immunoglobulins: Ig    IgA: 190    IgM: 53  - 3/24 labs:  Hgb 12.1, hematocrit 36.9, MCV 93  Ferritin 101, iron sat 24, TIBC 272, iron 73    PLAN:  - No reversible or sinister etiology found on the above workup  - oral iron started  iron sat 21%, improved to 24% , can continue for 3-6 additional months  - given pts hgb is 12 and has been since , can continue to observe  - discussed utility of bone marrow biopsy and we will hold off now   - can check CBC q6 months and if hgb drops significantly, will revisit further work up   - recommend alcohol cessation as this may be contributing to marrow suppression    #frequent alcohol use  - discussed alcohol cessation, pt agreeable    RTC 6 months for follow up with me, labs prior to visit    Jackelyn DO David  Hematology/Oncology  [facility]

## 2024-08-07 ENCOUNTER — APPOINTMENT (OUTPATIENT)
Dept: URBAN - METROPOLITAN AREA CLINIC 255 | Age: 74
Setting detail: DERMATOLOGY
End: 2024-08-07

## 2024-08-07 VITALS — WEIGHT: 212 LBS | HEIGHT: 71 IN

## 2024-08-07 DIAGNOSIS — G62.89 OTHER POLYNEUROPATHY: ICD-10-CM

## 2024-08-07 DIAGNOSIS — B07.8 OTHER VIRAL WARTS: ICD-10-CM

## 2024-08-07 DIAGNOSIS — L57.8 OTHER SKIN CHANGES DUE TO CHRONIC EXPOSURE TO NONIONIZING RADIATION: ICD-10-CM

## 2024-08-07 DIAGNOSIS — D22 MELANOCYTIC NEVI: ICD-10-CM

## 2024-08-07 DIAGNOSIS — Z71.89 OTHER SPECIFIED COUNSELING: ICD-10-CM

## 2024-08-07 DIAGNOSIS — D18.0 HEMANGIOMA: ICD-10-CM

## 2024-08-07 DIAGNOSIS — L82.1 OTHER SEBORRHEIC KERATOSIS: ICD-10-CM

## 2024-08-07 DIAGNOSIS — B35.1 TINEA UNGUIUM: ICD-10-CM

## 2024-08-07 PROBLEM — D22.72 MELANOCYTIC NEVI OF LEFT LOWER LIMB, INCLUDING HIP: Status: ACTIVE | Noted: 2024-08-07

## 2024-08-07 PROBLEM — D18.01 HEMANGIOMA OF SKIN AND SUBCUTANEOUS TISSUE: Status: ACTIVE | Noted: 2024-08-07

## 2024-08-07 PROBLEM — D22.61 MELANOCYTIC NEVI OF RIGHT UPPER LIMB, INCLUDING SHOULDER: Status: ACTIVE | Noted: 2024-08-07

## 2024-08-07 PROBLEM — D22.62 MELANOCYTIC NEVI OF LEFT UPPER LIMB, INCLUDING SHOULDER: Status: ACTIVE | Noted: 2024-08-07

## 2024-08-07 PROBLEM — D22.5 MELANOCYTIC NEVI OF TRUNK: Status: ACTIVE | Noted: 2024-08-07

## 2024-08-07 PROBLEM — D22.71 MELANOCYTIC NEVI OF RIGHT LOWER LIMB, INCLUDING HIP: Status: ACTIVE | Noted: 2024-08-07

## 2024-08-07 PROCEDURE — OTHER COUNSELING: OTHER

## 2024-08-07 PROCEDURE — OTHER PHOTO-DOCUMENTATION: OTHER

## 2024-08-07 PROCEDURE — OTHER PATIENT SPECIFIC COUNSELING: OTHER

## 2024-08-07 PROCEDURE — OTHER MIPS QUALITY: OTHER

## 2024-08-07 PROCEDURE — OTHER BENIGN DESTRUCTION: OTHER

## 2024-08-07 PROCEDURE — 99213 OFFICE O/P EST LOW 20 MIN: CPT | Mod: 25

## 2024-08-07 PROCEDURE — OTHER SUNSCREEN RECOMMENDATIONS: OTHER

## 2024-08-07 PROCEDURE — 17110 DESTRUCT B9 LESION 1-14: CPT

## 2024-08-07 ASSESSMENT — LOCATION SIMPLE DESCRIPTION DERM
LOCATION SIMPLE: RIGHT THIGH
LOCATION SIMPLE: RIGHT GREAT TOE
LOCATION SIMPLE: LEFT KNEE
LOCATION SIMPLE: LEFT FOREARM
LOCATION SIMPLE: LEFT LOWER BACK
LOCATION SIMPLE: LEFT UPPER ARM
LOCATION SIMPLE: LEFT GREAT TOE
LOCATION SIMPLE: LEFT CHEEK
LOCATION SIMPLE: LEFT THIGH
LOCATION SIMPLE: LEFT UPPER BACK
LOCATION SIMPLE: CHEST
LOCATION SIMPLE: RIGHT FOREARM
LOCATION SIMPLE: ABDOMEN
LOCATION SIMPLE: UPPER BACK

## 2024-08-07 ASSESSMENT — LOCATION ZONE DERM
LOCATION ZONE: FACE
LOCATION ZONE: LEG
LOCATION ZONE: TRUNK
LOCATION ZONE: TOENAIL
LOCATION ZONE: ARM

## 2024-08-07 ASSESSMENT — LOCATION DETAILED DESCRIPTION DERM
LOCATION DETAILED: MIDDLE STERNUM
LOCATION DETAILED: LEFT INFERIOR CENTRAL MALAR CHEEK
LOCATION DETAILED: LEFT VENTRAL PROXIMAL FOREARM
LOCATION DETAILED: LEFT ANTERIOR DISTAL THIGH
LOCATION DETAILED: INFERIOR THORACIC SPINE
LOCATION DETAILED: LEFT ANTERIOR PROXIMAL THIGH
LOCATION DETAILED: EPIGASTRIC SKIN
LOCATION DETAILED: RIGHT GREAT TOENAIL
LOCATION DETAILED: RIGHT ANTERIOR PROXIMAL THIGH
LOCATION DETAILED: LEFT DISTAL DORSAL FOREARM
LOCATION DETAILED: RIGHT ANTERIOR DISTAL THIGH
LOCATION DETAILED: LEFT GREAT TOENAIL
LOCATION DETAILED: LEFT INFERIOR LATERAL MIDBACK
LOCATION DETAILED: RIGHT PROXIMAL DORSAL FOREARM
LOCATION DETAILED: RIGHT VENTRAL PROXIMAL FOREARM
LOCATION DETAILED: LEFT VENTRAL DISTAL FOREARM
LOCATION DETAILED: LEFT KNEE
LOCATION DETAILED: LEFT MEDIAL UPPER BACK
LOCATION DETAILED: RIGHT VENTRAL DISTAL FOREARM
LOCATION DETAILED: LEFT ANTECUBITAL SKIN

## 2024-08-07 NOTE — PROCEDURE: MIPS QUALITY
Detail Level: Detailed
Quality 226: Preventive Care And Screening: Tobacco Use: Screening And Cessation Intervention: Patient screened for tobacco use, is a smoker AND received Cessation Counseling within measurement period or in the six months prior to the measurement period
Quality 47: Advance Care Plan: Advance Care Planning discussed and documented; advance care plan or surrogate decision maker documented in the medical record.
Quality 130: Documentation Of Current Medications In The Medical Record: Current Medications Documented
Quality 431: Preventive Care And Screening: Unhealthy Alcohol Use - Screening: Patient not identified as an unhealthy alcohol user when screened for unhealthy alcohol use using a systematic screening method

## 2024-08-07 NOTE — HPI: FULL BODY SKIN EXAMINATION
What Is The Reason For Today's Visit?: Full Body Skin Examination
What Is The Reason For Today's Visit? (Being Monitored For X): concerning skin lesions on an annual basis
Additional History: Left knee, think is wart. Assymtomic. Would like gone.\\n\\nRight great toe. Used shona. Uses caratol-thinks helped clear. Noticed improvement. Used 4 times at night for a couple of weeks. Had on both great toes.

## 2024-10-05 SDOH — HEALTH STABILITY: PHYSICAL HEALTH: ON AVERAGE, HOW MANY DAYS PER WEEK DO YOU ENGAGE IN MODERATE TO STRENUOUS EXERCISE (LIKE A BRISK WALK)?: 4 DAYS

## 2024-10-05 SDOH — HEALTH STABILITY: PHYSICAL HEALTH: ON AVERAGE, HOW MANY MINUTES DO YOU ENGAGE IN EXERCISE AT THIS LEVEL?: 120 MIN

## 2024-10-05 ASSESSMENT — SOCIAL DETERMINANTS OF HEALTH (SDOH): HOW OFTEN DO YOU GET TOGETHER WITH FRIENDS OR RELATIVES?: MORE THAN THREE TIMES A WEEK

## 2024-10-08 ENCOUNTER — OFFICE VISIT (OUTPATIENT)
Dept: FAMILY MEDICINE | Facility: CLINIC | Age: 74
End: 2024-10-08
Payer: COMMERCIAL

## 2024-10-08 VITALS
BODY MASS INDEX: 31.34 KG/M2 | DIASTOLIC BLOOD PRESSURE: 69 MMHG | RESPIRATION RATE: 16 BRPM | WEIGHT: 218.9 LBS | TEMPERATURE: 98.2 F | HEIGHT: 70 IN | HEART RATE: 75 BPM | OXYGEN SATURATION: 97 % | SYSTOLIC BLOOD PRESSURE: 116 MMHG

## 2024-10-08 DIAGNOSIS — Z00.00 ENCOUNTER FOR MEDICARE ANNUAL WELLNESS EXAM: Primary | ICD-10-CM

## 2024-10-08 DIAGNOSIS — I10 ESSENTIAL HYPERTENSION: ICD-10-CM

## 2024-10-08 DIAGNOSIS — E78.5 HYPERLIPIDEMIA LDL GOAL <130: ICD-10-CM

## 2024-10-08 DIAGNOSIS — Z12.5 SCREENING FOR PROSTATE CANCER: ICD-10-CM

## 2024-10-08 LAB
ALBUMIN SERPL BCG-MCNC: 4.7 G/DL (ref 3.5–5.2)
ALP SERPL-CCNC: 89 U/L (ref 40–150)
ALT SERPL W P-5'-P-CCNC: 23 U/L (ref 0–70)
ANION GAP SERPL CALCULATED.3IONS-SCNC: 15 MMOL/L (ref 7–15)
AST SERPL W P-5'-P-CCNC: 26 U/L (ref 0–45)
BILIRUB SERPL-MCNC: 0.4 MG/DL
BUN SERPL-MCNC: 23.8 MG/DL (ref 8–23)
CALCIUM SERPL-MCNC: 9.4 MG/DL (ref 8.8–10.4)
CHLORIDE SERPL-SCNC: 101 MMOL/L (ref 98–107)
CHOLEST SERPL-MCNC: 189 MG/DL
CREAT SERPL-MCNC: 0.92 MG/DL (ref 0.67–1.17)
EGFRCR SERPLBLD CKD-EPI 2021: 88 ML/MIN/1.73M2
ERYTHROCYTE [DISTWIDTH] IN BLOOD BY AUTOMATED COUNT: 13.3 % (ref 10–15)
FASTING STATUS PATIENT QL REPORTED: YES
FASTING STATUS PATIENT QL REPORTED: YES
GLUCOSE SERPL-MCNC: 103 MG/DL (ref 70–99)
HCO3 SERPL-SCNC: 21 MMOL/L (ref 22–29)
HCT VFR BLD AUTO: 38.1 % (ref 40–53)
HDLC SERPL-MCNC: 54 MG/DL
HGB BLD-MCNC: 12.6 G/DL (ref 13.3–17.7)
LDLC SERPL CALC-MCNC: 123 MG/DL
MCH RBC QN AUTO: 31 PG (ref 26.5–33)
MCHC RBC AUTO-ENTMCNC: 33.1 G/DL (ref 31.5–36.5)
MCV RBC AUTO: 94 FL (ref 78–100)
NONHDLC SERPL-MCNC: 135 MG/DL
PLATELET # BLD AUTO: 280 10E3/UL (ref 150–450)
POTASSIUM SERPL-SCNC: 4.9 MMOL/L (ref 3.4–5.3)
PROT SERPL-MCNC: 7.8 G/DL (ref 6.4–8.3)
PSA SERPL DL<=0.01 NG/ML-MCNC: 0.55 NG/ML (ref 0–6.5)
RBC # BLD AUTO: 4.06 10E6/UL (ref 4.4–5.9)
SODIUM SERPL-SCNC: 137 MMOL/L (ref 135–145)
TRIGL SERPL-MCNC: 59 MG/DL
WBC # BLD AUTO: 6.6 10E3/UL (ref 4–11)

## 2024-10-08 PROCEDURE — 90662 IIV NO PRSV INCREASED AG IM: CPT | Performed by: FAMILY MEDICINE

## 2024-10-08 PROCEDURE — G0103 PSA SCREENING: HCPCS | Performed by: FAMILY MEDICINE

## 2024-10-08 PROCEDURE — 36415 COLL VENOUS BLD VENIPUNCTURE: CPT | Performed by: FAMILY MEDICINE

## 2024-10-08 PROCEDURE — 99214 OFFICE O/P EST MOD 30 MIN: CPT | Mod: 25 | Performed by: FAMILY MEDICINE

## 2024-10-08 PROCEDURE — 90480 ADMN SARSCOV2 VAC 1/ONLY CMP: CPT | Performed by: FAMILY MEDICINE

## 2024-10-08 PROCEDURE — 80061 LIPID PANEL: CPT | Performed by: FAMILY MEDICINE

## 2024-10-08 PROCEDURE — 91320 SARSCV2 VAC 30MCG TRS-SUC IM: CPT | Performed by: FAMILY MEDICINE

## 2024-10-08 PROCEDURE — 85027 COMPLETE CBC AUTOMATED: CPT | Performed by: FAMILY MEDICINE

## 2024-10-08 PROCEDURE — 80053 COMPREHEN METABOLIC PANEL: CPT | Performed by: FAMILY MEDICINE

## 2024-10-08 PROCEDURE — G0008 ADMIN INFLUENZA VIRUS VAC: HCPCS | Performed by: FAMILY MEDICINE

## 2024-10-08 PROCEDURE — G0439 PPPS, SUBSEQ VISIT: HCPCS | Performed by: FAMILY MEDICINE

## 2024-10-08 RX ORDER — ATORVASTATIN CALCIUM 20 MG/1
20 TABLET, FILM COATED ORAL DAILY
Qty: 90 TABLET | Refills: 3 | Status: SHIPPED | OUTPATIENT
Start: 2024-10-08

## 2024-10-08 RX ORDER — FLUTICASONE PROPIONATE 50 MCG
SPRAY, SUSPENSION (ML) NASAL
COMMUNITY
Start: 2024-06-25

## 2024-10-08 RX ORDER — CLINDAMYCIN HYDROCHLORIDE 300 MG/1
CAPSULE ORAL
COMMUNITY
Start: 2024-08-29

## 2024-10-08 RX ORDER — LISINOPRIL AND HYDROCHLOROTHIAZIDE 10; 12.5 MG/1; MG/1
1 TABLET ORAL DAILY
Qty: 90 TABLET | Refills: 3 | Status: SHIPPED | OUTPATIENT
Start: 2024-10-08

## 2024-10-08 ASSESSMENT — PAIN SCALES - GENERAL: PAINLEVEL: NO PAIN (0)

## 2024-10-08 NOTE — PROGRESS NOTES
"Preventive Care Visit  [facility] ZE Dietz MD, Family Medicine  Oct 8, 2024      Assessment & Plan     Hyperlipidemia LDL goal <130    - Lipid panel reflex to direct LDL Non-fasting; Future  - atorvastatin (LIPITOR) 20 MG tablet; Take 1 tablet (20 mg) by mouth daily.    Encounter for Medicare annual wellness exam    - Lipid panel reflex to direct LDL Non-fasting; Future  - CBC with platelets; Future  - Comprehensive metabolic panel (BMP + Alb, Alk Phos, ALT, AST, Total. Bili, TP); Future  - PSA, screen; Future    Screening for prostate cancer    - PSA, screen; Future    Essential hypertension    - lisinopril-hydrochlorothiazide (ZESTORETIC) 10-12.5 MG tablet; Take 1 tablet by mouth daily.    Patient has been advised of split billing requirements and indicates understanding: Yes        BMI  Estimated body mass index is 31.41 kg/m  as calculated from the following:    Height as of this encounter: 1.778 m (5' 10\").    Weight as of this encounter: 99.3 kg (218 lb 14.4 oz).   Weight management plan: Discussed healthy diet and exercise guidelines    Counseling  Appropriate preventive services were addressed with this patient via screening, questionnaire, or discussion as appropriate for fall prevention, nutrition, physical activity, Tobacco-use cessation, social engagement, weight loss and cognition.  Checklist reviewing preventive services available has been given to the patient.  Reviewed patient's diet, addressing concerns and/or questions.   He is at risk for psychosocial distress and has been provided with information to reduce risk.   The patient was provided with written information regarding signs of hearing loss.       FUTURE APPOINTMENTS:       - Follow-up visit in 1 year     Katia GOFF is a 74 year old, presenting for the following:  Wellness Visit        10/8/2024     9:11 AM   Additional Questions   Roomed by Chloe HOPKINS         [facility] Directive  Patient has a " Health Care Directive on file  Advance care planning document is on file and is current.    HPI    Pt states just here for AWV and labs, no concerns.         10/5/2024   General Health   How would you rate your overall physical health? Good   Feel stress (tense, anxious, or unable to sleep) Only a little      (!) STRESS CONCERN      10/5/2024   Nutrition   Diet: Regular (no restrictions)            10/5/2024   Exercise   Days per week of moderate/strenous exercise 4 days   Average minutes spent exercising at this level 120 min            10/5/2024   Social Factors   Frequency of gathering with friends or relatives More than three times a week   Worry food won't last until get money to buy more No   Food not last or not have enough money for food? No   Do you have housing? (Housing is defined as stable permanent housing and does not include staying ouside in a car, in a tent, in an abandoned building, in an overnight shelter, or couch-surfing.) Yes   Are you worried about losing your housing? No   Lack of transportation? No   Unable to get utilities (heat,electricity)? No            10/5/2024   Fall Risk   Fallen 2 or more times in the past year? No   Trouble with walking or balance? No             10/5/2024   Activities of Daily Living- Home Safety   Needs help with the following daily activites None of the above   Safety concerns in the home None of the above            10/5/2024   Dental   Dentist two times every year? Yes            10/5/2024   Hearing Screening   Hearing concerns? (!) I NEED TO ASK PEOPLE TO SPEAK UP OR REPEAT THEMSELVES.            10/5/2024   Driving Risk Screening   Patient/family members have concerns about driving No            10/5/2024   General Alertness/Fatigue Screening   Have you been more tired than usual lately? No            10/5/2024   Urinary Incontinence Screening   Bothered by leaking urine in past 6 months No            10/5/2024   TB Screening   Were you born outside of the  [place]? No            Today's PHQ-2 Score:       10/8/2024     9:01 AM   PHQ-2 (  [employer])   Q1: Little interest or pleasure in doing things 0   Q2: Feeling down, depressed or hopeless 0   PHQ-2 Score 0   Q1: Little interest or pleasure in doing things Not at all   Q2: Feeling down, depressed or hopeless Not at all   PHQ-2 Score 0           10/5/2024   Substance Use   Alcohol more than 3/day or more than 7/wk No   Do you have a current opioid prescription? No   How severe/bad is pain from 1 to 10? 1/10   Do you use any other substances recreationally? No        Social History     Tobacco Use    Smoking status: Former     Current packs/day: 0.00     Average packs/day: 0.5 packs/day for 4.1 years (2.0 ttl pk-yrs)     Types: Cigarettes     Start date: 1968     Quit date: 1972     Years since quittin.7    Smokeless tobacco: Never   Substance Use Topics    Alcohol use: Yes     Alcohol/week: 10.0 standard drinks of alcohol     Comment: 3times/week    Drug use: No       ASCVD Risk   The 10-year ASCVD risk score (Alex CARREON, et al., 2019) is: 26.5%    Values used to calculate the score:      Age: 74 years      Sex: Male      Is Non- : No      Diabetic: No      Tobacco smoker: No      Systolic Blood Pressure: 138 mmHg      Is BP treated: Yes      HDL Cholesterol: 49 mg/dL      Total Cholesterol: 164 mg/dL            Reviewed and updated as needed this visit by Provider                    Past Medical History:   Diagnosis Date    Arthritis     High cholesterol     Hx musculoskletl dis NEC     Hypertension     Other nonspecific findings on examination of blood(790.99)     PAC (premature atrial contraction)     Personal history of other disorders of nervous system and sense organs     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      Past Surgical History:   Procedure Laterality Date    ARTHRODESIS WRIST Left 2016    Procedure: ARTHRODESIS WRIST;   Surgeon: Mayda Teresa MD;  Location: [facility]    ARTHRODESIS WRIST Left 12/21/2016    Procedure: ARTHRODESIS WRIST;  Surgeon: Mayda Teresa MD;  Location: [facility]    COLONOSCOPY  07/27/2012    Procedure: COLONOSCOPY;  COLONOSCOPY;  Surgeon: Kris Garcia MD;  [facility]:  [facility]    COLONOSCOPY N/A 08/01/2017    Procedure: COLONOSCOPY;  COLONOSCOPY;  Surgeon: Emil Plaza MD;  Location:  GI    ENDOSCOPIC RELEASE CARPAL TUNNEL Left 03/18/2016    Procedure: ENDOSCOPIC RELEASE CARPAL TUNNEL;  Surgeon: Mayda Teresa MD;  Location: [facility]    EYE SURGERY  2016 2020    JOINT REPLACEMTN, KNEE RT/LT  2007    Joint Replacement knee LT, right hip replacment     REMOVE HARDWARE WRIST Left 12/21/2016    Procedure: REMOVE HARDWARE WRIST;  Surgeon: Mayda Teresa MD;  Location: [facility]    SOFT TISSUE SURGERY  2019 2020    SURGICAL HISTORY OF -       arthroscopyx 3    SURGICAL HISTORY OF -       lt cholesteotoma    ZZC NONSPECIFIC PROCEDURE      diskectomy L5s     Labs reviewed in EPIC  BP Readings from Last 3 Encounters:   10/08/24 116/69   03/28/24 138/78   10/06/23 128/70    Wt Readings from Last 3 Encounters:   10/08/24 99.3 kg (218 lb 14.4 oz)   03/28/24 98.3 kg (216 lb 11.2 oz)   12/06/23 94.3 kg (208 lb)                  Patient Active Problem List   Diagnosis    Arthritis    Varicose veins of legs    HYPERLIPIDEMIA LDL GOAL <130    Advance care planning    Erectile dysfunction    Ganglion cyst    Throat clearing    Laceration    Head injury    Open wound of scalp    Left inguinal hernia    Plantar warts    PAC (premature atrial contraction)    Abnormality of abdominal aorta     Past Surgical History:   Procedure Laterality Date    ARTHRODESIS WRIST Left 03/18/2016    Procedure: ARTHRODESIS WRIST;  Surgeon: Mayda Teresa MD;  Location: [facility]    ARTHRODESIS WRIST Left 12/21/2016    Procedure: ARTHRODESIS WRIST;  Surgeon: Mayda Teresa  MD Angela;  Location: [facility]    COLONOSCOPY  2012    Procedure: COLONOSCOPY;  COLONOSCOPY;  Surgeon: Kris Garcia MD;  Location:  GI    COLONOSCOPY N/A 2017    Procedure: COLONOSCOPY;  COLONOSCOPY;  Surgeon: Emil Plaza MD;  Location:  GI    ENDOSCOPIC RELEASE CARPAL TUNNEL Left 2016    Procedure: ENDOSCOPIC RELEASE CARPAL TUNNEL;  Surgeon: Mayda Teresa MD;  Location: [facility]    EYE SURGERY  2016    JOINT REPLACEMTN, KNEE RT/LT  2007    Joint Replacement knee LT, right hip replacment     REMOVE HARDWARE WRIST Left 2016    Procedure: REMOVE HARDWARE WRIST;  Surgeon: Mayda Teresa MD;  Location: [facility]    SOFT TISSUE SURGERY  2019    SURGICAL HISTORY OF -       arthroscopyx 3    SURGICAL HISTORY OF -       lt cholesteotoma    ZZC NONSPECIFIC PROCEDURE      diskectomy L5s       Social History     Tobacco Use    Smoking status: Former     Current packs/day: 0.00     Average packs/day: 0.5 packs/day for 4.1 years (2.0 ttl pk-yrs)     Types: Cigarettes     Start date: 1968     Quit date: 1972     Years since quittin.7    Smokeless tobacco: Never   Substance Use Topics    Alcohol use: Yes     Alcohol/week: 10.0 standard drinks of alcohol     Comment: 3times/week     Family History   Problem Relation Age of Onset    Hyperlipidemia Father     C.A.D. Paternal Grandfather         MI         Current Outpatient Medications   Medication Sig Dispense Refill    amitriptyline (ELAVIL) 25 MG tablet TAKE 1 TO 2 TABLETS BY MOUTH AT BEDTIME 180 tablet 1    ASPIRIN PO Take 81 mg by mouth daily      atorvastatin (LIPITOR) 20 MG tablet Take 1 tablet (20 mg) by mouth daily. 90 tablet 3    Ferrous Sulfate (IRON PO) Take 65 mg by mouth at bedtime      fish oil-omega-3 fatty acids 1000 MG capsule Take 2 capsules by mouth daily. 180 capsule 12    fluticasone (FLONASE) 50 MCG/ACT nasal spray 1 SPRAY BY EACH NOSTRIL ROUTE DAILY AS NEEDED FOR RHINITIS  OR ALLERGIES FOR UP TO 10 DAYS.      lisinopril-hydrochlorothiazide (ZESTORETIC) 10-12.5 MG tablet Take 1 tablet by mouth daily. 90 tablet 3    MULTI-VITAMIN OR TABS 1 q day   0    VITAMIN D, CHOLECALCIFEROL, PO Take by mouth daily      clindamycin (CLEOCIN) 300 MG capsule take 1 capsule by mouth 3 times a day for 7 days (Patient not taking: Reported on 10/8/2024)       Allergies   Allergen Reactions    Penicillins Hives and Rash     Recent Labs   Lab Test 01/03/24  1414 12/12/23  1442 09/20/23  1613 10/04/22  0933 09/03/21  1108 09/03/21  1108 03/11/21  1116 12/07/20  1102 08/24/20  1005   LDL  --   --   --  105*  --  125*  --   --  124*   HDL  --   --   --  49  --  52  --   --  56   TRIG  --   --   --  52  --  49  --   --  113   ALT  --  24  --  23  --  27  --   --   --    CR  --  0.94 0.89 0.75   < > 1.01 0.81 0.84 0.85   GFRESTIMATED  --  86 >90 >90   < > 75 >90 89 89   GFRESTBLACK  --   --   --   --   --   --  >90 >90 >90   POTASSIUM  --  5.0 4.8 5.1   < > 4.8 4.6 4.8 4.7   TSH 1.06  --   --   --   --   --   --   --   --     < > = values in this interval not displayed.      Current providers sharing in care for this patient include:  Patient Care Team:  Brent Dietz MD as PCP - General (Family Practice)  Brent Dietz MD as Assigned PCP  Erasmo Barriga MD as MD (Hematology & Oncology)  Cesar De La Rosa MD as MD (Hematology & Oncology)  Jackelyn Hernandez DO as Assigned Cancer Care Provider    The following health maintenance items are reviewed in Epic and correct as of today:  Health Maintenance   Topic Date Due    LIPID  10/04/2023    DTAP/TDAP/TD IMMUNIZATION (2 - Td or Tdap) 05/01/2024    INFLUENZA VACCINE (1) 09/01/2024    COVID-19 Vaccine (5 - 2024-25 season) 09/01/2024    ANNUAL REVIEW OF HM ORDERS  10/06/2024    MEDICARE ANNUAL WELLNESS VISIT  10/06/2024    BMP  12/12/2024    FALL RISK ASSESSMENT  10/08/2025    GLUCOSE  12/12/2026    COLORECTAL CANCER SCREENING  08/01/2027    ADVANCE CARE  "PLANNING  10/06/2028    PHQ-2 (once per calendar year)  Completed    Pneumococcal Vaccine: 65+ Years  Completed    ZOSTER IMMUNIZATION  Completed    RSV VACCINE  Completed    AORTIC ANEURYSM SCREENING (SYSTEM ASSIGNED)  Completed    HPV IMMUNIZATION  Aged Out    MENINGITIS IMMUNIZATION  Aged Out    RSV MONOCLONAL ANTIBODY  Aged Out    HEPATITIS C SCREENING  Discontinued         Review of Systems  Constitutional, HEENT, cardiovascular, pulmonary, GI, , musculoskeletal, neuro, skin, endocrine and psych systems are negative, except as otherwise noted.     Objective    Exam  /69 (BP Location: Left arm, Patient Position: Sitting, Cuff Size: Adult Large)   Pulse 75   Temp 98.2  F (36.8  C) (Tympanic)   Resp 16   Ht 1.778 m (5' 10\")   Wt 99.3 kg (218 lb 14.4 oz)   SpO2 97%   BMI 31.41 kg/m     Estimated body mass index is 31.41 kg/m  as calculated from the following:    Height as of this encounter: 1.778 m (5' 10\").    Weight as of this encounter: 99.3 kg (218 lb 14.4 oz).    Physical Exam  GENERAL: alert and no distress  EYES: Eyes grossly normal to inspection, PERRL and conjunctivae and sclerae normal  HENT: ear canals and TM's normal, nose and mouth without ulcers or lesions  NECK: no adenopathy, no asymmetry, masses, or scars  RESP: lungs clear to auscultation - no rales, rhonchi or wheezes  CV: regular rate and rhythm, normal S1 S2, no S3 or S4, no murmur, click or rub, no peripheral edema  ABDOMEN: soft, nontender, no hepatosplenomegaly, no masses and bowel sounds normal  MS: no gross musculoskeletal defects noted, no edema  NEURO: Normal strength and tone, mentation intact and speech normal  BACK: no CVA tenderness, no paralumbar tenderness  PSYCH: mentation appears normal, affect normal/bright        10/8/2024   Mini Cog   Clock Draw Score 2 Normal   3 Item Recall 3 objects recalled   Mini Cog Total Score 5                 Signed Electronically by: Brent Dietz MD    "

## 2024-10-08 NOTE — PATIENT INSTRUCTIONS
Patient Education   Preventive Care Advice   This is general advice given by our system to help you stay healthy. However, your care team may have specific advice just for you. Please talk to your care team about your preventive care needs.  Nutrition  Eat 5 or more servings of fruits and vegetables each day.  Try wheat bread, brown rice and whole grain pasta (instead of white bread, rice, and pasta).  Get enough calcium and vitamin D. Check the label on foods and aim for 100% of the RDA (recommended daily allowance).  Lifestyle  Exercise at least 150 minutes each week  (30 minutes a day, 5 days a week).  Do muscle strengthening activities 2 days a week. These help control your weight and prevent disease.  No smoking.  Wear sunscreen to prevent skin cancer.  Have a dental exam and cleaning every 6 months.  Yearly exams  See your health care team every year to talk about:  Any changes in your health.  Any medicines your care team has prescribed.  Preventive care, family planning, and ways to prevent chronic diseases.  Shots (vaccines)   HPV shots (up to age 26), if you've never had them before.  Hepatitis B shots (up to age 59), if you've never had them before.  COVID-19 shot: Get this shot when it's due.  Flu shot: Get a flu shot every year.  Tetanus shot: Get a tetanus shot every 10 years.  Pneumococcal, hepatitis A, and RSV shots: Ask your care team if you need these based on your risk.  Shingles shot (for age 50 and up)  General health tests  Diabetes screening:  Starting at age 35, Get screened for diabetes at least every 3 years.  If you are younger than age 35, ask your care team if you should be screened for diabetes.  Cholesterol test: At age 39, start having a cholesterol test every 5 years, or more often if advised.  Bone density scan (DEXA): At age 50, ask your care team if you should have this scan for osteoporosis (brittle bones).  Hepatitis C: Get tested at least once in your life.  STIs (sexually  transmitted infections)  Before age 24: Ask your care team if you should be screened for STIs.  After age 24: Get screened for STIs if you're at risk. You are at risk for STIs (including HIV) if:  You are sexually active with more than one person.  You don't use condoms every time.  You or a partner was diagnosed with a sexually transmitted infection.  If you are at risk for HIV, ask about PrEP medicine to prevent HIV.  Get tested for HIV at least once in your life, whether you are at risk for HIV or not.  Cancer screening tests  Cervical cancer screening: If you have a cervix, begin getting regular cervical cancer screening tests starting at age 21.  Breast cancer scan (mammogram): If you've ever had breasts, begin having regular mammograms starting at age 40. This is a scan to check for breast cancer.  Colon cancer screening: It is important to start screening for colon cancer at age 45.  Have a colonoscopy test every 10 years (or more often if you're at risk) Or, ask your provider about stool tests like a FIT test every year or Cologuard test every 3 years.  To learn more about your testing options, visit:   .  For help making a decision, visit:   https://bit.ly/zj51453.  Prostate cancer screening test: If you have a prostate, ask your care team if a prostate cancer screening test (PSA) at age 55 is right for you.  Lung cancer screening: If you are a current or former smoker ages 50 to 80, ask your care team if ongoing lung cancer screenings are right for you.  For informational purposes only. Not to replace the advice of your health care provider. Copyright   2023 [facility] [employer]. All rights reserved. Clinically reviewed by the [facility] Transitions Program. [employer] 191526 - REV 01/24.  Hearing Loss: Care Instructions  Overview     Hearing loss is a sudden or slow decrease in how well you hear. It can range from slight to profound. Permanent hearing loss can occur with aging. It also can  happen when you are exposed long-term to loud noise. Examples include listening to loud music, riding motorcycles, or being around other loud machines.  Hearing loss can affect your work and home life. It can make you feel lonely or depressed. You may feel that you have lost your independence. But hearing aids and other devices can help you hear better and feel connected to others.  Follow-up care is a key part of your treatment and safety. Be sure to make and go to all appointments, and call your doctor if you are having problems. It's also a good idea to know your test results and keep a list of the medicines you take.  How can you care for yourself at home?  Avoid loud noises whenever possible. This helps keep your hearing from getting worse.  Always wear hearing protection around loud noises.  Wear a hearing aid as directed.  A professional can help you pick a hearing aid that will work best for you.  You can also get hearing aids over the counter for mild to moderate hearing loss.  Have hearing tests as your doctor suggests. They can show whether your hearing has changed. Your hearing aid may need to be adjusted.  Use other devices as needed. These may include:  Telephone amplifiers and hearing aids that can connect to a television, stereo, radio, or microphone.  Devices that use lights or vibrations. These alert you to the doorbell, a ringing telephone, or a baby monitor.  Television closed-captioning. This shows the words at the bottom of the screen. Most new TVs can do this.  TTY (text telephone). This lets you type messages back and forth on the telephone instead of talking or listening. These devices are also called TDD. When messages are typed on the keyboard, they are sent over the phone line to a receiving TTY. The message is shown on a monitor.  Use text messaging, social media, and email if it is hard for you to communicate by telephone.  Try to learn a listening technique called speechreading. It is  "not lipreading. You pay attention to people's gestures, expressions, posture, and tone of voice. These clues can help you understand what a person is saying. Face the person you are talking to, and have them face you. Make sure the lighting is good. You need to see the other person's face clearly.  Think about counseling if you need help to adjust to your hearing loss.  When should you call for help?  Watch closely for changes in your health, and be sure to contact your doctor if:    You think your hearing is getting worse.     You have new symptoms, such as dizziness or nausea.   Where can you learn more?  Go to https://www.[employer].net/patiented  Enter R798 in the search box to learn more about \"Hearing Loss: Care Instructions.\"  Current as of: September 27, 2023  Content Version: 14.2 2024 [facility] [employer].   Care instructions adapted under license by your healthcare professional. If you have questions about a medical condition or this instruction, always ask your healthcare professional. [employer] disclaims any warranty or liability for your use of this information.       "

## 2025-02-01 DIAGNOSIS — G62.89 OTHER POLYNEUROPATHY: ICD-10-CM

## 2025-02-03 NOTE — TELEPHONE ENCOUNTER
Prescription approved per [facility] Refill Protocol.  Federica Cordero RN  [facility]

## 2025-06-23 ENCOUNTER — APPOINTMENT (OUTPATIENT)
Dept: URBAN - METROPOLITAN AREA CLINIC 255 | Age: 75
Setting detail: DERMATOLOGY
End: 2025-06-23

## 2025-06-23 VITALS — WEIGHT: 209 LBS | HEIGHT: 72 IN

## 2025-06-23 DIAGNOSIS — D18.0 HEMANGIOMA: ICD-10-CM

## 2025-06-23 DIAGNOSIS — L57.8 OTHER SKIN CHANGES DUE TO CHRONIC EXPOSURE TO NONIONIZING RADIATION: ICD-10-CM

## 2025-06-23 DIAGNOSIS — D22 MELANOCYTIC NEVI: ICD-10-CM

## 2025-06-23 DIAGNOSIS — L82.1 OTHER SEBORRHEIC KERATOSIS: ICD-10-CM

## 2025-06-23 DIAGNOSIS — Z71.89 OTHER SPECIFIED COUNSELING: ICD-10-CM

## 2025-06-23 PROBLEM — D22.71 MELANOCYTIC NEVI OF RIGHT LOWER LIMB, INCLUDING HIP: Status: ACTIVE | Noted: 2025-06-23

## 2025-06-23 PROBLEM — D22.5 MELANOCYTIC NEVI OF TRUNK: Status: ACTIVE | Noted: 2025-06-23

## 2025-06-23 PROBLEM — D22.72 MELANOCYTIC NEVI OF LEFT LOWER LIMB, INCLUDING HIP: Status: ACTIVE | Noted: 2025-06-23

## 2025-06-23 PROBLEM — D22.61 MELANOCYTIC NEVI OF RIGHT UPPER LIMB, INCLUDING SHOULDER: Status: ACTIVE | Noted: 2025-06-23

## 2025-06-23 PROBLEM — D22.62 MELANOCYTIC NEVI OF LEFT UPPER LIMB, INCLUDING SHOULDER: Status: ACTIVE | Noted: 2025-06-23

## 2025-06-23 PROBLEM — D18.01 HEMANGIOMA OF SKIN AND SUBCUTANEOUS TISSUE: Status: ACTIVE | Noted: 2025-06-23

## 2025-06-23 PROCEDURE — OTHER MIPS QUALITY: OTHER

## 2025-06-23 PROCEDURE — OTHER SUNSCREEN RECOMMENDATIONS: OTHER

## 2025-06-23 PROCEDURE — 99213 OFFICE O/P EST LOW 20 MIN: CPT

## 2025-06-23 PROCEDURE — OTHER COUNSELING: OTHER

## 2025-06-23 PROCEDURE — OTHER PATIENT SPECIFIC COUNSELING: OTHER

## 2025-06-23 ASSESSMENT — LOCATION DETAILED DESCRIPTION DERM
LOCATION DETAILED: INFERIOR THORACIC SPINE
LOCATION DETAILED: RIGHT PROXIMAL DORSAL FOREARM
LOCATION DETAILED: RIGHT ANTERIOR DISTAL THIGH
LOCATION DETAILED: LEFT INFERIOR CENTRAL MALAR CHEEK
LOCATION DETAILED: LEFT INFERIOR LATERAL MIDBACK
LOCATION DETAILED: EPIGASTRIC SKIN
LOCATION DETAILED: MIDDLE STERNUM
LOCATION DETAILED: LEFT ANTERIOR PROXIMAL THIGH
LOCATION DETAILED: RIGHT ANTERIOR PROXIMAL THIGH
LOCATION DETAILED: RIGHT VENTRAL PROXIMAL FOREARM
LOCATION DETAILED: LEFT ANTERIOR DISTAL THIGH
LOCATION DETAILED: LEFT VENTRAL DISTAL FOREARM
LOCATION DETAILED: RIGHT VENTRAL DISTAL FOREARM
LOCATION DETAILED: LEFT MEDIAL UPPER BACK
LOCATION DETAILED: LEFT VENTRAL PROXIMAL FOREARM
LOCATION DETAILED: LEFT ANTECUBITAL SKIN
LOCATION DETAILED: LEFT DISTAL DORSAL FOREARM

## 2025-06-23 ASSESSMENT — LOCATION SIMPLE DESCRIPTION DERM
LOCATION SIMPLE: UPPER BACK
LOCATION SIMPLE: ABDOMEN
LOCATION SIMPLE: LEFT THIGH
LOCATION SIMPLE: LEFT CHEEK
LOCATION SIMPLE: RIGHT THIGH
LOCATION SIMPLE: LEFT UPPER BACK
LOCATION SIMPLE: LEFT FOREARM
LOCATION SIMPLE: LEFT UPPER ARM
LOCATION SIMPLE: LEFT LOWER BACK
LOCATION SIMPLE: RIGHT FOREARM
LOCATION SIMPLE: CHEST

## 2025-06-23 ASSESSMENT — LOCATION ZONE DERM
LOCATION ZONE: ARM
LOCATION ZONE: FACE
LOCATION ZONE: LEG
LOCATION ZONE: TRUNK

## 2025-08-07 SDOH — HEALTH STABILITY: PHYSICAL HEALTH: ON AVERAGE, HOW MANY DAYS PER WEEK DO YOU ENGAGE IN MODERATE TO STRENUOUS EXERCISE (LIKE A BRISK WALK)?: 3 DAYS

## 2025-08-07 SDOH — HEALTH STABILITY: PHYSICAL HEALTH: ON AVERAGE, HOW MANY MINUTES DO YOU ENGAGE IN EXERCISE AT THIS LEVEL?: 150+ MIN

## 2025-08-07 ASSESSMENT — SOCIAL DETERMINANTS OF HEALTH (SDOH): HOW OFTEN DO YOU GET TOGETHER WITH FRIENDS OR RELATIVES?: TWICE A WEEK

## 2025-08-11 ENCOUNTER — OFFICE VISIT (OUTPATIENT)
Dept: FAMILY MEDICINE | Facility: CLINIC | Age: 75
End: 2025-08-11
Payer: COMMERCIAL

## 2025-08-11 VITALS
HEART RATE: 78 BPM | BODY MASS INDEX: 29.92 KG/M2 | TEMPERATURE: 98.2 F | SYSTOLIC BLOOD PRESSURE: 136 MMHG | RESPIRATION RATE: 20 BRPM | DIASTOLIC BLOOD PRESSURE: 82 MMHG | HEIGHT: 70 IN | WEIGHT: 209 LBS | OXYGEN SATURATION: 98 %

## 2025-08-11 DIAGNOSIS — Z00.01 ENCOUNTER FOR GENERAL ADULT MEDICAL EXAMINATION WITH ABNORMAL FINDINGS: Primary | ICD-10-CM

## 2025-08-11 DIAGNOSIS — Z12.5 SCREENING FOR PROSTATE CANCER: ICD-10-CM

## 2025-08-11 LAB
ALBUMIN SERPL BCG-MCNC: 4.7 G/DL (ref 3.5–5.2)
ALP SERPL-CCNC: 70 U/L (ref 40–150)
ALT SERPL W P-5'-P-CCNC: 20 U/L (ref 0–70)
ANION GAP SERPL CALCULATED.3IONS-SCNC: 13 MMOL/L (ref 7–15)
AST SERPL W P-5'-P-CCNC: 30 U/L (ref 0–45)
BILIRUB SERPL-MCNC: 0.4 MG/DL
BUN SERPL-MCNC: 13.8 MG/DL (ref 8–23)
CALCIUM SERPL-MCNC: 9.7 MG/DL (ref 8.8–10.4)
CHLORIDE SERPL-SCNC: 102 MMOL/L (ref 98–107)
CHOLEST SERPL-MCNC: 193 MG/DL
CREAT SERPL-MCNC: 0.81 MG/DL (ref 0.67–1.17)
EGFRCR SERPLBLD CKD-EPI 2021: >90 ML/MIN/1.73M2
ERYTHROCYTE [DISTWIDTH] IN BLOOD BY AUTOMATED COUNT: 13.6 % (ref 10–15)
FASTING STATUS PATIENT QL REPORTED: YES
FASTING STATUS PATIENT QL REPORTED: YES
GLUCOSE SERPL-MCNC: 94 MG/DL (ref 70–99)
HCO3 SERPL-SCNC: 23 MMOL/L (ref 22–29)
HCT VFR BLD AUTO: 33.2 % (ref 40–53)
HDLC SERPL-MCNC: 59 MG/DL
HGB BLD-MCNC: 11.3 G/DL (ref 13.3–17.7)
LDLC SERPL CALC-MCNC: 118 MG/DL
MCH RBC QN AUTO: 31.2 PG (ref 26.5–33)
MCHC RBC AUTO-ENTMCNC: 34 G/DL (ref 31.5–36.5)
MCV RBC AUTO: 92 FL (ref 78–100)
NONHDLC SERPL-MCNC: 134 MG/DL
PLATELET # BLD AUTO: 264 10E3/UL (ref 150–450)
POTASSIUM SERPL-SCNC: 4.4 MMOL/L (ref 3.4–5.3)
PROT SERPL-MCNC: 7.3 G/DL (ref 6.4–8.3)
PSA SERPL DL<=0.01 NG/ML-MCNC: 0.56 NG/ML (ref 0–6.5)
RBC # BLD AUTO: 3.62 10E6/UL (ref 4.4–5.9)
SODIUM SERPL-SCNC: 138 MMOL/L (ref 135–145)
TRIGL SERPL-MCNC: 79 MG/DL
WBC # BLD AUTO: 6.1 10E3/UL (ref 4–11)

## 2025-08-11 PROCEDURE — 80053 COMPREHEN METABOLIC PANEL: CPT | Performed by: FAMILY MEDICINE

## 2025-08-11 PROCEDURE — G0103 PSA SCREENING: HCPCS | Performed by: FAMILY MEDICINE

## 2025-08-11 PROCEDURE — 3079F DIAST BP 80-89 MM HG: CPT | Performed by: FAMILY MEDICINE

## 2025-08-11 PROCEDURE — 85027 COMPLETE CBC AUTOMATED: CPT | Mod: GZ | Performed by: FAMILY MEDICINE

## 2025-08-11 PROCEDURE — 3075F SYST BP GE 130 - 139MM HG: CPT | Performed by: FAMILY MEDICINE

## 2025-08-11 PROCEDURE — 36415 COLL VENOUS BLD VENIPUNCTURE: CPT | Performed by: FAMILY MEDICINE

## 2025-08-11 PROCEDURE — G0439 PPPS, SUBSEQ VISIT: HCPCS | Performed by: FAMILY MEDICINE

## 2025-08-11 PROCEDURE — 80061 LIPID PANEL: CPT | Performed by: FAMILY MEDICINE

## 2025-08-11 PROCEDURE — 1126F AMNT PAIN NOTED NONE PRSNT: CPT | Performed by: FAMILY MEDICINE

## 2025-08-11 ASSESSMENT — PAIN SCALES - GENERAL: PAINLEVEL_OUTOF10: NO PAIN (0)

## (undated) RX ORDER — FENTANYL CITRATE 50 UG/ML
INJECTION, SOLUTION INTRAMUSCULAR; INTRAVENOUS
Status: DISPENSED
Start: 2017-08-01